# Patient Record
Sex: FEMALE | Race: WHITE | Employment: OTHER | URBAN - METROPOLITAN AREA
[De-identification: names, ages, dates, MRNs, and addresses within clinical notes are randomized per-mention and may not be internally consistent; named-entity substitution may affect disease eponyms.]

---

## 2017-01-12 ENCOUNTER — OFFICE VISIT - HEALTHEAST (OUTPATIENT)
Dept: FAMILY MEDICINE | Facility: CLINIC | Age: 82
End: 2017-01-12

## 2017-01-12 DIAGNOSIS — G31.84 MILD COGNITIVE IMPAIRMENT: ICD-10-CM

## 2017-12-20 ENCOUNTER — RECORDS - HEALTHEAST (OUTPATIENT)
Dept: LAB | Facility: CLINIC | Age: 82
End: 2017-12-20

## 2017-12-20 LAB
CHOLEST SERPL-MCNC: 190 MG/DL
FASTING STATUS PATIENT QL REPORTED: ABNORMAL
HDLC SERPL-MCNC: 49 MG/DL
LDLC SERPL CALC-MCNC: 127 MG/DL
TRIGL SERPL-MCNC: 70 MG/DL

## 2018-06-20 ENCOUNTER — RECORDS - HEALTHEAST (OUTPATIENT)
Dept: LAB | Facility: CLINIC | Age: 83
End: 2018-06-20

## 2018-06-20 LAB
ALBUMIN SERPL-MCNC: 2.8 G/DL (ref 3.5–5)
ALP SERPL-CCNC: 66 U/L (ref 45–120)
ALT SERPL W P-5'-P-CCNC: 11 U/L (ref 0–45)
ANION GAP SERPL CALCULATED.3IONS-SCNC: 8 MMOL/L (ref 5–18)
AST SERPL W P-5'-P-CCNC: 17 U/L (ref 0–40)
BILIRUB SERPL-MCNC: 0.6 MG/DL (ref 0–1)
BUN SERPL-MCNC: 13 MG/DL (ref 8–28)
CALCIUM SERPL-MCNC: 9.4 MG/DL (ref 8.5–10.5)
CHLORIDE BLD-SCNC: 105 MMOL/L (ref 98–107)
CO2 SERPL-SCNC: 26 MMOL/L (ref 22–31)
CREAT SERPL-MCNC: 0.71 MG/DL (ref 0.6–1.1)
ERYTHROCYTE [DISTWIDTH] IN BLOOD BY AUTOMATED COUNT: 13.4 % (ref 11–14.5)
GFR SERPL CREATININE-BSD FRML MDRD: >60 ML/MIN/1.73M2
GLUCOSE BLD-MCNC: 83 MG/DL (ref 70–125)
HCT VFR BLD AUTO: 43.6 % (ref 35–47)
HGB BLD-MCNC: 14.3 G/DL (ref 12–16)
MCH RBC QN AUTO: 30.4 PG (ref 27–34)
MCHC RBC AUTO-ENTMCNC: 32.8 G/DL (ref 32–36)
MCV RBC AUTO: 93 FL (ref 80–100)
PLATELET # BLD AUTO: 197 THOU/UL (ref 140–440)
PMV BLD AUTO: 9 FL (ref 8.5–12.5)
POTASSIUM BLD-SCNC: 3.6 MMOL/L (ref 3.5–5)
PROT SERPL-MCNC: 5.9 G/DL (ref 6–8)
RBC # BLD AUTO: 4.7 MILL/UL (ref 3.8–5.4)
SODIUM SERPL-SCNC: 139 MMOL/L (ref 136–145)
WBC: 7.7 THOU/UL (ref 4–11)

## 2018-08-11 ENCOUNTER — HOSPITAL ENCOUNTER (INPATIENT)
Facility: CLINIC | Age: 83
LOS: 2 days | Discharge: HOME | DRG: 690 | End: 2018-08-13
Attending: EMERGENCY MEDICINE | Admitting: INTERNAL MEDICINE
Payer: MEDICARE

## 2018-08-11 DIAGNOSIS — R00.1 SINUS BRADYCARDIA: ICD-10-CM

## 2018-08-11 DIAGNOSIS — N39.0 URINARY TRACT INFECTION WITHOUT HEMATURIA, SITE UNSPECIFIED: ICD-10-CM

## 2018-08-11 DIAGNOSIS — R53.1 WEAKNESS GENERALIZED: ICD-10-CM

## 2018-08-11 LAB
ALBUMIN SERPL-MCNC: 3 G/DL (ref 3.4–5)
ALBUMIN UR-MCNC: NEGATIVE MG/DL
ALP SERPL-CCNC: 88 U/L (ref 40–150)
ALT SERPL W P-5'-P-CCNC: 40 U/L (ref 0–50)
ANION GAP SERPL CALCULATED.3IONS-SCNC: 5 MMOL/L (ref 3–14)
APPEARANCE UR: ABNORMAL
AST SERPL W P-5'-P-CCNC: 80 U/L (ref 0–45)
BACTERIA #/AREA URNS HPF: ABNORMAL /HPF
BASOPHILS # BLD AUTO: 0.1 10E9/L (ref 0–0.2)
BASOPHILS NFR BLD AUTO: 0.6 %
BILIRUB DIRECT SERPL-MCNC: 0.1 MG/DL (ref 0–0.2)
BILIRUB SERPL-MCNC: 0.8 MG/DL (ref 0.2–1.3)
BILIRUB UR QL STRIP: NEGATIVE
BUN SERPL-MCNC: 18 MG/DL (ref 7–30)
CALCIUM SERPL-MCNC: 9.2 MG/DL (ref 8.5–10.1)
CHLORIDE SERPL-SCNC: 102 MMOL/L (ref 94–109)
CO2 BLDCOV-SCNC: 31 MMOL/L (ref 21–28)
CO2 SERPL-SCNC: 30 MMOL/L (ref 20–32)
COLOR UR AUTO: YELLOW
CREAT SERPL-MCNC: 0.73 MG/DL (ref 0.52–1.04)
DIFFERENTIAL METHOD BLD: ABNORMAL
EOSINOPHIL # BLD AUTO: 0.2 10E9/L (ref 0–0.7)
EOSINOPHIL NFR BLD AUTO: 2.8 %
ERYTHROCYTE [DISTWIDTH] IN BLOOD BY AUTOMATED COUNT: 13.5 % (ref 10–15)
GFR SERPL CREATININE-BSD FRML MDRD: 76 ML/MIN/1.7M2
GLUCOSE SERPL-MCNC: 100 MG/DL (ref 70–99)
GLUCOSE UR STRIP-MCNC: NEGATIVE MG/DL
HCT VFR BLD AUTO: 47.8 % (ref 35–47)
HGB BLD-MCNC: 15 G/DL (ref 11.7–15.7)
HGB UR QL STRIP: NEGATIVE
IMM GRANULOCYTES # BLD: 0.1 10E9/L (ref 0–0.4)
IMM GRANULOCYTES NFR BLD: 0.6 %
KETONES UR STRIP-MCNC: NEGATIVE MG/DL
LACTATE BLD-SCNC: 0.9 MMOL/L (ref 0.7–2.1)
LEUKOCYTE ESTERASE UR QL STRIP: ABNORMAL
LYMPHOCYTES # BLD AUTO: 1 10E9/L (ref 0.8–5.3)
LYMPHOCYTES NFR BLD AUTO: 11.8 %
MAGNESIUM SERPL-MCNC: 1.7 MG/DL (ref 1.6–2.3)
MCH RBC QN AUTO: 30 PG (ref 26.5–33)
MCHC RBC AUTO-ENTMCNC: 31.4 G/DL (ref 31.5–36.5)
MCV RBC AUTO: 96 FL (ref 78–100)
MONOCYTES # BLD AUTO: 0.7 10E9/L (ref 0–1.3)
MONOCYTES NFR BLD AUTO: 8.5 %
NEUTROPHILS # BLD AUTO: 6.4 10E9/L (ref 1.6–8.3)
NEUTROPHILS NFR BLD AUTO: 75.7 %
NITRATE UR QL: POSITIVE
NRBC # BLD AUTO: 0 10*3/UL
NRBC BLD AUTO-RTO: 0 /100
PCO2 BLDV: 54 MM HG (ref 40–50)
PH BLDV: 7.36 PH (ref 7.32–7.43)
PH UR STRIP: 5 PH (ref 5–7)
PLATELET # BLD AUTO: 264 10E9/L (ref 150–450)
PO2 BLDV: 22 MM HG (ref 25–47)
POTASSIUM SERPL-SCNC: 4.5 MMOL/L (ref 3.4–5.3)
POTASSIUM SERPL-SCNC: 5.2 MMOL/L (ref 3.4–5.3)
PROT SERPL-MCNC: 7.2 G/DL (ref 6.8–8.8)
RBC # BLD AUTO: 5 10E12/L (ref 3.8–5.2)
RBC #/AREA URNS AUTO: 1 /HPF (ref 0–2)
SAO2 % BLDV FROM PO2: 35 %
SODIUM SERPL-SCNC: 137 MMOL/L (ref 133–144)
SOURCE: ABNORMAL
SP GR UR STRIP: 1.01 (ref 1–1.03)
TROPONIN I BLD-MCNC: 0 UG/L (ref 0–0.1)
UROBILINOGEN UR STRIP-MCNC: 0 MG/DL (ref 0–2)
WBC # BLD AUTO: 8.5 10E9/L (ref 4–11)
WBC #/AREA URNS AUTO: 84 /HPF (ref 0–5)

## 2018-08-11 PROCEDURE — 12000007 ZZH R&B INTERMEDIATE

## 2018-08-11 PROCEDURE — 80076 HEPATIC FUNCTION PANEL: CPT | Performed by: EMERGENCY MEDICINE

## 2018-08-11 PROCEDURE — 87186 SC STD MICRODIL/AGAR DIL: CPT | Performed by: EMERGENCY MEDICINE

## 2018-08-11 PROCEDURE — 83605 ASSAY OF LACTIC ACID: CPT

## 2018-08-11 PROCEDURE — 25000128 H RX IP 250 OP 636: Performed by: EMERGENCY MEDICINE

## 2018-08-11 PROCEDURE — 96375 TX/PRO/DX INJ NEW DRUG ADDON: CPT

## 2018-08-11 PROCEDURE — 87088 URINE BACTERIA CULTURE: CPT | Performed by: EMERGENCY MEDICINE

## 2018-08-11 PROCEDURE — 80048 BASIC METABOLIC PNL TOTAL CA: CPT | Performed by: EMERGENCY MEDICINE

## 2018-08-11 PROCEDURE — 99285 EMERGENCY DEPT VISIT HI MDM: CPT | Mod: 25

## 2018-08-11 PROCEDURE — 96365 THER/PROPH/DIAG IV INF INIT: CPT

## 2018-08-11 PROCEDURE — 99223 1ST HOSP IP/OBS HIGH 75: CPT | Mod: AI | Performed by: INTERNAL MEDICINE

## 2018-08-11 PROCEDURE — 85025 COMPLETE CBC W/AUTO DIFF WBC: CPT | Performed by: EMERGENCY MEDICINE

## 2018-08-11 PROCEDURE — 84484 ASSAY OF TROPONIN QUANT: CPT

## 2018-08-11 PROCEDURE — 81001 URINALYSIS AUTO W/SCOPE: CPT | Performed by: EMERGENCY MEDICINE

## 2018-08-11 PROCEDURE — 87086 URINE CULTURE/COLONY COUNT: CPT | Performed by: EMERGENCY MEDICINE

## 2018-08-11 PROCEDURE — 82803 BLOOD GASES ANY COMBINATION: CPT

## 2018-08-11 PROCEDURE — 25000132 ZZH RX MED GY IP 250 OP 250 PS 637: Mod: GY | Performed by: INTERNAL MEDICINE

## 2018-08-11 PROCEDURE — 25000125 ZZHC RX 250: Performed by: INTERNAL MEDICINE

## 2018-08-11 PROCEDURE — 96361 HYDRATE IV INFUSION ADD-ON: CPT

## 2018-08-11 PROCEDURE — 93005 ELECTROCARDIOGRAM TRACING: CPT

## 2018-08-11 PROCEDURE — 83735 ASSAY OF MAGNESIUM: CPT | Performed by: EMERGENCY MEDICINE

## 2018-08-11 PROCEDURE — 84132 ASSAY OF SERUM POTASSIUM: CPT | Performed by: EMERGENCY MEDICINE

## 2018-08-11 PROCEDURE — 36415 COLL VENOUS BLD VENIPUNCTURE: CPT | Performed by: EMERGENCY MEDICINE

## 2018-08-11 PROCEDURE — 25000128 H RX IP 250 OP 636: Performed by: INTERNAL MEDICINE

## 2018-08-11 RX ORDER — DORZOLAMIDE HYDROCHLORIDE AND TIMOLOL MALEATE 20; 5 MG/ML; MG/ML
1 SOLUTION/ DROPS OPHTHALMIC EVERY MORNING
Status: DISCONTINUED | OUTPATIENT
Start: 2018-08-12 | End: 2018-08-13 | Stop reason: HOSPADM

## 2018-08-11 RX ORDER — NALOXONE HYDROCHLORIDE 0.4 MG/ML
.1-.4 INJECTION, SOLUTION INTRAMUSCULAR; INTRAVENOUS; SUBCUTANEOUS
Status: DISCONTINUED | OUTPATIENT
Start: 2018-08-11 | End: 2018-08-13 | Stop reason: HOSPADM

## 2018-08-11 RX ORDER — ONDANSETRON 2 MG/ML
2 INJECTION INTRAMUSCULAR; INTRAVENOUS ONCE
Status: COMPLETED | OUTPATIENT
Start: 2018-08-11 | End: 2018-08-11

## 2018-08-11 RX ORDER — ONDANSETRON 2 MG/ML
4 INJECTION INTRAMUSCULAR; INTRAVENOUS EVERY 6 HOURS PRN
Status: DISCONTINUED | OUTPATIENT
Start: 2018-08-11 | End: 2018-08-13 | Stop reason: HOSPADM

## 2018-08-11 RX ORDER — POTASSIUM CHLORIDE 750 MG/1
10 TABLET, EXTENDED RELEASE ORAL DAILY
Status: DISCONTINUED | OUTPATIENT
Start: 2018-08-12 | End: 2018-08-13 | Stop reason: HOSPADM

## 2018-08-11 RX ORDER — CEFTRIAXONE 1 G/1
1 INJECTION, POWDER, FOR SOLUTION INTRAMUSCULAR; INTRAVENOUS EVERY 24 HOURS
Status: DISCONTINUED | OUTPATIENT
Start: 2018-08-12 | End: 2018-08-13 | Stop reason: HOSPADM

## 2018-08-11 RX ORDER — BRIMONIDINE TARTRATE 2 MG/ML
1 SOLUTION/ DROPS OPHTHALMIC 3 TIMES DAILY
Status: DISCONTINUED | OUTPATIENT
Start: 2018-08-11 | End: 2018-08-13 | Stop reason: HOSPADM

## 2018-08-11 RX ORDER — ACETAMINOPHEN 500 MG
1000 TABLET ORAL 3 TIMES DAILY PRN
Status: ON HOLD | COMMUNITY
End: 2021-02-14

## 2018-08-11 RX ORDER — ONDANSETRON 4 MG/1
4 TABLET, ORALLY DISINTEGRATING ORAL EVERY 6 HOURS PRN
Status: DISCONTINUED | OUTPATIENT
Start: 2018-08-11 | End: 2018-08-13 | Stop reason: HOSPADM

## 2018-08-11 RX ORDER — POTASSIUM CHLORIDE 1500 MG/1
20-40 TABLET, EXTENDED RELEASE ORAL
Status: DISCONTINUED | OUTPATIENT
Start: 2018-08-11 | End: 2018-08-13 | Stop reason: HOSPADM

## 2018-08-11 RX ORDER — TRIAMTERENE/HYDROCHLOROTHIAZID 37.5-25 MG
1 TABLET ORAL DAILY
Status: DISCONTINUED | OUTPATIENT
Start: 2018-08-12 | End: 2018-08-13 | Stop reason: HOSPADM

## 2018-08-11 RX ORDER — TRIAMTERENE/HYDROCHLOROTHIAZID 37.5-25 MG
1 TABLET ORAL DAILY
COMMUNITY
End: 2018-08-11

## 2018-08-11 RX ORDER — POTASSIUM CHLORIDE 29.8 MG/ML
20 INJECTION INTRAVENOUS
Status: DISCONTINUED | OUTPATIENT
Start: 2018-08-11 | End: 2018-08-13 | Stop reason: HOSPADM

## 2018-08-11 RX ORDER — MULTIPLE VITAMINS W/ MINERALS TAB 9MG-400MCG
1 TAB ORAL DAILY
Status: DISCONTINUED | OUTPATIENT
Start: 2018-08-12 | End: 2018-08-13 | Stop reason: HOSPADM

## 2018-08-11 RX ORDER — ACETAMINOPHEN 500 MG
1000 TABLET ORAL 3 TIMES DAILY PRN
Status: DISCONTINUED | OUTPATIENT
Start: 2018-08-11 | End: 2018-08-13 | Stop reason: HOSPADM

## 2018-08-11 RX ORDER — POTASSIUM CHLORIDE 7.45 MG/ML
10 INJECTION INTRAVENOUS
Status: DISCONTINUED | OUTPATIENT
Start: 2018-08-11 | End: 2018-08-13 | Stop reason: HOSPADM

## 2018-08-11 RX ORDER — SODIUM CHLORIDE 9 MG/ML
INJECTION, SOLUTION INTRAVENOUS CONTINUOUS
Status: DISCONTINUED | OUTPATIENT
Start: 2018-08-11 | End: 2018-08-12

## 2018-08-11 RX ORDER — HYPOCHLOROUS ACID/SODIUM CHLOR 0.01 %
SPRAY, NON-AEROSOL (ML) TOPICAL DAILY
Status: DISCONTINUED | OUTPATIENT
Start: 2018-08-12 | End: 2018-08-13 | Stop reason: HOSPADM

## 2018-08-11 RX ORDER — POTASSIUM CHLORIDE 1.5 G/1.58G
20-40 POWDER, FOR SOLUTION ORAL
Status: DISCONTINUED | OUTPATIENT
Start: 2018-08-11 | End: 2018-08-13 | Stop reason: HOSPADM

## 2018-08-11 RX ORDER — CALCIUM CARBONATE 750 MG/1
1500 TABLET, CHEWABLE ORAL DAILY PRN
COMMUNITY

## 2018-08-11 RX ORDER — LATANOPROST 50 UG/ML
1 SOLUTION/ DROPS OPHTHALMIC AT BEDTIME
Status: DISCONTINUED | OUTPATIENT
Start: 2018-08-11 | End: 2018-08-13 | Stop reason: HOSPADM

## 2018-08-11 RX ORDER — POTASSIUM CL/LIDO/0.9 % NACL 10MEQ/0.1L
10 INTRAVENOUS SOLUTION, PIGGYBACK (ML) INTRAVENOUS
Status: DISCONTINUED | OUTPATIENT
Start: 2018-08-11 | End: 2018-08-13 | Stop reason: HOSPADM

## 2018-08-11 RX ORDER — POTASSIUM CHLORIDE 750 MG/1
10 TABLET, EXTENDED RELEASE ORAL DAILY
Status: ON HOLD | COMMUNITY
End: 2021-02-14

## 2018-08-11 RX ORDER — CYCLOSPORINE 0.5 MG/ML
1 EMULSION OPHTHALMIC 2 TIMES DAILY
Status: ON HOLD | COMMUNITY
End: 2021-02-14

## 2018-08-11 RX ORDER — CEFTRIAXONE 1 G/1
1 INJECTION, POWDER, FOR SOLUTION INTRAMUSCULAR; INTRAVENOUS ONCE
Status: COMPLETED | OUTPATIENT
Start: 2018-08-11 | End: 2018-08-11

## 2018-08-11 RX ORDER — CHLORAL HYDRATE 500 MG
2 CAPSULE ORAL DAILY
Status: ON HOLD | COMMUNITY
End: 2021-02-14

## 2018-08-11 RX ORDER — BRIMONIDINE TARTRATE 1.5 MG/ML
1 SOLUTION/ DROPS OPHTHALMIC 3 TIMES DAILY
Status: ON HOLD | COMMUNITY
End: 2021-02-14

## 2018-08-11 RX ADMIN — CEFTRIAXONE SODIUM 1 G: 1 INJECTION, POWDER, FOR SOLUTION INTRAMUSCULAR; INTRAVENOUS at 15:38

## 2018-08-11 RX ADMIN — DEXTRAN 70 AND HYPROMELLOSE 2910 1 DROP: 1; 3 SOLUTION/ DROPS OPHTHALMIC at 21:11

## 2018-08-11 RX ADMIN — SODIUM CHLORIDE: 9 INJECTION, SOLUTION INTRAVENOUS at 17:42

## 2018-08-11 RX ADMIN — LATANOPROST 1 DROP: 50 SOLUTION/ DROPS OPHTHALMIC at 21:16

## 2018-08-11 RX ADMIN — ONDANSETRON 4 MG: 2 INJECTION, SOLUTION INTRAMUSCULAR; INTRAVENOUS at 20:13

## 2018-08-11 RX ADMIN — BRIMONIDINE TARTRATE 1 DROP: 2 SOLUTION/ DROPS OPHTHALMIC at 21:07

## 2018-08-11 RX ADMIN — SODIUM CHLORIDE 500 ML: 9 INJECTION, SOLUTION INTRAVENOUS at 14:47

## 2018-08-11 RX ADMIN — ONDANSETRON 2 MG: 2 INJECTION INTRAMUSCULAR; INTRAVENOUS at 15:39

## 2018-08-11 ASSESSMENT — ENCOUNTER SYMPTOMS
NAUSEA: 1
DYSURIA: 0
DIARRHEA: 0
ACTIVITY CHANGE: 1
FATIGUE: 1
WEAKNESS: 1
APPETITE CHANGE: 1
LIGHT-HEADEDNESS: 1
FREQUENCY: 0
VOMITING: 1

## 2018-08-11 ASSESSMENT — ACTIVITIES OF DAILY LIVING (ADL)
RETIRED_COMMUNICATION: 0-->UNDERSTANDS/COMMUNICATES WITHOUT DIFFICULTY
RETIRED_EATING: 0-->INDEPENDENT
BATHING: 1-->ASSISTIVE EQUIPMENT
COGNITION: 1 - ATTENTION OR MEMORY DEFICITS
SWALLOWING: 0-->SWALLOWS FOODS/LIQUIDS WITHOUT DIFFICULTY
FALL_HISTORY_WITHIN_LAST_SIX_MONTHS: YES
TRANSFERRING: 3-->ASSISTIVE EQUIPMENT AND PERSON
AMBULATION: 3-->ASSISTIVE EQUIPMENT AND PERSON
ADLS_ACUITY_SCORE: 20
NUMBER_OF_TIMES_PATIENT_HAS_FALLEN_WITHIN_LAST_SIX_MONTHS: 2
WHICH_OF_THE_ABOVE_FUNCTIONAL_RISKS_HAD_A_RECENT_ONSET_OR_CHANGE?: AMBULATION
DRESS: 0-->INDEPENDENT
TOILETING: 2-->ASSISTIVE PERSON

## 2018-08-11 NOTE — IP AVS SNAPSHOT
` `     [place] [address] MEDICAL SURGICAL: [phone]            Medication Administration Report for Topitzhofer, Jean M as of 08/13/18 1015   Legend:    Given Hold Not Given Due Canceled Entry Other Actions    Time Time (Time) Time  Time-Action       Inactive    Active    Linked        Medications 08/07/18 08/08/18 08/09/18 08/10/18 08/11/18 08/12/18 08/13/18    acetaminophen (TYLENOL) tablet 1,000 mg  Dose: 1,000 mg  Freq: 3 TIMES DAILY PRN Route: PO  PRN Reason: mild pain  Start: 08/11/18 1711   Admin Instructions: Maximum acetaminophen dose from all sources = 75 mg/kg/day not to exceed 4 gram    Admin. Amount: 2 tablet (2 × 500 mg tablet)  Dispense Loc:  [facility]               AVENOVA 0.01 % SOLN  Freq: DAILY Route: EX  Start: 08/12/18 0900   Admin Instructions: PATIENT MAY USE OWN SUPPLY AFTER PHARMACY VERIFICATION    Last Admin: 08/13/18 1010  Dispense Loc:  Main Pharmacy          (0924)-Not Given        1010 ( )-Given           brimonidine (ALPHAGAN) 0.2 % ophthalmic solution 1 drop  Dose: 1 drop  Freq: 3 TIMES DAILY Route: RIGHT EYE  Start: 08/11/18 1715   Admin Instructions: Formulary alternate for BRIMONIDINE 0.15%<br>    Admin. Amount: 1 drop  Last Admin: 08/13/18 1009  Dispense Loc:  Main Pharmacy  Volume: 5 mL         (1843)-Not Given       2107 (1 drop)-Given        0914 (1 drop)-Given       1527 (1 drop)-Given       2127 (1 drop)-Given        1009 (1 drop)-Given       [ ] 1600       [ ] 2200           cefTRIAXone (ROCEPHIN) 1 g vial to attach to  mL bag for ADULTS or NS 50 mL bag for PEDS  Dose: 1 g  Freq: EVERY 24 HOURS Route: IV  Indications of Use: URINARY TRACT INFECTION  Start: 08/12/18 1530   Admin Instructions: FIRST DOSE IN ER    Admin. Amount: 1 g  Last Admin: 08/12/18 1423  Dispense Loc: [facility]  Infused Over: 15-30 Minutes  Volume: 10 mL          1423 (1 g)-New Bag               [ ] 1530           CERAVE CREA  Freq: DAILY Route: EX  Start: 08/12/18 0900   Admin  Instructions: PATIENT MAY USE OWN SUPPLY AFTER PHARMACY VERIFICATION    Dispense Loc:  Main Pharmacy          (0923)-Not Given        (0923)-Not Given           dorzolamide-timolol (COSOPT) ophthalmic solution 1 drop  Dose: 1 drop  Freq: EVERY MORNING Route: RIGHT EYE  Start: 08/12/18 0900   Admin. Amount: 1 drop  Last Admin: 08/13/18 0928  Dispense Loc:  Main Pharmacy  Volume: 10 mL          0914 (1 drop)-Given        0928 (1 drop)-Given           hypromellose-dextran (ARTIFICAL TEARS) 0.1-0.3 % ophthalmic solution 1 drop  Dose: 1 drop  Freq: 2 TIMES DAILY Route: Both Eyes  Start: 08/11/18 2100   Admin Instructions: Formulary alternate for<br>RESTASIS    Admin. Amount: 1 drop  Last Admin: 08/13/18 0921  Dispense Loc:  Main Pharmacy  Volume: 15 mL         2111 (1 drop)-Given        0914 (1 drop)-Given       2134 (1 drop)-Given        0921 (1 drop)-Given       [ ] 2100           latanoprost (XALATAN) 0.005 % ophthalmic solution 1 drop  Dose: 1 drop  Freq: AT BEDTIME Route: Both Eyes  Start: 08/11/18 2200   Admin Instructions: Refrigerated product.    Admin. Amount: 1 drop  Last Admin: 08/12/18 2120  Dispense Loc:  Main Pharmacy  Volume: 2.5 mL         2116 (1 drop)-Given        2120 (1 drop)-Given        [ ] 2200           melatonin tablet 1 mg  Dose: 1 mg  Freq: AT BEDTIME PRN Route: PO  PRN Reason: sleep  Start: 08/11/18 1711   Admin Instructions: Do not give unless at least 6 hours of uninterrupted sleep is expected.    Admin. Amount: 1 tablet (1 × 1 mg tablet)  Dispense Loc: [facility]               multivitamin, therapeutic with minerals (THERA-VIT-M) tablet 1 tablet  Dose: 1 tablet  Freq: DAILY Route: PO  Start: 08/12/18 0900   Admin Instructions: Formulary alternate for<br>CENTRUM    Admin. Amount: 1 tablet  Last Admin: 08/13/18 0926  Dispense Loc: [facility]          0914 (1 tablet)-Given        0926 (1 tablet)-Given           naloxone (NARCAN) injection 0.1-0.4 mg  Dose: 0.1-0.4 mg  Freq: EVERY 2  MIN PRN Route: IV  PRN Reason: opioid reversal  Start: 08/11/18 1711   Admin Instructions: For respiratory rate LESS than or EQUAL to 8.  Partial reversal dose:  0.1 mg titrated q 2 minutes for Analgesia Side Effects Monitoring Sedation Level of 3 (frequently drowsy, arousable, drifts to sleep during conversation).Full reversal dose:  0.4 mg bolus for Analgesia Side Effects Monitoring Sedation Level of 4 (somnolent, minimal or no response to stimulation).  For ordered doses up to 2mg give IVP. Give each 0.4mg over 15 seconds in emergency situations. For non-emergent situations further dilute in 9mL of NS to facilitate titration of response.    Admin. Amount: 0.1-0.4 mg = 0.25-1 mL Conc: 0.4 mg/mL  Dispense Loc:  [facility]  Volume: 1 mL               ondansetron (ZOFRAN-ODT) ODT tab 4 mg  Dose: 4 mg  Freq: EVERY 6 HOURS PRN Route: PO  PRN Reasons: nausea,vomiting  Start: 08/11/18 1711   Admin Instructions: This is Step 1 of nausea and vomiting management.  If nausea not resolved in 15 minutes, go to Step 2 prochlorperazine (COMPAZINE). Do not push through foil backing. Peel back foil and gently remove. Place on tongue immediately. Administration with liquid unnecessary  With dry hands, peel back foil backing and gently remove tablet; do not push oral disintegrating tablet through foil backing; administer immediately on tongue and oral disintegrating tablet dissolves in seconds; then swallow with saliva; liquid not required.    Admin. Amount: 1 tablet (1 × 4 mg tablet)  Dispense Loc: [facility]                     Or  ondansetron (ZOFRAN) injection 4 mg  Dose: 4 mg  Freq: EVERY 6 HOURS PRN Route: IV  PRN Reasons: nausea,vomiting  Start: 08/11/18 1711   Admin Instructions: This is Step 1 of nausea and vomiting management.  If nausea not resolved in 15 minutes, go to Step 2 prochlorperazine (COMPAZINE).  Irritant. For ordered doses up to 4 mg, give IV Push undiluted over 2-5 minutes.    Admin. Amount: 4 mg = 2 mL  Conc: 4 mg/2 mL  Last Admin: 08/11/18 2013  Dispense Loc:  [facility]  Infused Over: 2-5 Minutes  Volume: 2 mL         2013 (4 mg)-Given             potassium chloride (K-TAB,KLOR-CON) CR tablet 10 mEq  Dose: 10 mEq  Freq: DAILY Route: PO  Start: 08/12/18 0900   Admin Instructions: DO NOT CRUSH.    Admin. Amount: 1 tablet (1 × 10 mEq tablet)  Last Admin: 08/13/18 0926  Dispense Loc:  [facility]          0914 (10 mEq)-Given        0926 (10 mEq)-Given           potassium chloride (KLOR-CON) Packet 20-40 mEq  Dose: 20-40 mEq  Freq: EVERY 2 HOURS PRN Route: ORAL OR FEED  PRN Reason: potassium supplementation  Start: 08/11/18 1711   Admin Instructions: Use if unable to tolerate tablets.  If Serum K+ 3.0-3.3, dose = 60 mEq po total dose (40 mEq x1 followed in 2 hours by 20 mEq x1). Recheck K+ level 4 hours after dose and the next AM.  If Serum K+ 2.5-2.9, dose = 80 mEq po total dose (40 mEq Q2H x2). Recheck K+ level 4 hours after dose and the next AM.  If Serum K+ less than 2.5, See IV order.  Dissolve packet contents in 4-8 ounces of cold water or juice.    Admin. Amount: 20-40 mEq  Dispense Loc:  [facility]               potassium chloride 10 mEq in 100 mL intermittent infusion with 10 mg lidocaine  Dose: 10 mEq  Freq: EVERY 1 HOUR PRN Route: IV  PRN Reason: potassium supplementation  Start: 08/11/18 1711   Admin Instructions: Infuse via PERIPHERAL LINE. Use potassium with lidocaine for pain with peripheral administration.  If Serum K+ 3.0-3.3, dose = 10 mEq/hr x4 doses (40 mEq IV total dose). Recheck K+ level 2 hours after dose and the next AM.  If Serum K+ less than 3.0, dose = 10 mEq/hr x6 doses (60 mEq IV total dose). Recheck K+ level 2 hours after dose and the next AM.    Admin. Amount: 10 mEq = 100 mL Conc: 10 mEq/100 mL  Dispense Loc:  Main Pharmacy  Infused Over: 1 Hours  Volume: 100 mL               potassium chloride 10 mEq in 100 mL sterile water intermittent infusion (premix)  Dose: 10 mEq  Freq: EVERY 1  HOUR PRN Route: IV  PRN Reason: potassium supplementation  Start: 08/11/18 1711   Admin Instructions: Infuse via PERIPHERAL LINE or CENTRAL LINE. Use for central line replacement if patient weight less than 65 kg, if patient is on TPN with high potassium content or if unit does not stock 20 mEq bags.   If Serum K+ 3.0-3.3, dose = 10 mEq/hr x4 doses (40 mEq IV total dose). Recheck K+ level 2 hours after dose and the next AM.   If Serum K+ less than 3.0, dose = 10 mEq/hr x6 doses (60 mEq IV total dose). Recheck K+ level 2 hours after dose and the next AM.    Admin. Amount: 10 mEq = 100 mL Conc: 10 mEq/100 mL  Dispense Loc:  Main Pharmacy  Infused Over: 60 Minutes  Volume: 100 mL               potassium chloride 20 mEq in 50 mL intermittent infusion  Dose: 20 mEq  Freq: EVERY 1 HOUR PRN Route: IV  PRN Reason: potassium supplementation  Start: 08/11/18 1711   Admin Instructions: GIVE OVER 2 HOURS DUE TO LOW PATIENT WEIGHT<br>Infuse via CENTRAL LINE Only. May need EKG if less than 65 kg or on TPN - Max rate is 0.3 mEq/kg/hr for patients not on EKG monitoring. <br>If Serum K+ 3.0-3.3, dose = 20 mEq/hr x2 doses (40 mEq IV total dose). Recheck K+ level 2 hours after dose and the next AM.<br>If Serum K+ less than 3.0, dose = 20 mEq/hr x3 doses (60 mEq IV total dose). Recheck K+ level 2 hours after dose and the next AM.    Admin. Amount: 20 mEq = 50 mL Conc: 20 mEq/50 mL  Dispense Loc:  Main Pharmacy  Infused Over: 2 Hours  Volume: 50 mL               potassium chloride SA (K-DUR/KLOR-CON M) CR tablet 20-40 mEq  Dose: 20-40 mEq  Freq: EVERY 2 HOURS PRN Route: PO  PRN Reason: potassium supplementation  Start: 08/11/18 1711   Admin Instructions: Use if able to take PO.   If Serum K+ 3.0-3.3, dose = 60 mEq po total dose (40 mEq x1 followed in 2 hours by 20 mEq x1). Recheck K+ level 4 hours after dose and the next AM.  If Serum K+ 2.5-2.9, dose = 80 mEq po total dose (40 mEq Q2H x2). Recheck K+ level 4 hours after dose and  the next AM.  If Serum K+ less than 2.5, See IV order.  DO NOT CRUSH    Admin. Amount: 1-2 tablet (1-2 × 20 mEq tablet)  Dispense Loc: [facility] MS3[facility]               triamterene-hydrochlorothiazide (MAXZIDE-25) 37.5-25 MG per tablet 1 tablet  Dose: 1 tablet  Freq: DAILY Route: PO  Start: 18 0900   Admin. Amount: 1 tablet  Last Admin: 18 09  Dispense Loc: [facility]          0914 (1 tablet)-Given        926 (1 tablet)-Given          Completed Medications  Medications 08/07/18 08/08/18 08/09/18 08/10/18 08/11/18 08/12/18 08/13/18         Dose: 500 mL  Freq: ONCE Route: IV  Last Dose: 500 mL (18 144)  Start: 18 141   End: 18 1547   Admin. Amount: 500 mL  Last Admin: 18 144  Dispense Loc: [facility] ER  Infused Over: 1 Hours  Administrations Remainin  Volume: 500 mL         1447 (500 mL)-New Bag               Dose: 1 g  Freq: ONCE Route: IV  Indications of Use: URINARY TRACT INFECTION  Start: 18 1521   End: 18 1608   Admin. Amount: 1 g  Last Admin: 18 1538  Dispense Loc: [facility] ADS ERA  Infused Over: 15-30 Minutes  Administrations Remainin  Volume: 10 mL         1538 (1 g)-New Bag               Dose: 2 mg  Freq: ONCE Route: IV  Start: 18 1518   End: 18 1541   Admin Instructions: Irritant. For ordered doses up to 4 mg, give IV Push undiluted over 2-5 minutes.    Admin. Amount: 2 mg = 1 mL Conc: 4 mg/2 mL  Last Admin: 18 153  Dispense Loc: [facility]S ERA  Infused Over: 2-5 Minutes  Administrations Remainin  Volume: 2 mL         1539 (2 mg)-Given            Discontinued Medications  Medications 08/07/18 08/08/18 08/09/18 08/10/18 08/11/18 08/12/18 08/13/18         Rate: 100 mL/hr   Freq: CONTINUOUS Route: IV  Last Dose: Stopped (18 0808)  Start: 18   End: 18   Last Admin: 18  Dispense Loc: [facility] Floor Stock  Volume: 1,000 mL          ( )-New Bag        ( )-Rate/Dose Verify        313 ( )-New Bag        0808-Stopped       1729-Med Discontinued

## 2018-08-11 NOTE — ED TRIAGE NOTES
Pt has not felt well for the past three days but today pt could not get up out of bed and stand due to weakness, vomiting and nausea. Pt is a fall risk and lives at an assisted living facilty. Pt does have dementia that is progressing rapidly per niece and guardian.

## 2018-08-11 NOTE — IP AVS SNAPSHOT
"    [place] [address] MEDICAL SURGICAL: [phone]                                              INTERAGENCY TRANSFER FORM - LAB / IMAGING / EKG / EMG RESULTS   2018                    Hospital Admission Date: 2018  JEAN M TOPITZHOFER   : 1934  Sex: Female        Attending Provider: (none)    Allergies:  No Known Allergies    Infection:  None   Service:  GENERAL MEDI    Ht:  1.575 m (5' 2\")   Wt:  49 kg (108 lb)   Admission Wt:  49 kg (108 lb)    BMI:  19.75 kg/m 2   BSA:  1.46 m 2            Patient PCP Information     Provider PCP Type    [facility]         Lab Results - 3 Days      Urine Culture Aerobic Bacterial [[id number]] (Abnormal)  Resulted: 18 0740, Result status: Final result    Ordering provider: Paolo Elizondo MD  18 1417 Resulting lab: [facility]    Specimen Information    Type Source Collected On   Catheterized Urine  18 1444          Components       Value Reference Range Flag Lab   Specimen Description Catheterized Urine      Special Requests Specimen received in preservative   75   Culture Micro --  A 225   Result:         >100,000 colonies/mL  Escherichia coli              Basic metabolic panel [[id number]] (Abnormal)  Resulted: 18 0750, Result status: Final result    Ordering provider: Joselo Llanes MD  18 0702 Resulting lab: [facility]    Specimen Information    Type Source Collected On     18 0717          Components       Value Reference Range Flag Lab   Sodium 141 133 - 144 mmol/L  FrRdHs   Potassium 3.7 3.4 - 5.3 mmol/L  FrRdHs   Chloride 107 94 - 109 mmol/L  FrRdHs   Carbon Dioxide 26 20 - 32 mmol/L  FrRdHs   Anion Gap 8 3 - 14 mmol/L  FrRdHs   Glucose 79 70 - 99 mg/dL  FrRdHs   Urea Nitrogen 16 7 - 30 mg/dL  FrRdHs   Creatinine 0.76 0.52 - 1.04 mg/dL  FrRdHs   GFR Estimate 72 >60 mL/min/1.7m2  FrRdHs   Comment:  Non  GFR Calc   GFR Estimate If " Black 87 >60 mL/min/1.7m2  FrRdHs   Comment:  African American GFR Calc   Calcium 8.2 8.5 - 10.1 mg/dL L [facility]            Basic metabolic panel [[id number]]  Resulted: 08/12/18 0701, Result status: Final result    Ordering provider: Joselo Llanes MD  08/12/18 0000 Resulting lab: [facility]    Specimen Information    Type Source Collected On   Blood  08/12/18 0623          Components       Value Reference Range Flag Lab   Sodium Canceled, Test credited 133 - 144 mmol/L  FrRdHs   Comment:         Unsatisfactory specimen - hemolyzed  Called to  [facility] (MS3) ON 081218 AT 0700 BY KV     Potassium Canceled, Test credited 3.4 - 5.3 mmol/L  FrRdHs   Comment:         Unsatisfactory specimen - hemolyzed  Called to  [facility] (MS3) ON 081218 AT 0700 BY KV     Chloride Canceled, Test credited 94 - 109 mmol/L  FrRdHs   Comment:         Unsatisfactory specimen - hemolyzed  Called to  [facility] (MS3) ON 081218 AT 0700 BY KV     Carbon Dioxide Canceled, Test credited 20 - 32 mmol/L  FrRdHs   Comment:         Unsatisfactory specimen - hemolyzed  Called to  [facility] (MS3) ON 081218 AT 0700 BY KV     Anion Gap Not Calculated 3 - 14 mmol/L  FrRdHs   Comment:         Unsatisfactory specimen - hemolyzed  Called to  [facility] (MS3) ON 081218 AT 0700 BY KV     Glucose Canceled, Test credited 70 - 99 mg/dL  FrRdHs   Comment:         Unsatisfactory specimen - hemolyzed  Called to  [facility] (MS3) ON 081218 AT 0700 BY KV     Urea Nitrogen Canceled, Test credited 7 - 30 mg/dL  FrRdHs   Comment:         Unsatisfactory specimen - hemolyzed  Called to  [facility] (MS3) ON 081218 AT 0700 BY KV     Creatinine Canceled, Test credited 0.52 - 1.04 mg/dL  FrRdHs   Comment:         Unsatisfactory specimen - hemolyzed  Called to  [facility] (MS3) ON 081218 AT 0700 BY KV     GFR Estimate Not Calculated >60 mL/min/1.7m2  FrRdHs   Comment:          Unsatisfactory specimen - hemolyzed  Called to  [facility] (MS3) ON 081218 AT 0700 BY KV     GFR Estimate If Black Not Calculated >60 mL/min/1.7m2  FrRd   Comment:         Unsatisfactory specimen - hemolyzed  Called to  [facility] (MS3) ON 081218 AT 0700 BY KV     Calcium Canceled, Test credited 8.5 - 10.1 mg/dL  FrRd   Comment:         Unsatisfactory specimen - hemolyzed  Called to  [facility] (MS3) ON 081218 AT 0700 BY KV              Potassium [[id number]]  Resulted: 08/11/18 1626, Result status: Final result    Ordering provider: Paolo Elizondo MD  08/11/18 1557 Resulting lab: [facility]    Specimen Information    Type Source Collected On     08/11/18 1611          Components       Value Reference Range Flag Lab   Potassium 4.5 3.4 - 5.3 mmol/L  Rd            Magnesium [[id number]]  Resulted: 08/11/18 1626, Result status: Final result    Ordering provider: Paolo Elizondo MD  08/11/18 1557 Resulting lab: [facility]    Specimen Information    Type Source Collected On     08/11/18 1611          Components       Value Reference Range Flag Lab   Magnesium 1.7 1.6 - 2.3 mg/dL  FrRd            Potassium [[id number]]  Resulted: 08/11/18 1534, Result status: In process    Ordering provider: Paolo Elizondo MD  08/11/18 1517 Resulting lab: [facility]    Specimen Information    Type Source Collected On   Blood  08/11/18 1534            Magnesium [[id number]]  Resulted: 08/11/18 1534, Result status: In process    Ordering provider: Paolo Elizondo MD  08/11/18 1533 Resulting lab: [facility]    Specimen Information    Type Source Collected On   Blood  08/11/18 1534            UA with Microscopic [[id number]] (Abnormal)  Resulted: 08/11/18 1513, Result status: Final result    Ordering provider: Paolo Elizondo MD  08/11/18 1417 Resulting lab: [facility]    Specimen Information    Type Source Collected On   Catheterized Urine   08/11/18 1445          Components       Value Reference Range Flag Lab   Color Urine Yellow   FrRdHs   Appearance Urine Slightly Cloudy   FrRdHs   Glucose Urine Negative NEG^Negative mg/dL  FrRdHs   Bilirubin Urine Negative NEG^Negative  FrRdHs   Ketones Urine Negative NEG^Negative mg/dL  FrRdHs   Specific Gravity Urine 1.015 1.003 - 1.035  FrRdHs   Blood Urine Negative NEG^Negative  FrRdHs   pH Urine 5.0 5.0 - 7.0 pH  FrRdHs   Protein Albumin Urine Negative NEG^Negative mg/dL  FrRdHs   Urobilinogen mg/dL 0.0 0.0 - 2.0 mg/dL  FrRdHs   Nitrite Urine Positive NEG^Negative A FrRdHs   Leukocyte Esterase Urine Moderate NEG^Negative A FrRdHs   Source Catheterized Urine   FrRdHs   WBC Urine 84 0 - 5 /HPF H FrRdHs   RBC Urine 1 0 - 2 /HPF  FrRdHs   Bacteria Urine Many NEG^Negative /HPF A FrRdHs            Basic metabolic panel (BMP) [[id number]] (Abnormal)  Resulted: 08/11/18 1512, Result status: Final result    Ordering provider: Paolo Elizondo MD  08/11/18 1416 Resulting lab: [facility]    Specimen Information    Type Source Collected On   Blood  08/11/18 1428          Components       Value Reference Range Flag Lab   Sodium 137 133 - 144 mmol/L  FrRdHs   Potassium 5.2 3.4 - 5.3 mmol/L  FrRdHs   Comment:  Specimen slightly hemolyzed, potassium may be falsely elevated   Chloride 102 94 - 109 mmol/L  FrRdHs   Carbon Dioxide 30 20 - 32 mmol/L  FrRdHs   Anion Gap 5 3 - 14 mmol/L  FrRdHs   Glucose 100 70 - 99 mg/dL H FrRdHs   Urea Nitrogen 18 7 - 30 mg/dL  FrRdHs   Creatinine 0.73 0.52 - 1.04 mg/dL  FrRdHs   GFR Estimate 76 >60 mL/min/1.7m2  FrRd   Comment:  Non  GFR Calc   GFR Estimate If Black >90 >60 mL/min/1.7m2  Rd   Comment:  African American GFR Calc   Calcium 9.2 8.5 - 10.1 mg/dL  FrRd            Hepatic panel [[id number]] (Abnormal)  Resulted: 08/11/18 1512, Result status: Final result    Ordering provider: Paolo Elizondo MD  08/11/18 1416 Resulting lab: [facility]    Specimen Information    Type Source Collected On   Blood  08/11/18 1428          Components       Value Reference Range Flag Lab   Bilirubin Direct 0.1 0.0 - 0.2 mg/dL  FrRdHs   Bilirubin Total 0.8 0.2 - 1.3 mg/dL  FrRdHs   Albumin 3.0 3.4 - 5.0 g/dL L FrRdHs   Protein Total 7.2 6.8 - 8.8 g/dL  FrRdHs   Alkaline Phosphatase 88 40 - 150 U/L  FrRdHs   ALT 40 0 - 50 U/L  FrRdHs   AST 80 0 - 45 U/L H FrRdHs            Lactic acid whole blood [[id number]]  Resulted: 08/11/18 1510, Result status: In process    Ordering provider: Paolo Elizondo MD  08/11/18 1505 Resulting lab: [facility]    Specimen Information    Type Source Collected On     08/11/18 1505            CBC + differential [[id number]] (Abnormal)  Resulted: 08/11/18 1453, Result status: Final result    Ordering provider: Paolo Elizondo MD  08/11/18 1416 Resulting lab: [facility]    Specimen Information    Type Source Collected On   Blood  08/11/18 1428          Components       Value Reference Range Flag Lab   WBC 8.5 4.0 - 11.0 10e9/L  FrRdHs   RBC Count 5.00 3.8 - 5.2 10e12/L  FrRdHs   Hemoglobin 15.0 11.7 - 15.7 g/dL  FrRdHs   Hematocrit 47.8 35.0 - 47.0 % H FrRdHs   MCV 96 78 - 100 fl  FrRdHs   MCH 30.0 26.5 - 33.0 pg  FrRdHs   MCHC 31.4 31.5 - 36.5 g/dL L FrRdHs   RDW 13.5 10.0 - 15.0 %  FrRdHs   Platelet Count 264 150 - 450 10e9/L  FrRdHs   Diff Method Automated Method   FrRdHs   % Neutrophils 75.7 %  FrRdHs   % Lymphocytes 11.8 %  FrRdHs   % Monocytes 8.5 %  FrRdHs   % Eosinophils 2.8 %  FrRdHs   % Basophils 0.6 %  FrRdHs   % Immature Granulocytes 0.6 %  FrRdHs   Nucleated RBCs 0 0 /100  FrRdHs   Absolute Neutrophil 6.4 1.6 - 8.3 10e9/L  FrRdHs   Absolute Lymphocytes 1.0 0.8 - 5.3 10e9/L  FrRdHs   Absolute Monocytes 0.7 0.0 - 1.3 10e9/L  FrRdHs   Absolute Eosinophils 0.2 0.0 - 0.7 10e9/L  FrRdHs   Absolute Basophils 0.1 0.0 - 0.2 10e9/L  FrRdHs   Abs Immature Granulocytes 0.1 0 - 0.4 10e9/L  FrRdHs   Absolute Nucleated RBC  "0.0   [facility]            Testing Performed By     Lab - Abbreviation Name Director Address Valid Date Range    12 - FrRdHs [facility] Unknown [address]  [facility] [address] 05/08/15 1057 - Present    45 - PDO152 [facility] Unknown Unknown 01/28/02 0000 - Present    75 - Unknown [facility] Unknown [address] [facility] [address] 01/15/15 1019 - Present    225 - Unknown [facility] Unknown [address] [facility] [address] 12/19/14 0954 - Present            Unresulted Labs (24h ago through future)    Start       Ordered    Unscheduled  Potassium  (Potassium Replacement - \"Standard\" - For K levels less than 3.4 mmol/L - UU,UR,UA,RH,SH,PH,WY )  CONDITIONAL (SPECIFY),   Routine     Comments:  Obtain Potassium Level for these conditions:  *IF no potassium result within 24 hours before initiation of order set, draw potassium level with next lab collect.    *2 HOURS AFTER last IV potassium replacement dose and 4 hours after an oral replacement dose.  *Next morning after potassium dose.     Repeat Potassium Replacement if necessary.    08/11/18 2231      Encounter-Level Documents:     There are no encounter-level documents.      Order-Level Documents:     There are no order-level documents.      "

## 2018-08-11 NOTE — IP AVS SNAPSHOT
MRN:[id number]                      After Visit Summary   8/11/2018    Jean M Topitzhofer    MRN: [id number]           Thank you!     Thank you for choosing [facility] for your care. Our goal is always to provide you with excellent care. Hearing back from our patients is one way we can continue to improve our services. Please take a few minutes to complete the written survey that you may receive in the mail after you visit. If you would like to speak to someone directly about your visit please contact Patient Relations at [phone]. Thank you!          Patient Information     Date Of Birth          7/4/1934        Designated Caregiver       Most Recent Value    Caregiver    Will someone help with your care after discharge? no      About your hospital stay     You were admitted on:  August 11, 2018 You last received care in the:  [place] [address] [facility]    You were discharged on:  August 13, 2018        Reason for your hospital stay       UTI with weakness                  Who to Call     For medical emergencies, please call 911.  For non-urgent questions about your medical care, please call your primary care provider or clinic, None          Attending Provider     Provider Specialty    Paolo Elizondo MD Emergency Medicine    [facility] Medicine       Primary Care Provider Fax #    [facility] [phone]      After Care Instructions     Activity       Your activity upon discharge: activity as tolerated            Diet       Follow this diet upon discharge: Regular                  Follow-up Appointments     Follow-up and recommended labs and tests        With primary care provider within 1 week                  Pending Results     No orders found from 8/9/2018 to 8/12/2018.            Statement of Approval     Ordered          08/13/18 0949  I have reviewed and agree with all the recommendations and orders detailed in this  "document.  EFFECTIVE NOW     Approved and electronically signed by:  Isidro Mcfarlane MD             Admission Information     Date & Time Department Dept. Phone    2018 [place] [address] [facility] [phone]      Your Vitals Were     Blood Pressure Pulse Temperature Respirations Height Weight    165/67 (BP Location: Left arm) 44 98  F (36.7  C) (Oral) 16 1.575 m (5' 2\") 49 kg (108 lb)    Last Period Pulse Oximetry BMI (Body Mass Index)             (LMP Unknown) 94% 19.75 kg/m2         MyChart Information     [employer] lets you send messages to your doctor, view your test results, renew your prescriptions, schedule appointments and more. To sign up, go to [email or web address] . Click on \"Log in\" on the left side of the screen, which will take you to the Welcome page. Then click on \"Sign up Now\" on the right side of the page.     You will be asked to enter the access code listed below, as well as some personal information. Please follow the directions to create your username and password.     Your access code is: [email or web address]  Expires: 2018 10:15 AM     Your access code will  in 90 days. If you need help or a new code, please call your [facility] clinic or [phone].        Care EveryWhere ID     This is your Care EveryWhere ID. This could be used by other organizations to access your [facility] medical records  [id number]        Equal Access to Services     [facility]: Hadii nirmal whitman hadasho Sodedraali, waaxda luqadaha, qaybta kaalmada merariyaedgar, kuldeep encarnacion . So [facility] [phone].    ATENCIÓN: Si habla español, tiene a rogers disposición servicios gratuitos de asistencia lingüística. Llame al [phone].    We comply with applicable federal civil rights laws and [place] laws. We do not discriminate on the basis of race, color, national origin, age, disability, sex, sexual orientation, or gender identity.               Review of your medicines      START " taking        Dose / Directions    sulfamethoxazole-trimethoprim 800-160 MG per tablet   Commonly known as:  BACTRIM DS/SEPTRA DS   Used for:  Urinary tract infection without hematuria, site unspecified        Dose:  1 tablet   Take 1 tablet by mouth 2 times daily for 3 days   Quantity:  6 tablet   Refills:  0         CONTINUE these medicines which have NOT CHANGED        Dose / Directions    acetaminophen 500 MG tablet   Commonly known as:  TYLENOL        Dose:  1000 mg   Take 1,000 mg by mouth 3 times daily as needed for mild pain   Refills:  0       ANTACID EXTRA STRENGTH 750 MG Chew   Generic drug:  calcium carbonate        Dose:  1500 mg   Take 1,500 mg by mouth daily as needed   Refills:  0       AVENOVA EX        Dose:  1 spray   Externally apply 1 spray topically daily Spray to closed eyes covering eye lids   Refills:  0       brimonidine 0.15 % ophthalmic solution   Commonly known as:  ALPHAGAN-P        Dose:  1 drop   Place 1 drop into the right eye 3 times daily   Refills:  0       CENTRUM Tabs   Used for:  Routine general medical examination at a health care facility        Dose:  1 tablet   Take 1 tablet by mouth daily   Quantity:  30 tablet   Refills:  0       CERAVE Crea        Externally apply topically daily To boy legs   Refills:  0       dorzolamide-timolol 2-0.5 % ophthalmic solution   Commonly known as:  COSOPT        Dose:  1 drop   Place 1 drop into the right eye every morning   Refills:  0       fish oil-omega-3 fatty acids 1000 MG capsule        Dose:  2 g   Take 2 g by mouth daily   Refills:  0       guaiFENesin 100 MG/5ML Syrp   Commonly known as:  ROBITUSSIN        Dose:  10 mL   Take 10 mLs by mouth every 4 hours as needed for cough   Refills:  0       latanoprost 0.005 % ophthalmic solution   Commonly known as:  XALATAN        Dose:  1 drop   Place 1 drop into both eyes At Bedtime   Refills:  4       potassium chloride SA 10 MEQ CR tablet   Commonly known as:  K-DUR/KLOR-CON M         Dose:  10 mEq   Take 10 mEq by mouth daily   Refills:  0       RESTASIS 0.05 % ophthalmic emulsion   Generic drug:  cycloSPORINE        Dose:  1 drop   Place 1 drop into both eyes 2 times daily   Refills:  0       triamterene-hydrochlorothiazide 37.5-25 MG per capsule   Commonly known as:  DYAZIDE   Used for:  Hypertension, essential        Dose:  1 capsule   Take 1 capsule by mouth daily   Quantity:  30 capsule   Refills:  5            Where to get your medicines      These medications were sent to [facility] [address] [facility]  [address] [facility] [place] [address]     Phone:  [phone]     sulfamethoxazole-trimethoprim 800-160 MG per tablet                Protect others around you: Learn how to safely use, store and throw away your medicines at www.disposemymeds.org.        ANTIBIOTIC INSTRUCTION     You've Been Prescribed an Antibiotic - Now What?  Your healthcare team thinks that you or your loved one might have an infection. Some infections can be treated with antibiotics, which are powerful, life-saving drugs. Like all medications, antibiotics have side effects and should only be used when necessary. There are some important things you should know about your antibiotic treatment.      Your healthcare team may run tests before you start taking an antibiotic.    Your team may take samples (e.g., from your blood, urine or other areas) to run tests to look for bacteria. These test can be important to determine if you need an antibiotic at all and, if you do, which antibiotic will work best.      Within a few days, your healthcare team might change or even stop your antibiotic.    Your team may start you on an antibiotic while they are working to find out what is making you sick.    Your team might change your antibiotic because test results show that a different antibiotic would be better to treat your infection.    In some cases, once your team has more  information, they learn that you do not need an antibiotic at all. They may find out that you don't have an infection, or that the antibiotic you're taking won't work against your infection. For example, an infection caused by a virus can't be treated with antibiotics. Staying on an antibiotic when you don't need it is more likely to be harmful than helpful.      You may experience side effects from your antibiotic.    Like all medications, antibiotics have side effects. Some of these can be serious.    Let you healthcare team know if you have any known allergies when you are admitted to the hospital.    One significant side effect of nearly all antibiotics is the risk of severe and sometimes deadly diarrhea caused by Clostridium difficile (C. Difficile). This occurs when a person takes antibiotics because some good germs are destroyed. Antibiotic use allows C. diificile to take over, putting patients at high risk for this serious infection.    As a patient or caregiver, it is important to understand your or your loved one's antibiotic treatment. It is especially important for caregivers to speak up when patients can't speak for themselves. Here are some important questions to ask your healthcare team.    What infection is this antibiotic treating and how do you know I have that infection?    What side effects might occur from this antibiotic?    How long will I need to take this antibiotic?    Is it safe to take this antibiotic with other medications or supplements (e.g., vitamins) that I am taking?     Are there any special directions I need to know about taking this antibiotic? For example, should I take it with food?    How will I be monitored to know whether my infection is responding to the antibiotic?    What tests may help to make sure the right antibiotic is prescribed for me?      Information provided by:  www.cdc.gov/getsmart  U.S. Department of Health and Human Services  Centers for disease Control and  Prevention  National Center for Emerging and Zoonotic Infectious Diseases  Division of Healthcare Quality Promotion             Medication List: This is a list of all your medications and when to take them. Check marks below indicate your daily home schedule. Keep this list as a reference.      Medications           Morning Afternoon Evening Bedtime As Needed    acetaminophen 500 MG tablet   Commonly known as:  TYLENOL   Take 1,000 mg by mouth 3 times daily as needed for mild pain                                   ANTACID EXTRA STRENGTH 750 MG Chew   Take 1,500 mg by mouth daily as needed   Generic drug:  calcium carbonate                                   AVENOVA EX   Externally apply 1 spray topically daily Spray to closed eyes covering eye lids   Last time this was given:  8/13/2018 10:10 AM                                brimonidine 0.15 % ophthalmic solution   Commonly known as:  ALPHAGAN-P   Place 1 drop into the right eye 3 times daily                                         CENTRUM Tabs   Take 1 tablet by mouth daily   Last time this was given:  1 tablet on 8/13/2018  9:26 AM                                   CERAVE Crea   Externally apply topically daily To boy legs                                   dorzolamide-timolol 2-0.5 % ophthalmic solution   Commonly known as:  COSOPT   Place 1 drop into the right eye every morning   Last time this was given:  1 drop on 8/13/2018  9:28 AM                                   fish oil-omega-3 fatty acids 1000 MG capsule   Take 2 g by mouth daily                                   guaiFENesin 100 MG/5ML Syrp   Commonly known as:  ROBITUSSIN   Take 10 mLs by mouth every 4 hours as needed for cough                                   latanoprost 0.005 % ophthalmic solution   Commonly known as:  XALATAN   Place 1 drop into both eyes At Bedtime   Last time this was given:  1 drop on 8/12/2018  9:20 PM                                   potassium chloride SA 10 MEQ CR tablet    Commonly known as:  K-DUR/KLOR-CON M   Take 10 mEq by mouth daily                                   RESTASIS 0.05 % ophthalmic emulsion   Place 1 drop into both eyes 2 times daily   Generic drug:  cycloSPORINE                                      sulfamethoxazole-trimethoprim 800-160 MG per tablet   Commonly known as:  BACTRIM DS/SEPTRA DS   Take 1 tablet by mouth 2 times daily for 3 days                                      triamterene-hydrochlorothiazide 37.5-25 MG per capsule   Commonly known as:  DYAZIDE   Take 1 capsule by mouth daily                                             More Information        Urinary Tract Infections in Women    Urinary tract infections (UTIs) are most often caused by bacteria. These bacteria enter the urinary tract. The bacteria may come from outside the body. Or they may travel from the skin outside the rectum or vagina into the urethra. Female anatomy makes it easy for bacteria from the bowel to enter a woman s urinary tract, which is the most common source of UTI. This means women develop UTIs more often than men. Pain in or around the urinary tract is a common UTI symptom. But the only way to know for sure if you have a UTI for the healthcare provider to test your urine. The two tests that may be done are the urinalysis and urine culture.  Types of UTIs    Cystitis. A bladder infection (cystitis) is the most common UTI in women. You may have urgent or frequent urination. You may also have pain, burning when you urinate, and bloody urine.    Urethritis. This is an inflamed urethra, which is the tube that carries urine from the bladder to outside the body. You may have lower stomach or back pain. You may also have urgent or frequent urination.    Pyelonephritis. This is a kidney infection. If not treated, it can be serious and damage your kidneys. In severe cases, you may need to stay in the hospital. You may have a fever and lower back pain.  Medicines to treat a UTI  Most UTIs  are treated with antibiotics. These kill the bacteria. The length of time you need to take them depends on the type of infection. It may be as short as 3 days. If you have repeated UTIs, you may need a low-dose antibiotic for several months. Take antibiotics exactly as directed. Don t stop taking them until all of the medicine is gone. If you stop taking the antibiotic too soon, the infection may not go away. You may also develop a resistance to the antibiotic. This can make it much harder to treat.  Lifestyle changes to treat and prevent UTIs  The lifestyle changes below will help get rid of your UTI. They may also help prevent future UTIs.    Drink plenty of fluids. This includes water, juice, or other caffeine-free drinks. Fluids help flush bacteria out of your body.    Empty your bladder. Always empty your bladder when you feel the urge to urinate. And always urinate before going to sleep. Urine that stays in your bladder can lead to infection. Try to urinate before and after sex as well.    Practice good personal hygiene. Wipe yourself from front to back after using the toilet. This helps keep bacteria from getting into the urethra.    Use condoms during sex. These help prevent UTIs caused by sexually transmitted bacteria. Also don't use spermicides during sex. These can increase the risk for UTIs. Choose other forms of birth control instead. For women who tend to get UTIs after sex, a low-dose of a preventive antibiotic may be used. Be sure to discuss this option with your healthcare provider.    Follow up with your healthcare provider as directed. He or she may test to make sure the infection has cleared. If needed, more treatment may be started.  Date Last Reviewed: 1/1/2017 2000-2017 [employer]. [address] [place] [address], [place] [address]. All rights reserved. This information is not intended as a substitute for professional medical care. Always follow your healthcare professional's  instructions.

## 2018-08-11 NOTE — IP AVS SNAPSHOT
` ` Patient Information     Patient Name Sex DOB Topitzhofer, Jean M ([id number]) Female 1934       Room Bed    0304 1049-90      Patient Demographics     Address Phone    [address]  [facility] [address] [phone] (Home) *Preferred*  NONE (Work)  NONE (Mobile)      Patient Ethnicity & Race     Ethnic Group Patient Race    American White      Emergency Contact(s)     Name Relation Home Work Mobile    JACKSON MARS Relative [phone] NONE [phone]      Documents on File        Status Date Received Description       Documents for the Patient    Immunization Record   Influenza,   Benny, 10-02-12    Privacy Notice - [place] Received 12     External Medication Information Consent Accepted 13     Patient ID Received 14     Consent for Services - Hospital/Clinic Received but Refused Research () 13     Insurance Card       Insurance Card Received 13 [employer]    Insurance Card Received 13 Medicare    Consent for EHR Access  13 Copied from existing Consent for services - C/HOD collected on 2013    Immunization Record   Ana Rosakelys, Influenza, 13    [facility] Specified Other       Consent for Services - Hospital/Clinic Received () 14     Privacy Notice - [place] Received 07/10/14     Consent to Communicate   Consent to Communicate    Advance Directives and Living Will Received 01/14/15 HEALTH CARE DIRECTIVE 06    Advance Directives and Living Will Not Received  VALIDATION OF AD 06    Insurance Card Received 06/15/15 [facility]    Consent for Services - Hospital/Clinic Received () 12/24/15     Consent for Services/Privacy Notice - Hospital/Clinic       Consent for Services/Privacy Notice - Hospital/Clinic Received () 16     HIM CHEYANNE Authorization - File Only  16 [facility]    HIM CHEYANNE Authorization  16 [facility]    HIM CHEYANNE Authorization - File Only   03/11/16 [facility]    HIM CHEYANNE Authorization  [facility]    Consent for Services - Hospital and Clinic Received 08/11/18     HIE Auth Received 08/11/18     Patient ID Received 08/11/18 LIFET[facility]    Insurance Card  (Deleted)         Documents for the Encounter    CMS IM for Patient Signature Received 08/11/18     Discharge Attachment   URINARY TRACT INFECTIONS IN WOMEN (ENGLISH)      Admission Information     Attending Provider Admitting Provider Admission Type Admission Date/Time      Emergency 08/11/18  1358    Discharge Date Hospital Service Auth/Cert Status Service Area     General Medicine [facility]    Unit Room/Bed Admission Status        3 MEDICAL SURGICAL 0308/0308-01 Admission (Confirmed)       Admission     Complaint    UTI (urinary tract infection)      Hospital Account     Name Acct ID Class Status Primary Coverage    Topitzhofer, Jean M [id number] Inpatient Open MEDICARE - MEDICARE            Guarantor Account (for Hospital Account #[id number])     Name Relation to Pt Service Area Active? Acct Type    Topitzhofer, Jean M Self FCS Yes Personal/Family    Address Phone          [address]  [place] [address] [phone](H)  NONE(O)              Coverage Information (for Hospital Account #[id number])     F/O Payor/Plan Precert #    MEDICARE/MEDICARE     Subscriber Subscriber #    Topitzhofer, Jean M [id number]    Address Phone    ATTN CLAIMS  [address]  [facility] [place] [address] [phone]

## 2018-08-11 NOTE — IP AVS SNAPSHOT
"[place] [address] MEDICAL SURGICAL: [phone]                                              INTERAGENCY TRANSFER FORM - PHYSICIAN ORDERS   2018                    Hospital Admission Date: 2018  JEAN M TOPITZHOFER   : 1934  Sex: Female        Attending Provider: (none)    Allergies:  No Known Allergies    Infection:  None   Service:  GENERAL [facility]    Ht:  1.575 m (5' 2\")   Wt:  49 kg (108 lb)   Admission Wt:  49 kg (108 lb)    BMI:  19.75 kg/m 2   BSA:  1.46 m 2            Patient PCP Information     Provider PCP Type    [facility]      ED Clinical Impression     Diagnosis Description Comment Added By Time Added    Urinary tract infection without hematuria, site unspecified [N39.0] Urinary tract infection without hematuria, site unspecified [N39.0]  Paolo Elizondo MD 2018  3:21 PM    Sinus bradycardia [R00.1] Sinus bradycardia [R00.1]  Paolo Elizondo MD 2018  3:21 PM    Weakness generalized [R53.1] Weakness generalized [R53.1]  Paolo Elizondo MD 2018  3:21 PM      Hospital Problems as of 2018              Priority Class Noted POA    UTI (urinary tract infection) Medium  2018 Yes      Non-Hospital Problems as of 2018              Priority Class Noted    Hearing loss Medium  1/15/2013    Essential hypertension Medium  1/15/2013    Glaucoma Medium  2013    Allergic rhinitis, seasonal Medium  2013    Confusion Medium  2015      Code Status History     Date Active Date Inactive Code Status Order ID Comments User Context    2018  9:49 AM  Full Code [id number]  Isidro Mcfarlane MD Outpatient    2018  5:11 PM 2018  9:49 AM Full Code [id number]  Joselo Llanes MD Inpatient    2015  1:20 PM 2018  5:11 PM DNR [id number]  Ilene Petersen MD Outpatient    2014  4:04 PM 2015  1:20 PM DNR [id number]  Edith Clemons MD Inpatient         Medication Review      START taking        Dose / " Directions Comments    sulfamethoxazole-trimethoprim 800-160 MG per tablet   Commonly known as:  BACTRIM DS/SEPTRA DS   Used for:  Urinary tract infection without hematuria, site unspecified        Dose:  1 tablet   Take 1 tablet by mouth 2 times daily for 3 days   Quantity:  6 tablet   Refills:  0          CONTINUE these medications which have NOT CHANGED        Dose / Directions Comments    acetaminophen 500 MG tablet   Commonly known as:  TYLENOL        Dose:  1000 mg   Take 1,000 mg by mouth 3 times daily as needed for mild pain   Refills:  0        ANTACID EXTRA STRENGTH 750 MG Chew   Generic drug:  calcium carbonate        Dose:  1500 mg   Take 1,500 mg by mouth daily as needed   Refills:  0        AVENOVA EX        Dose:  1 spray   Externally apply 1 spray topically daily Spray to closed eyes covering eye lids   Refills:  0        brimonidine 0.15 % ophthalmic solution   Commonly known as:  ALPHAGAN-P        Dose:  1 drop   Place 1 drop into the right eye 3 times daily   Refills:  0        CENTRUM Tabs   Used for:  Routine general medical examination at a health care facility        Dose:  1 tablet   Take 1 tablet by mouth daily   Quantity:  30 tablet   Refills:  0        CERAVE Crea        Externally apply topically daily To boy legs   Refills:  0        dorzolamide-timolol 2-0.5 % ophthalmic solution   Commonly known as:  COSOPT        Dose:  1 drop   Place 1 drop into the right eye every morning   Refills:  0        fish oil-omega-3 fatty acids 1000 MG capsule        Dose:  2 g   Take 2 g by mouth daily   Refills:  0        guaiFENesin 100 MG/5ML Syrp   Commonly known as:  ROBITUSSIN        Dose:  10 mL   Take 10 mLs by mouth every 4 hours as needed for cough   Refills:  0        latanoprost 0.005 % ophthalmic solution   Commonly known as:  XALATAN        Dose:  1 drop   Place 1 drop into both eyes At Bedtime   Refills:  4        potassium chloride SA 10 MEQ CR tablet   Commonly known as:  K-DUR/KLOR-CON M         Dose:  10 mEq   Take 10 mEq by mouth daily   Refills:  0        RESTASIS 0.05 % ophthalmic emulsion   Generic drug:  cycloSPORINE        Dose:  1 drop   Place 1 drop into both eyes 2 times daily   Refills:  0        triamterene-hydrochlorothiazide 37.5-25 MG per capsule   Commonly known as:  DYAZIDE   Used for:  Hypertension, essential        Dose:  1 capsule   Take 1 capsule by mouth daily   Quantity:  30 capsule   Refills:  5                Summary of Visit     Reason for your hospital stay       UTI with weakness             After Care     Activity       Your activity upon discharge: activity as tolerated       Diet       Follow this diet upon discharge: Regular             Follow-Up Appointment Instructions     Future Labs/Procedures    Follow-up and recommended labs and tests      Comments:    With primary care provider within 1 week      Follow-Up Appointment Instructions     Follow-up and recommended labs and tests        With primary care provider within 1 week             Statement of Approval     Ordered          08/13/18 0949  I have reviewed and agree with all the recommendations and orders detailed in this document.  EFFECTIVE NOW     Approved and electronically signed by:  Isidro Mcfarlane MD

## 2018-08-11 NOTE — IP AVS SNAPSHOT
` `     [place] [address] MEDICAL SURGICAL: [phone]                 INTERAGENCY TRANSFER FORM - NOTES (H&P, Discharge Summary, Consults, Procedures, Therapies)   2018                    Hospital Admission Date: 2018  JEAN M TOPITZHOFER   : 1934  Sex: Female        Patient PCP Information     Provider PCP Type    [facility]         History & Physicals      H&P by Joselo Llanes MD at 2018  4:36 PM     Author:  Joselo Llanes MD Service:  Hospitalist Author Type:  Physician    Filed:  2018  4:52 PM Date of Service:  2018  4:36 PM Creation Time:  2018  4:36 PM    Status:  Signed :  Joselo Llanes MD (Physician)         [facility]    History and Physical  Hospitalist       Date of Admission:  2018  Date of Service (when I saw the patient): 18    Assessment & Plan   Jean M Topitzhofer is a 84 year old female with a history of dementia and hypertension who presents with weakness, fatigue, vomiting for the last 2 days.  Workup has revealed probable urinary tract infection with pyuria and dehydration.  Patient with initial soft blood pressures and also sinus bradycardia.    Summary:    1.  Urinary tract infection:    IV fluids    Rocephin 1 g every 24 hours    Await urine culture sensitivities    2.  Sinus bradycardia:  Etiology is less clear.  Reviewing her medication list there is no oral beta blocker or calcium channel blocker.  She is on eyedrops containing timolol which could theoretically cause bradycardia.  When speaking to her and causing some stimulation her heart rate does increase.  It is unclear how much of the bradycardia is could she reading to her symptoms versus the UTI.    continue eyedrops for now    Monitor on telemetry    Have atropine ready for symptomatic bradycardia    Consider cardiology consultation if persists or continues to be symptomatic.    If continuing symptoms consider changing  timolol    3.  Dementia: Likely Alzheimer's type    We'll continue to monitor    Avoid medications that will aggravate her dementia and behaviors    4.  Hypertension:      Continue Dyazide    Follow-up basic metabolic panel in a.m.    5.  Glaucoma: Continue eyedrops for now    DVT Prophylaxis: Pneumatic Compression Devices  Code Status: Full Code,  does have a NANCY ST which states full code status.  Did discuss with niece.    Disposition Plan   Expected discharge in 2-3 days to prior living arrangement once urinary tract infection and weakness have improved, stable bradycardia.     Entered: Joselo Llanes 08/11/2018, 4:36 PM       Joselo Llanes MD    Primary Care Physician   [facility]    Chief Complaint   Vomiting and fatigue    History is obtained from the patient, electronic health record, emergency department physician and patient's daughter.  Patient with dementia so history is unreliable.      History of Present Illness   Jean M Topitzhofer is a 84 year old female with dementia who presents with 2 days of fatigue and vomiting.  Per ED physician and niece patient lives in assisted living and is been noted to be more fatigued and had an episode of vomiting earlier today.  Blood pressure was checked and was noted to be 95/60 at the facility.  Patient has had some complaints over the last 2 days of this vomiting, fatigue, no nausea and some lightheadedness and weakness.  There has been a change in appetite over the course of last day and now the patient is unable to stand or ambulate on her own without assistance.  She has had no new medications.  There's been no diarrhea abdominal pain shortness of breath dysuria or falls.    She currently says she does not feel well and is very tired and vomited earlier today.    Past Medical History    Moderate dementia with behavioral disturbance  Seasonal allergic rhinitis  Glaucoma  Essential hypertension  Hearing loss    Past Surgical History   No  past surgical history    Prior to Admission Medications   Prior to Admission Medications   Prescriptions Last Dose Informant Patient Reported? Taking?   ADACEL 5-2-15.5 LF-MCG/0.5 injection   Yes No   DOXYCYCLINE HYCLATE PO   Yes No   Sig: Take 100 mg by mouth daily   Emollient (CERAVE EX)   Yes Yes   Eyelid Cleansers (AVENOVA EX)   Yes Yes   FLUZONE HIGH-DOSE 0.5 ML injection   Yes No   FLUZONE HIGH-DOSE injection   Yes No   Multiple Vitamins-Minerals (CENTRUM) TABS   No Yes   Sig: Take 1 tablet by mouth daily   acetaminophen (TYLENOL) 325 MG tablet   No Yes   Sig: Take 2 tablets (650 mg) by mouth every 6 hours as needed for mild pain   aspirin 81 MG tablet   No No   Sig: Take 1 tablet (81 mg) by mouth daily   dorzolamide-timolol (COSOPT) 2-0.5 % ophthalmic solution   Yes Yes   fish oil-omega-3 fatty acids 1000 MG capsule   Yes Yes   Sig: Take 2 g by mouth daily   latanoprost (XALATAN) 0.005 % ophthalmic solution   Yes Yes   metoprolol (LOPRESSOR) 50 MG tablet   No No   Sig: TAKE 1 TABLET BY MOUTH TWICE DAILY   mupirocin (BACTROBAN) 2 % ointment   No No   Sig: Apply topically 3 times daily   timolol (TIMOPTIC) 0.25 % ophthalmic solution   No No   Sig: Place 1 drop into the right eye 2 times daily   triamterene-hydrochlorothiazide (DYAZIDE) 37.5-25 MG per capsule   No Yes   Sig: Take 1 capsule by mouth daily      Facility-Administered Medications: None     Allergies   No Known Allergies    Social History   I have reviewed this patient's social history and updated it with pertinent information if needed. Jean M Topitzhofer[PK1.1]  reports that she has never smoked. She has never used smokeless tobacco. She reports that she does not drink alcohol or use illicit drugs.[PK1.2]    Family History   I have reviewed this patient's family history and updated it with pertinent information if needed.[PK1.1]   Family History   Problem Relation Age of Onset     Diabetes Maternal Grandmother      Hypertension Mother      Aneurysm  Son[PK1.2]        Review of Systems   The 10 point Review of Systems is negative other than noted in the HPI or here.     Physical Exam   Temp: 97.4  F (36.3  C) Temp src: Oral BP: 132/69 Pulse: (!) 44 Heart Rate: 46 Resp: 16 SpO2: 94 %      Vital Signs with Ranges  Temp:  [97.4  F (36.3  C)] 97.4  F (36.3  C)  Pulse:  [44] 44  Heart Rate:  [39-46] 46  Resp:  [16] 16  BP: (113-143)/(60-89) 132/69  SpO2:  [93 %-96 %] 94 %  108 lbs 0 oz    Constitutional: Awake, alert, cooperative, no apparent distress  HEENT:  Tacky to Moist mucous membranes, , Conjunctiva and pupils normal.  No thyroid abnormality  Respiratory: Clear to auscultation bilaterally, no crackles or wheezing.  Cardiovascular: Bradycardia with rate of 40-45, normal S1 and S2, and no murmur noted.  GI: Soft, non-distended, non-tender, normal bowel sounds, no hepatosplenomegaly   Lymph/Hematologic: No anterior cervical  adenopathy.  Skin: No rashes, no cyanosis, no edema.  Musculoskeletal: No joint swelling, erythema or tenderness.  Neurologic: Alert, oriented to person, did not test gait or balance.  Psychiatric:  no obvious anxiety or depression. Normal affect    Data   Data reviewed today:  I personally reviewed the EKG tracing showing Sinus bradycardia 45 bpm.    Recent Labs  Lab 08/11/18  1611 08/11/18  1500 08/11/18  1428   WBC  --   --  8.5   HGB  --   --  15.0   MCV  --   --  96   PLT  --   --  264   NA  --   --  137   POTASSIUM 4.5  --  5.2   CHLORIDE  --   --  102   CO2  --   --  30   BUN  --   --  18   CR  --   --  0.73   ANIONGAP  --   --  5   TOBY  --   --  9.2   GLC  --   --  100*   ALBUMIN  --   --  3.0*   PROTTOTAL  --   --  7.2   BILITOTAL  --   --  0.8   ALKPHOS  --   --  88   ALT  --   --  40   AST  --   --  80*   TROPONIN  --  0.00  --        Imaging:  No results found for this or any previous visit (from the past 24 hour(s)).[PK1.1]       Revision History        User Key Date/Time User Provider Type Action    > PK1.2 8/11/2018  4:52 PM  Joselo Llanes MD Physician Sign     PK1.1 8/11/2018  4:36 PM Joselo Llanes MD Physician                   Discharge Summaries     No notes of this type exist for this encounter.      Consult Notes     No notes of this type exist for this encounter.         Progress Notes - Physician (Notes from 08/10/18 through 08/13/18)      Progress Notes by Isidro Mcfarlane MD at 8/13/2018  9:49 AM     Author:  Isidro Mcfarlane MD Service:  Hospitalist Author Type:  Physician    Filed:  8/13/2018  9:50 AM Date of Service:  8/13/2018  9:49 AM Creation Time:  8/13/2018  9:49 AM    Status:  Signed :  Isidro Mcfarlane MD (Physician)         I discussed discharge with patient.  30 min spent on discharge.[DF1.1]      Revision History        User Key Date/Time User Provider Type Action    > DF1.1 8/13/2018  9:50 AM Isidro Mcfarlane MD Physician Sign            Progress Notes by Isidro Mcfarlane MD at 8/12/2018 12:15 PM     Author:  Isidro Mcfarlane MD Service:  Hospitalist Author Type:  Physician    Filed:  8/12/2018 12:24 PM Date of Service:  8/12/2018 12:15 PM Creation Time:  8/12/2018 12:15 PM    Status:  Signed :  Isidro Mcfarlane MD (Physician)         [facility]  Hospitalist Progress Note    Isidro Mcfarlane MD 08/12/2018  Text Page      Reason for Stay (Diagnosis): UTI         Assessment and Plan:      Summary of Stay:  Jean M Topitzhofer is a 84 year old female with a history of dementia and hypertension who presents with weakness, fatigue, vomiting for the last 2 days.  Workup has revealed probable urinary tract infection with pyuria and dehydration.  Patient with initial soft blood pressures and also sinus bradycardia.     Summary:     1.  Urinary tract infection:     -Symptoms improving, no longer vomiting and is tolerating oral intake.    Afebrile with normal white blood cell count    Continue Rocephin 1 g every 24 hours (initiated on  "8/11)    Urine culture positive for E. coli sensitivities pending    Assess mental status, strength and ability to take oral intake today.     2.  Sinus bradycardia:  Etiology is less clear.  Reviewing her medication list there is no oral beta blocker or calcium channel blocker.  She is on eyedrops containing timolol which could theoretically cause bradycardia.  When speaking to her and causing some stimulation her heart rate does increase.  It is unclear how much of the bradycardia is could she reading to her symptoms versus the UTI.    continue eyedrops for now    Monitor on telemetry-heart rate is increased some she does not clearly have symptoms, rate is in the 40s-50s    Have atropine ready for symptomatic bradycardia    Consider cardiology consultation if persists or continues to be symptomatic.    If continuing symptoms consider changing timolol     3.  Dementia: Likely Alzheimer's type    We'll continue to monitor    Avoid medications that will aggravate her dementia      4.  Hypertension:      Continue Dyazide       5.  Glaucoma: Continue eyedrops for now     DVT Prophylaxis: Pneumatic Compression Devices  Code Status: Full Code,  POLST states full code status.  Did discuss with niece.  Disposition Plan   Expected discharge in 1-2 days to prior living arrangement once functional status back to baseline.     Entered: Isidro Mcfarlane 08/12/2018, 12:15 PM     Incidental Findings/ Discharge Issues: None            Interval History (Subjective):      Just waking up.  Feels ok.  Mildly confused.                  Physical Exam:      Last Vital Signs:  BP 98/49  Pulse (!) 44  Temp 96.1  F (35.6  C) (Oral)  Resp 14  Ht 1.575 m (5' 2\")  Wt 49 kg (108 lb)  LMP  (LMP Unknown)  SpO2 94%  BMI 19.75 kg/m2 afebrile      Vital signs reviewed  General:  Alert, calm, NAD  CV: regular rate and rhythm, no murmurs or rubs  Lungs:  Clear to ascultation bilaterally, normal respiratory effort  Abdomen:  Soft, " nontender, nondistended, no masses, normal bowel sounds  Extremities:  No edema  Neuro: normal strength and sensation in all 4 extremities, cranial nerves grossly intact  Psychiatric:  Calm, confused             Medications:      All current medications were reviewed with changes reflected in problem list.         Data:      All new lab and imaging data was reviewed.   Labs:  Creatinine 0.76[DF1.1]     Revision History        User Key Date/Time User Provider Type Action    > DF1.1 8/12/2018 12:24 PM Isidro Mcfarlane MD Physician Sign            ED Notes by Sia Iraheta RN at 8/11/2018  3:24 PM     Author:  Sia Iraheta RN Service:  (none) Author Type:  Registered Nurse    Filed:  8/11/2018  4:45 PM Date of Service:  8/11/2018  3:24 PM Creation Time:  8/11/2018  3:24 PM    Status:  Addendum :  Sia Iraheta RN (Registered Nurse)         [facility]  ED Nurse Handoff Report    Jean M Topitzhofer is a 84 year old female   ED Chief complaint:[MC1.1] vomiting, low blood pressure per AL[MC1.2]  . ED Diagnosis:[MC1.1]   Final diagnoses:   Urinary tract infection without hematuria, site unspecified   Sinus bradycardia   Weakness generalized[MC1.2]     Allergies:[MC1.1] No Known Allergies[MC1.2]    Code Status: Full Code  Activity level - Baseline/Home:  Independent. Activity Level - Current:   Stand with Assist. Lift room needed: No. Bariatric: No   Needed: No   Isolation: No. Infection: Not Applicable.     Vital Signs:[MC1.1]   Vitals:    08/11/18 1415 08/11/18 1430 08/11/18 1445 08/11/18 1500   BP: 128/77 113/72 126/89 122/65   Pulse:       Resp:       Temp:       TempSrc:       SpO2: 96% 96% 95% 96%   Weight:       Height:[MC1.2]           Cardiac Rhythm:  ,      Pain level:[MC1.1] 0-10 Pain Scale: 0[MC1.2]  Patient confused: Yes. Patient Falls Risk: Yes.   Elimination Status: Has voided   Patient Report - Initial Complaint: Pt was not eating or drinking well a assisted living.  Staff thought BP's were running low.  . Focused Assessment: Pt BP's have been good but she is bradycardic in the high 30's and low 40's. Pt has dementia but is very well tempered and cooperative. Pt was straight cathed and discovered to have a UTI.  Pt is very sleepy  Tests Performed: labs. Abnormal Results: see labs.   Treatments provided: Zofran, fluids and antibiotics  Family Comments: Pt niece and guardian present by bedside. Niece is a RN at [employer].   OBS brochure/video discussed/provided to patient:  N/A  ED Medications:[MC1.1]   Medications   0.9% sodium chloride BOLUS (500 mLs Intravenous New Bag 8/11/18 1447)   ondansetron (ZOFRAN) injection 2 mg (not administered)   cefTRIAXone (ROCEPHIN) 1 g vial to attach to  mL bag for ADULTS or NS 50 mL bag for PEDS (not administered)[MC1.2]     Drips infusing:  Yes  For the majority of the shift, the patient's behavior Green. Interventions performed were none.     Severe Sepsis OR Septic Shock Diagnosis Present: No      ED Nurse Name/Phone Number: Renetta Manzanares,   3:25 PM[MC1.1]    RECEIVING UNIT ED HANDOFF REVIEW    Above ED Nurse Handoff Report was reviewed: Yes  Reviewed by: SIA STINSON on August 11, 2018 at 4:44 PM (Room now clean to accept pt)[AJ1.1]       Revision History        User Key Date/Time User Provider Type Action    > [N/A] 8/11/2018  4:45 PM Sia Stinson RN Registered Nurse Addend     AJ1.1 8/11/2018  4:44 PM Sia Stinson RN Registered Nurse Addend     MC1.2 8/11/2018  3:30 PM Renetta Mckeon, RN Registered Nurse Sign     MC1.1 8/11/2018  3:24 PM Renetta Mckeon RN Registered Nurse             ED Provider Notes by Paolo Elizondo MD at 8/11/2018  1:58 PM     Author:  Paolo Elizondo MD Service:  Emergency Medicine Author Type:  Physician    Filed:  8/11/2018  4:37 PM Date of Service:  8/11/2018  1:58 PM Creation Time:  8/11/2018  2:09 PM    Status:  Signed :  Paolo Elizondo MD (Physician)            History     Chief Complaint:  Vomiting[OC1.1] and hypotension[OC1.2]     HPI   Jean M Topitzhofer is a 84 year old female with a history of dementia who presents to the emergency department today for evaluation of emesis and fatigue. The patient lives in an assisted living home at the memory care unit and is in the ED today with her niece for evaluation of her recent fatigue after an episode of emesis earlier today coupled with a blood pressure of 95/60. Since two days ago, the patient reports[OC1.1] emesis,[OC1.2] fatigue, nausea, light-headedness,[OC1.1] and[OC1.2] weakness. As of yesterday, the patient endorses change in appetite, and change in activity which includes the inability to stand up or ambulate. The patient has taken her medicine today but could not get up and did not eat anything until lunch time and promptly experienced emesis afterwards. The patient denies syncope, diarrhea, abdominal pain, change in frequency, dysuria, changes to her current medications, injuries, or a history of thyroid issues.     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  Smoking Status: Never Smoker  Smokeless Tobacco: Never Used  Alcohol Use: Negative[OC1.3]  Marital Status: Marrie[OC1.1]d[OC1.3]    Review of Systems   Constitutional: Positive for[OC1.1] activity change[OC1.2],[OC1.1] appetite change[OC1.2] and[OC1.1] fatigue[OC1.2].   Gastrointestinal: Positive for[OC1.1] nausea[OC1.2] and[OC1.1] vomiting[OC1.2]. Negative for[OC1.1]  "diarrhea[OC1.2].   Genitourinary: Negative for dysuria and frequency.   Neurological: Positive for[OC1.1] weakness[OC1.2] and[OC1.1] light-headedness[OC1.2]. Negative for[OC1.1] syncope[OC1.2].[OC1.1]   All other systems reviewed and are negative[OC1.4].      Physical Exam[OC1.1]     Patient Vitals for the past 24 hrs:   BP Temp Temp src Pulse Heart Rate Resp SpO2 Height Weight   08/11/18 1545 132/69 - - - 46 - 94 % - -   08/11/18 1530 130/64 - - - 45 - 95 % - -   08/11/18 1515 119/60 - - - 39 - 93 % - -   08/11/18 1500 122/65 - - - 42 - 96 % - -   08/11/18 1445 126/89 - - - 45 - 95 % - -   08/11/18 1430 113/72 - - - 44 - 96 % - -   08/11/18 1415 128/77 - - - 44 - 96 % - -   08/11/18 1407 143/73 97.4  F (36.3  C) Oral (!) 44 - 16 96 % 1.575 m (5' 2\") 49 kg (108 lb)[OC1.5]     Physical Exam    Vital signs and nursing notes reviewed.     Constitutional: laying on gurney appears[OC1.1] fatigued[LM1.1]  HENT: Oropharynx is clear and m[OC1.1]oderately dry[LM1.1]  Eyes: Conjunctivae are normal bilaterally. Pupils equal  Neck: normal range of motion  Cardiovascular:[OC1.1] bradycardic[LM1.1] rate, regular rhythm, normal heart sounds.   Pulmonary/Chest: Effort normal and breath sounds normal. No respiratory distress.   Abdominal: Soft. Bowel sounds are normal. No tenderness to palpation. No rebound or guarding.   Musculoskeletal: No joint swelling or edema.   Neurological: Alert[OC1.1] fatigued.  No focal weakness.  Mild dementia[LM1.1]  Skin: Skin is warm and dry. No rash noted.   Psych: normal affect    Emergency Department Course     ECG:  ECG taken at 1410, ECG read at 1420  Sinus bradycardia  Left axis deviation  Right bundle branch block  Septal infarct, age undetermined  Abnormal ECG  Rate 45 bpm. AR interval 164 ms. QRS duration 130 ms. QT/QTc 522/451 ms. P-R-T axes 34 -38 -16.    Laboratory:  Laboratory findings were communicated with the[OC1.1] patient[OC1.3] who voiced understanding of the " findings.[OC1.1]    Potassium:[OC1.6] 4.5[OC1.7] (repeat)[LM1.2]  Magnesium:[OC1.6] 1.7[OC1.7]  Lactic Acid (Resulted: 1510): 0.9, PCO2 54 (H), PO2 22 (L), Bicarbonate 31 (H) o/w WNL  Troponin (Collected 1500): 0.00  UA with Microscopic: Nitrate Positive (A), Leukocyte Esterase Moderate (A), WBC/HPF 84 (H), Bacteria Many (A) o/w WNL  Urine Culture Aerobic Bacterial: Pending[OC1.3]   CBC: WBC[OC1.1] 8.5[OC1.3], HGB[OC1.1] 15.0[OC1.3], PLT[OC1.1] 264  B[OC1.3]MP:[OC1.1] Glucose 100 (H)[OC1.3] o/w WNL (Creatinine[OC1.1] 0.73[OC1.3])[OC1.1]  Hepatic panel: L Albumin 3.0 (L), AST 80 (H), o/w WNL[OC1.3]    Interventions:[OC1.1]  1447  mL IV[OC1.3]  1538 Rocephin 1 g IV  1539 Zofran 2 mg PO[OC1.8]     Emergency Department Course:[OC1.1]    1407[OC1.3] Nursing notes and vitals reviewed.    1409 I performed an exam of the patient as documented above.     1410 EKG obtained.[OC1.1]     1428 IV was inserted and blood was drawn for laboratory testing, results above.    1445 The patient provided a urine sample here in the emergency department. This was sent for laboratory testing, findings above.    1500 Troponin levels were collected.     1505:  Lactic acid levels were collected.     1517 Recheck and update.[OC1.3]    1539 I spoke with Dr. Garcia of the Hospilist service from [facility] regarding patient's presentation, findings, and plan of care.[OC1.8]    1633[OC1.7] I personally reviewed the[OC1.1] laboratory[OC1.7] results with the[OC1.1] patient[OC1.7] and answered all related questions prior to[OC1.1] admission[OC1.7].    Impression & Plan      Medical Decision Making:  Jean M Topitzhofer is a 84 year old female who presents to the emergency department today for evaluation of[OC1.1] general fatigue and nausea.[OC1.6]  On p[LM1.2]resentation patient was note[OC1.6]d[LM1.2] to have a sinus brachycardiac in the mid 40s consistently. She had no hypotension or chest pain complaints. Initial lab test did not show any  suggestion of any electrolyte abnormality and she is not on any beta blocker medications other than eye drops[OC1.6] with review of her[LM1.2] Med[OC1.6]ication list.[LM1.2]  EKG shows no signs of ischemic changes. Her urinalysis does show findings consistent with urinary tract infection. She's given IV fluids and started on rocephin. During her emergency department st[OC1.6]ay s[LM1.2]he had no hypotension, she had occasional heart rates in the upper 30s but was most consistently[OC1.6] is a sinus bradycardia of[LM1.2] 45[OC1.6] bpm. I[LM1.2]t[OC1.6] is[LM1.2] unclear the illness is exacerbating baseline bradycardia or this is a suggestion she may need cardiac intervention if this does not resolve after treating her underlying illness. I discussed this case with Dr. Garcia and she will be admitted into a telemetry bed.[OC1.6]     Diagnosis:[OC1.1]    ICD-10-CM   1. Urinary tract infection without hematuria, site unspecified N39.0       2. Sinus bradycardia R00.1   3. Weakness generalized R53.1[OC1.9]     Disposition:[OC1.1]   The patient is admitted into the care of Dr. Garcia.[OC1.8]    Scribe Disclosure:  Jason CHOI, am serving as a scribe at 2:12 PM on 8/11/2018 to document services personally performed by Paolo Elizondo MD based on my observations and the provider's statements to me.      [facility] EMERGENCY DEPARTMENT[OC1.1]       Paolo Elizondo MD  08/11/18 1637  [LM1.3]     Revision History        User Key Date/Time User Provider Type Action    > LM1.3 8/11/2018  4:37 PM Paolo Elizondo MD Physician Sign     OC1.7 8/11/2018  4:34 PM Jason Leeibe Share     LM1.2 8/11/2018  4:34 PM Paolo Elizondo MD Physician      [N/A] 8/11/2018  4:29 PM Jason Lee Scribe Share     [N/A] 8/11/2018  4:20 PM Jason Lee Share     OC1.5 8/11/2018  4:20 PM Jason Lee     OC1.6 8/11/2018  3:58 PM Jason Lee      OC1.9 8/11/2018  3:48 PM Jason Lee  Scribe Share     OC1.8 8/11/2018  3:45 PM Rosa, Jason Scribe      LM1.1 8/11/2018  3:32 PM Paolo Elizondo MD Physician Share     OC1.3 8/11/2018  3:29 PM Rosa, Jason Scribe Share     OC1.4 8/11/2018  2:54 PM Rosa, Jason Scribe Share     [N/A] 8/11/2018  2:29 PM Rosa, Jsaon Scribe Share     OC1.2 8/11/2018  2:27 PM Rosa, Jason Scribe Share     OC1.1 8/11/2018  2:09 PM Rosa, Jason Scribe                   Procedure Notes     No notes of this type exist for this encounter.      Progress Notes - Therapies (Notes from 08/10/18 through 08/13/18)     No notes of this type exist for this encounter.

## 2018-08-11 NOTE — IP AVS SNAPSHOT
[place] [address] [facility]    [address]    [facility] [address]    Phone:  [phone]    Fax:  [phone]                                       After Visit Summary   8/11/2018    Jean M Topitzhofer    MRN: [id number]           After Visit Summary Signature Page     I have received my discharge instructions, and my questions have been answered. I have discussed any challenges I see with this plan with the nurse or doctor.    ..........................................................................................................................................  Patient/Patient Representative Signature      ..........................................................................................................................................  Patient Representative Print Name and Relationship to Patient    ..................................................               ................................................  Date                                            Time    ..........................................................................................................................................  Reviewed by Signature/Title    ...................................................              ..............................................  Date                                                            Time

## 2018-08-11 NOTE — ED NOTES
[facility]  ED Nurse Handoff Report    Jean M Topitzhofer is a 84 year old female   ED Chief complaint: vomiting, low blood pressure per AL  . ED Diagnosis:   Final diagnoses:   Urinary tract infection without hematuria, site unspecified   Sinus bradycardia   Weakness generalized     Allergies: No Known Allergies    Code Status: Full Code  Activity level - Baseline/Home:  Independent. Activity Level - Current:   Stand with Assist. Lift room needed: No. Bariatric: No   Needed: No   Isolation: No. Infection: Not Applicable.     Vital Signs:   Vitals:    08/11/18 1415 08/11/18 1430 08/11/18 1445 08/11/18 1500   BP: 128/77 113/72 126/89 122/65   Pulse:       Resp:       Temp:       TempSrc:       SpO2: 96% 96% 95% 96%   Weight:       Height:           Cardiac Rhythm:  ,      Pain level: 0-10 Pain Scale: 0  Patient confused: Yes. Patient Falls Risk: Yes.   Elimination Status: Has voided   Patient Report - Initial Complaint: Pt was not eating or drinking well a assisted living. Staff thought BP's were running low.  . Focused Assessment: Pt BP's have been good but she is bradycardic in the high 30's and low 40's. Pt has dementia but is very well tempered and cooperative. Pt was straight cathed and discovered to have a UTI.  Pt is very sleepy  Tests Performed: labs. Abnormal Results: see labs.   Treatments provided: Zofran, fluids and antibiotics  Family Comments: Pt niece and guardian present by bedside. Niece is a RN at [employer] [facility] [place].   OBS brochure/video discussed/provided to patient:  N/A  ED Medications:   Medications   0.9% sodium chloride BOLUS (500 mLs Intravenous New Bag 8/11/18 1447)   ondansetron (ZOFRAN) injection 2 mg (not administered)   cefTRIAXone (ROCEPHIN) 1 g vial to attach to  mL bag for ADULTS or NS 50 mL bag for PEDS (not administered)     Drips infusing:  Yes  For the majority of the shift, the patient's behavior Green. Interventions performed were none.     Severe Sepsis OR  Septic Shock Diagnosis Present: No      ED Nurse Name/Phone Number: Renetta Manzanares,   3:25 PM    RECEIVING UNIT ED HANDOFF REVIEW    Above ED Nurse Handoff Report was reviewed: Yes  Reviewed by: WILD STISNON on August 11, 2018 at 4:44 PM (Room now clean to accept pt)

## 2018-08-11 NOTE — IP AVS SNAPSHOT
"` `           [place] [address] [facility]: [phone]                                              INTERAGENCY TRANSFER FORM - NURSING   2018                    Hospital Admission Date: 2018  JEAN M TOPITZHOFER   : 1934  Sex: Female        Attending Provider: (none)    Allergies:  No Known Allergies    Infection:  None   Service:  GENERAL MEDI    Ht:  1.575 m (5' 2\")   Wt:  49 kg (108 lb)   Admission Wt:  49 kg (108 lb)    BMI:  19.75 kg/m 2   BSA:  1.46 m 2            Patient PCP Information     Provider PCP Type    [facility]      Current Code Status     Date Active Code Status Order ID Comments User Context       Prior      Code Status History     Date Active Date Inactive Code Status Order ID Comments User Context    2018  9:49 AM  Full Code [id number]  Isidro Mcfarlane MD Outpatient    2018  5:11 PM 2018  9:49 AM Full Code [id number]  Joselo Llanes MD Inpatient    2015  1:20 PM 2018  5:11 PM DNR [id number]  Ilene Petersen MD Outpatient    2014  4:04 PM 2015  1:20 PM DNR [id number]  Edith Clemons MD Inpatient      Advance Directives        Scanned docmt in ACP Activity?           Yes, scanned ACP docmt        Hospital Problems as of 2018              Priority Class Noted POA    UTI (urinary tract infection) Medium  2018 Yes      Non-Hospital Problems as of 2018              Priority Class Noted    Hearing loss Medium  1/15/2013    Essential hypertension Medium  1/15/2013    Glaucoma Medium  2013    Allergic rhinitis, seasonal Medium  2013    Confusion Medium  2015      Immunizations     Name Date      Influenza (High Dose) 3 valent vaccine 09/23/15     Influenza (IIV3) PF 13     Influenza (IIV3) PF 10/02/12     Pneumo Conj 13-V (2010&after) 16     Pneumococcal 23 valent 14     TDAP Vaccine (Adacel) 14          END      ASSESSMENT     Discharge Profile Flowsheet  " "   EXPECTED DISCHARGE     Resources List  Other (Comments) ([facility] Adult Protection) 03/07/16 1131    Expected Discharge Date  08/14/18 ([facility], ) 08/12/18 0757   Existing Resources/Services  Home Care 01/29/15 1526    DISCHARGE NEEDS ASSESSMENT     SKIN      Concerns To Be Addressed  other (see comments) 03/07/16 1132   Inspection of bony prominences  Full 08/13/18 0934    Concerns Comments  Transferring care outside of [place]. Adult protection is involved. 03/07/16 1132   Inspection under devices  Full 08/13/18 0934    Equipment Used at Home  cane, straight;walker, rolling (transfer belt) 03/07/16 1131   Skin WDL  WDL 08/13/18 0934    GASTROINTESTINAL (ADULT,PEDIATRIC,OB)     Skin Temperature  warm 08/13/18 0934    GI WDL  ex 08/13/18 0934   Skin Moisture  dry 08/13/18 0934    Last Bowel Movement  08/11/18 08/13/18 0032   Skin Elasticity  slow return to original state 08/12/18 1642    GI Signs/Symptoms  no gastrointestinal signs/symptoms 08/13/18 0934   Skin Integrity  intact 08/13/18 0934    Passing flatus  yes 08/13/18 0032   SAFETY      COMMUNICATION ASSESSMENT     Safety WDL  WDL 08/13/18 0934    Patient's communication style  spoken language (English or Bilingual) 08/11/18 1401   Safety Factors  patient up in chair;bed in low position;wheels locked;call light in reach;upper side rails raised x 2;ID band on 08/13/18 0934    FINAL RESOURCES     All Alarms  alarm(s) activated and audible 08/13/18 0934                 Assessment WDL (Within Defined Limits) Definitions           Safety WDL     Effective: 09/28/15    Row Information: <b>WDL Definition:</b> Bed in low position, wheels locked; call light in reach; upper side rails up x 2; ID band on<br> <font color=\"gray\"><i>Item=AS safety wdl>>List=AS safety wdl>>Version=F14</i></font>      Skin WDL     Effective: 09/28/15    Row Information: <b>WDL Definition:</b> Warm; dry; intact; elastic; without discoloration; pressure points without " "redness<br> <font color=\"gray\"><i>Item=AS skin wdl>>List=AS skin wdl>>Version=F14</i></font>      Vitals     Vital Signs Flowsheet     VITAL SIGNS     Height  1.575 m (5' 2\") 08/11/18 1410    Temp  98  F (36.7  C) 08/13/18 0802   Height Method  Stated 08/11/18 1410    Temp src  Oral 08/13/18 0802   Weight  49 kg (108 lb) 08/11/18 1410    Resp  16 08/13/18 0802   BSA (Calculated - sq m)  1.46 08/11/18 1410    Pulse  (!)  44 08/11/18 1410   BMI (Calculated)  19.79 08/11/18 1410    Heart Rate  60 08/13/18 0802   JULIETA COMA SCALE      Pulse/Heart Rate Source  Monitor 08/13/18 0006   Best Eye Response  4-->(E4) spontaneous 08/11/18 2025    BP  165/67 08/13/18 0802   Best Motor Response  6-->(M6) obeys commands 08/11/18 2025    BP Location  Left arm 08/13/18 0802   Best Verbal Response  4-->(V4) confused 08/11/18 2025    Pain Score  NO PAIN (0) 08/13/18 0006   Julieta Coma Scale Score  14 08/11/18 2025    OXYGEN THERAPY     POSITIONING      SpO2  94 % 08/13/18 0802   Body Position  independently positioning 08/13/18 0934    O2 Device  None (Room air) 08/13/18 0802   Head of Bed (HOB)  HOB at 20 degrees 08/13/18 0032    PAIN/COMFORT     Chair  Shifted weight 08/11/18 2025    Patient Currently in Pain  denies 08/13/18 0802   Positioning/Transfer Devices  pillows;in use 08/11/18 2025    Preferred Pain Scale  number (Numeric Rating Pain Scale) 08/13/18 0028   DAILY CARE      Patient's Stated Pain Goal  No pain 08/13/18 0028   Activity Management  activity adjusted per tolerance 08/13/18 0934    0-10 Pain Scale  0 08/13/18 0028   Activity Assistance Provided  assistance, 1 person 08/13/18 0934    HEIGHT AND WEIGHT     Assistive Device Utilized  cane 08/13/18 0934            Patient Lines/Drains/Airways Status    Active LINES/DRAINS/AIRWAYS     Name: Placement date: Placement time: Site: Days: Last dressing change:    Peripheral IV 08/12/18 Right Lower forearm 08/12/18   0625   Lower forearm   1             Patient " Lines/Drains/Airways Status    Active PICC/CVC     None            Intake/Output Detail Report     Date Intake     Output    Shift P.O. I.V. IV Piggyback Total Total       Noc 08/11/18 2300 - 08/12/18 0659 -- 555 -- 555 -- 555    Day 08/12/18 0700 - 08/12/18 1459 300 -- -- 300 -- 300    [place] 08/12/18 1500 - 08/12/18 2259 -- -- -- -- -- 0    Noc 08/12/18 2300 - 08/13/18 0659 -- -- -- -- -- 0    Day 08/13/18 0700 - 08/13/18 1459 480 -- -- 480 -- 480      Last Void/BM       Most Recent Value    Urine Occurrence 1 at 08/13/2018 0226    Stool Occurrence 1 at 08/12/2018 1030      Case Management/Discharge Planning     Case Management/Discharge Planning Flowsheet     LIVING ENVIRONMENT     FINAL RESOURCES      Lives With  alone 08/11/18 2129   Resources List  Other (Comments) ([facility] Adult Protection) 03/07/16 1131    Living Arrangements  house 01/02/15 1559   Existing Resources/Services  Home Care 01/29/15 1526    COPING/STRESS     ABUSE RISK SCREEN      Major Change/Loss/Stressor  unable to assess 08/11/18 2031   QUESTION TO PATIENT:  Has a member of your family or a partner(now or in the past) intimidated, hurt, manipulated, or controlled you in any way?  no 08/11/18 1412    EXPECTED DISCHARGE     QUESTION TO PATIENT: Do you feel safe going back to the place where you are living?  yes 08/11/18 1412    Expected Discharge Date  08/14/18 ([facility], ) 08/12/18 0757   OBSERVATION: Is there reason to believe there has been maltreatment of a vulnerable adult (ie. Physical/Sexual/Emotional abuse, self neglect, lack of adequate food, shelter, medical care, or financial exploitation)?  no 08/11/18 1412    ASSESSMENT/CONCERNS TO BE ADDRESSED     OTHER      Concerns To Be Addressed  other (see comments) 03/07/16 1132   Are you depressed or being treated for depression?  No 08/11/18 2136    Concerns Comments  Transferring care outside of [place]. Adult protection is involved. 03/07/16 1132   HOMICIDE RISK       DISCHARGE PLANNING     Feels Like Hurting Others  no 08/11/18 1412    Equipment Used at Home  cane, straight;walker, rolling (transfer belt) 03/07/16 1135

## 2018-08-11 NOTE — Clinical Note
Admitting Physician: ERLINDA OCONNELL [[id number]]   [id number]   Bed Type: Adult ICU [6]   Special needs: Fall Risk [8]   Special needs: Confused [4]   Bed request comments: Near desk/bed rqst @ 0886

## 2018-08-11 NOTE — H&P
[facility]    History and Physical  Hospitalist       Date of Admission:  8/11/2018  Date of Service (when I saw the patient): 08/11/18    Assessment & Plan   Jean M Topitzhofer is a 84 year old female with a history of dementia and hypertension who presents with weakness, fatigue, vomiting for the last 2 days.  Workup has revealed probable urinary tract infection with pyuria and dehydration.  Patient with initial soft blood pressures and also sinus bradycardia.    Summary:    1.  Urinary tract infection:    IV fluids    Rocephin 1 g every 24 hours    Await urine culture sensitivities    2.  Sinus bradycardia:  Etiology is less clear.  Reviewing her medication list there is no oral beta blocker or calcium channel blocker.  She is on eyedrops containing timolol which could theoretically cause bradycardia.  When speaking to her and causing some stimulation her heart rate does increase.  It is unclear how much of the bradycardia is could she reading to her symptoms versus the UTI.    continue eyedrops for now    Monitor on telemetry    Have atropine ready for symptomatic bradycardia    Consider cardiology consultation if persists or continues to be symptomatic.    If continuing symptoms consider changing timolol    3.  Dementia: Likely Alzheimer's type    We'll continue to monitor    Avoid medications that will aggravate her dementia and behaviors    4.  Hypertension:      Continue Dyazide    Follow-up basic metabolic panel in a.m.    5.  Glaucoma: Continue eyedrops for now    DVT Prophylaxis: Pneumatic Compression Devices  Code Status: Full Code,  does have a NANCY ST which states full code status.  Did discuss with niece.    Disposition Plan   Expected discharge in 2-3 days to prior living arrangement once urinary tract infection and weakness have improved, stable bradycardia.     Entered: Joselo Llanes 08/11/2018, 4:36 PM       Joselo Llanes MD    Primary Care Physician   [facility]    Chief Complaint   Vomiting and fatigue    History is obtained from the patient, electronic health record, emergency department physician and patient's daughter.  Patient with dementia so history is unreliable.      History of Present Illness   Jean M Topitzhofer is a 84 year old female with dementia who presents with 2 days of fatigue and vomiting.  Per ED physician and niece patient lives in assisted living and is been noted to be more fatigued and had an episode of vomiting earlier today.  Blood pressure was checked and was noted to be 95/60 at the facility.  Patient has had some complaints over the last 2 days of this vomiting, fatigue, no nausea and some lightheadedness and weakness.  There has been a change in appetite over the course of last day and now the patient is unable to stand or ambulate on her own without assistance.  She has had no new medications.  There's been no diarrhea abdominal pain shortness of breath dysuria or falls.    She currently says she does not feel well and is very tired and vomited earlier today.    Past Medical History    Moderate dementia with behavioral disturbance  Seasonal allergic rhinitis  Glaucoma  Essential hypertension  Hearing loss    Past Surgical History   No past surgical history    Prior to Admission Medications   Prior to Admission Medications   Prescriptions Last Dose Informant Patient Reported? Taking?   ADACEL 5-2-15.5 LF-MCG/0.5 injection   Yes No   DOXYCYCLINE HYCLATE PO   Yes No   Sig: Take 100 mg by mouth daily   Emollient (CERAVE EX)   Yes Yes   Eyelid Cleansers (AVENOVA EX)   Yes Yes   FLUZONE HIGH-DOSE 0.5 ML injection   Yes No   FLUZONE HIGH-DOSE injection   Yes No   Multiple Vitamins-Minerals (CENTRUM) TABS   No Yes   Sig: Take 1 tablet by mouth daily   acetaminophen (TYLENOL) 325 MG tablet   No Yes   Sig: Take 2 tablets (650 mg) by mouth every 6 hours as needed for mild pain   aspirin 81 MG tablet   No No   Sig: Take 1 tablet (81 mg) by mouth  daily   dorzolamide-timolol (COSOPT) 2-0.5 % ophthalmic solution   Yes Yes   fish oil-omega-3 fatty acids 1000 MG capsule   Yes Yes   Sig: Take 2 g by mouth daily   latanoprost (XALATAN) 0.005 % ophthalmic solution   Yes Yes   metoprolol (LOPRESSOR) 50 MG tablet   No No   Sig: TAKE 1 TABLET BY MOUTH TWICE DAILY   mupirocin (BACTROBAN) 2 % ointment   No No   Sig: Apply topically 3 times daily   timolol (TIMOPTIC) 0.25 % ophthalmic solution   No No   Sig: Place 1 drop into the right eye 2 times daily   triamterene-hydrochlorothiazide (DYAZIDE) 37.5-25 MG per capsule   No Yes   Sig: Take 1 capsule by mouth daily      Facility-Administered Medications: None     Allergies   No Known Allergies    Social History   I have reviewed this patient's social history and updated it with pertinent information if needed. Jean M Topitzhofer  reports that she has never smoked. She has never used smokeless tobacco. She reports that she does not drink alcohol or use illicit drugs.    Family History   I have reviewed this patient's family history and updated it with pertinent information if needed.   Family History   Problem Relation Age of Onset     Diabetes Maternal Grandmother      Hypertension Mother      Aneurysm Son        Review of Systems   The 10 point Review of Systems is negative other than noted in the HPI or here.     Physical Exam   Temp: 97.4  F (36.3  C) Temp src: Oral BP: 132/69 Pulse: (!) 44 Heart Rate: 46 Resp: 16 SpO2: 94 %      Vital Signs with Ranges  Temp:  [97.4  F (36.3  C)] 97.4  F (36.3  C)  Pulse:  [44] 44  Heart Rate:  [39-46] 46  Resp:  [16] 16  BP: (113-143)/(60-89) 132/69  SpO2:  [93 %-96 %] 94 %  108 lbs 0 oz    Constitutional: Awake, alert, cooperative, no apparent distress  HEENT:  Tacky to Moist mucous membranes, , Conjunctiva and pupils normal.  No thyroid abnormality  Respiratory: Clear to auscultation bilaterally, no crackles or wheezing.  Cardiovascular: Bradycardia with rate of 40-45, normal S1  and S2, and no murmur noted.  GI: Soft, non-distended, non-tender, normal bowel sounds, no hepatosplenomegaly   Lymph/Hematologic: No anterior cervical  adenopathy.  Skin: No rashes, no cyanosis, no edema.  Musculoskeletal: No joint swelling, erythema or tenderness.  Neurologic: Alert, oriented to person, did not test gait or balance.  Psychiatric:  no obvious anxiety or depression. Normal affect    Data   Data reviewed today:  I personally reviewed the EKG tracing showing Sinus bradycardia 45 bpm.    Recent Labs  Lab 08/11/18  1611 08/11/18  1500 08/11/18  1428   WBC  --   --  8.5   HGB  --   --  15.0   MCV  --   --  96   PLT  --   --  264   NA  --   --  137   POTASSIUM 4.5  --  5.2   CHLORIDE  --   --  102   CO2  --   --  30   BUN  --   --  18   CR  --   --  0.73   ANIONGAP  --   --  5   TOBY  --   --  9.2   GLC  --   --  100*   ALBUMIN  --   --  3.0*   PROTTOTAL  --   --  7.2   BILITOTAL  --   --  0.8   ALKPHOS  --   --  88   ALT  --   --  40   AST  --   --  80*   TROPONIN  --  0.00  --        Imaging:  No results found for this or any previous visit (from the past 24 hour(s)).

## 2018-08-11 NOTE — ED PROVIDER NOTES
History     Chief Complaint:  Vomiting and hypotension     HPI   Jean M Topitzhofer is a 84 year old female with a history of dementia who presents to the emergency department today for evaluation of emesis and fatigue. The patient lives in an assisted living home at the memory care unit and is in the ED today with her niece for evaluation of her recent fatigue after an episode of emesis earlier today coupled with a blood pressure of 95/60. Since two days ago, the patient reports emesis, fatigue, nausea, light-headedness, and weakness. As of yesterday, the patient endorses change in appetite, and change in activity which includes the inability to stand up or ambulate. The patient has taken her medicine today but could not get up and did not eat anything until lunch time and promptly experienced emesis afterwards. The patient denies syncope, diarrhea, abdominal pain, change in frequency, dysuria, changes to her current medications, injuries, or a history of thyroid issues.     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  Smoking Status: Never Smoker  Smokeless Tobacco: Never Used  Alcohol Use: Negative  Marital Status:     Review of Systems   Constitutional: Positive for activity change, appetite change and fatigue.   Gastrointestinal: Positive for nausea and vomiting. Negative for diarrhea.   Genitourinary: Negative for dysuria and frequency.   Neurological: Positive for weakness and light-headedness. Negative for syncope.   All other systems reviewed and are  "negative.      Physical Exam     Patient Vitals for the past 24 hrs:   BP Temp Temp src Pulse Heart Rate Resp SpO2 Height Weight   08/11/18 1545 132/69 - - - 46 - 94 % - -   08/11/18 1530 130/64 - - - 45 - 95 % - -   08/11/18 1515 119/60 - - - 39 - 93 % - -   08/11/18 1500 122/65 - - - 42 - 96 % - -   08/11/18 1445 126/89 - - - 45 - 95 % - -   08/11/18 1430 113/72 - - - 44 - 96 % - -   08/11/18 1415 128/77 - - - 44 - 96 % - -   08/11/18 1407 143/73 97.4  F (36.3  C) Oral (!) 44 - 16 96 % 1.575 m (5' 2\") 49 kg (108 lb)     Physical Exam    Vital signs and nursing notes reviewed.     Constitutional: laying on gurney appears fatigued  HENT: Oropharynx is clear and moderately dry  Eyes: Conjunctivae are normal bilaterally. Pupils equal  Neck: normal range of motion  Cardiovascular: bradycardic rate, regular rhythm, normal heart sounds.   Pulmonary/Chest: Effort normal and breath sounds normal. No respiratory distress.   Abdominal: Soft. Bowel sounds are normal. No tenderness to palpation. No rebound or guarding.   Musculoskeletal: No joint swelling or edema.   Neurological: Alert fatigued.  No focal weakness.  Mild dementia  Skin: Skin is warm and dry. No rash noted.   Psych: normal affect    Emergency Department Course     ECG:  ECG taken at 1410, ECG read at 1420  Sinus bradycardia  Left axis deviation  Right bundle branch block  Septal infarct, age undetermined  Abnormal ECG  Rate 45 bpm. NC interval 164 ms. QRS duration 130 ms. QT/QTc 522/451 ms. P-R-T axes 34 -38 -16.    Laboratory:  Laboratory findings were communicated with the patient who voiced understanding of the findings.    Potassium: 4.5 (repeat)  Magnesium: 1.7  Lactic Acid (Resulted: 1510): 0.9, PCO2 54 (H), PO2 22 (L), Bicarbonate 31 (H) o/w WNL  Troponin (Collected 1500): 0.00  UA with Microscopic: Nitrate Positive (A), Leukocyte Esterase Moderate (A), WBC/HPF 84 (H), Bacteria Many (A) o/w WNL  Urine Culture Aerobic Bacterial: Pending   CBC: WBC 8.5, " HGB 15.0,   BMP: Glucose 100 (H) o/w WNL (Creatinine 0.73)  Hepatic panel: L Albumin 3.0 (L), AST 80 (H), o/w WNL    Interventions:  1447  mL IV  1538 Rocephin 1 g IV  1539 Zofran 2 mg PO     Emergency Department Course:    1407 Nursing notes and vitals reviewed.    1409 I performed an exam of the patient as documented above.     1410 EKG obtained.     1428 IV was inserted and blood was drawn for laboratory testing, results above.    1445 The patient provided a urine sample here in the emergency department. This was sent for laboratory testing, findings above.    1500 Troponin levels were collected.     1505:  Lactic acid levels were collected.     1517 Recheck and update.    1539 I spoke with Dr. Garcia of the Hospilist service from [facility] regarding patient's presentation, findings, and plan of care.    1633 I personally reviewed the laboratory results with the patient and answered all related questions prior to admission.    Impression & Plan      Medical Decision Making:  Jean M Topitzhofer is a 84 year old female who presents to the emergency department today for evaluation of general fatigue and nausea.  On presentation patient was noted to have a sinus brachycardiac in the mid 40s consistently. She had no hypotension or chest pain complaints. Initial lab test did not show any suggestion of any electrolyte abnormality and she is not on any beta blocker medications other than eye drops with review of her Medication list.  EKG shows no signs of ischemic changes. Her urinalysis does show findings consistent with urinary tract infection. She's given IV fluids and started on rocephin. During her emergency department stay she had no hypotension, she had occasional heart rates in the upper 30s but was most consistently is a sinus bradycardia of 45 bpm. It is unclear the illness is exacerbating baseline bradycardia or this is a suggestion she may need cardiac intervention if this does not resolve  after treating her underlying illness. I discussed this case with Dr. Garcia and she will be admitted into a telemetry bed.     Diagnosis:    ICD-10-CM   1. Urinary tract infection without hematuria, site unspecified N39.0       2. Sinus bradycardia R00.1   3. Weakness generalized R53.1     Disposition:   The patient is admitted into the care of Dr. Garcia.    Scribe Disclosure:  I, Jason Lee, am serving as a scribe at 2:12 PM on 8/11/2018 to document services personally performed by Paolo Elizondo MD based on my observations and the provider's statements to me.      [facility] EMERGENCY DEPARTMENT       Paolo Elizondo MD  08/11/18 1984

## 2018-08-11 NOTE — PHARMACY-ADMISSION MEDICATION HISTORY
Admission medication history interview status for this patient is complete. See [facility] admission navigator for allergy information, prior to admission medications and immunization status.     Medication history interview source(s):Patient and Family  Medication history resources (including written lists, pill bottles, clinic record):[facility] Med list  Primary pharmacy:-    Changes made to PTA medication list:  Added: k-dur, antacid, robitussin  Deleted: bactroban, metoprolol, fluzone, doxy, aspirin, adacel  Changed: alphagan to tid,     Actions taken by pharmacist (provider contacted, etc):None     Additional medication history information:None    Medication reconciliation/reorder completed by provider prior to medication history? No    Do you take OTC medications (eg tylenol, ibuprofen, fish oil, eye/ear drops, etc)? yes(Y/N)    For patients on insulin therapy: no (Y/N)  Lantus/levemir/NPH/Mix 70/30 dose:   (Y/N) (see Med list for doses)   Sliding scale Novolog Y/N  If Yes, do you have a baseline novolog pre-meal dose:  units with meals  Patients eat three meals a day:   Y/N    How many episodes of hypoglycemia do you have per week: _______  How many missed doses do you have per week: ______  How many times do you check your blood glucose per day: _______   Any Barriers to therapy - Be specific :  cost of medications, comfortable with giving injections (if applicable), comfortable and confident with current diabetes regimen: Y/N ______________      Prior to Admission medications    Medication Sig Last Dose Taking? Auth Provider   acetaminophen (TYLENOL) 500 MG tablet Take 1,000 mg by mouth 3 times daily as needed for mild pain  Yes Unknown, Entered By History   brimonidine (ALPHAGAN-P) 0.15 % ophthalmic solution Place 1 drop into the right eye 3 times daily 8/11/2018 at x1 Yes Unknown, Entered By History   calcium carbonate (ANTACID EXTRA STRENGTH) 750 MG CHEW Take 1,500 mg by mouth daily as needed  Yes Unknown, Entered By  History   cycloSPORINE (RESTASIS) 0.05 % ophthalmic emulsion Place 1 drop into both eyes 2 times daily 8/11/2018 at x1 Yes Unknown, Entered By History   dorzolamide-timolol (COSOPT) 2-0.5 % ophthalmic solution Place 1 drop into the right eye every morning  8/11/2018 at Unknown time Yes Reported, Patient   Emollient (CERAVE) CREA Externally apply topically daily To boy legs 8/11/2018 at Unknown time Yes Unknown, Entered By History   Eyelid Cleansers (AVENOVA EX) Externally apply 1 spray topically daily Spray to closed eyes covering eye lids 8/11/2018 at Unknown time Yes Reported, Patient   fish oil-omega-3 fatty acids 1000 MG capsule Take 2 g by mouth daily 8/11/2018 at Unknown time Yes Reported, Patient   guaiFENesin (ROBITUSSIN) 100 MG/5ML SYRP Take 10 mLs by mouth every 4 hours as needed for cough  Yes Unknown, Entered By History   latanoprost (XALATAN) 0.005 % ophthalmic solution Place 1 drop into both eyes At Bedtime  8/10/2018 at Unknown time Yes Reported, Patient   Multiple Vitamins-Minerals (CENTRUM) TABS Take 1 tablet by mouth daily 8/11/2018 at Unknown time Yes Ilene Petersen MD   potassium chloride SA (K-DUR/KLOR-CON M) 10 MEQ CR tablet Take 10 mEq by mouth daily 8/11/2018 at Unknown time Yes Unknown, Entered By History   triamterene-hydrochlorothiazide (DYAZIDE) 37.5-25 MG per capsule Take 1 capsule by mouth daily 8/11/2018 at Unknown time Yes Renetta Ellis MD

## 2018-08-12 LAB
ANION GAP SERPL CALCULATED.3IONS-SCNC: 8 MMOL/L (ref 3–14)
ANION GAP SERPL CALCULATED.3IONS-SCNC: NORMAL MMOL/L (ref 3–14)
BUN SERPL-MCNC: 16 MG/DL (ref 7–30)
BUN SERPL-MCNC: NORMAL MG/DL (ref 7–30)
CALCIUM SERPL-MCNC: 8.2 MG/DL (ref 8.5–10.1)
CALCIUM SERPL-MCNC: NORMAL MG/DL (ref 8.5–10.1)
CHLORIDE SERPL-SCNC: 107 MMOL/L (ref 94–109)
CHLORIDE SERPL-SCNC: NORMAL MMOL/L (ref 94–109)
CO2 SERPL-SCNC: 26 MMOL/L (ref 20–32)
CO2 SERPL-SCNC: NORMAL MMOL/L (ref 20–32)
CREAT SERPL-MCNC: 0.76 MG/DL (ref 0.52–1.04)
CREAT SERPL-MCNC: NORMAL MG/DL (ref 0.52–1.04)
GFR SERPL CREATININE-BSD FRML MDRD: 72 ML/MIN/1.7M2
GFR SERPL CREATININE-BSD FRML MDRD: NORMAL ML/MIN/1.7M2
GLUCOSE SERPL-MCNC: 79 MG/DL (ref 70–99)
GLUCOSE SERPL-MCNC: NORMAL MG/DL (ref 70–99)
POTASSIUM SERPL-SCNC: 3.7 MMOL/L (ref 3.4–5.3)
POTASSIUM SERPL-SCNC: NORMAL MMOL/L (ref 3.4–5.3)
SODIUM SERPL-SCNC: 141 MMOL/L (ref 133–144)
SODIUM SERPL-SCNC: NORMAL MMOL/L (ref 133–144)

## 2018-08-12 PROCEDURE — 25000132 ZZH RX MED GY IP 250 OP 250 PS 637: Mod: GY | Performed by: INTERNAL MEDICINE

## 2018-08-12 PROCEDURE — A9270 NON-COVERED ITEM OR SERVICE: HCPCS | Mod: GY | Performed by: INTERNAL MEDICINE

## 2018-08-12 PROCEDURE — 99232 SBSQ HOSP IP/OBS MODERATE 35: CPT | Performed by: INTERNAL MEDICINE

## 2018-08-12 PROCEDURE — 80048 BASIC METABOLIC PNL TOTAL CA: CPT | Performed by: INTERNAL MEDICINE

## 2018-08-12 PROCEDURE — 25000128 H RX IP 250 OP 636: Performed by: INTERNAL MEDICINE

## 2018-08-12 PROCEDURE — 12000007 ZZH R&B INTERMEDIATE

## 2018-08-12 PROCEDURE — 36415 COLL VENOUS BLD VENIPUNCTURE: CPT | Performed by: INTERNAL MEDICINE

## 2018-08-12 RX ADMIN — DORZOLAMIDE HYDROCHLORIDE AND TIMOLOL MALEATE 1 DROP: 20; 5 SOLUTION/ DROPS OPHTHALMIC at 09:14

## 2018-08-12 RX ADMIN — MULTIPLE VITAMINS W/ MINERALS TAB 1 TABLET: TAB at 09:14

## 2018-08-12 RX ADMIN — BRIMONIDINE TARTRATE 1 DROP: 2 SOLUTION/ DROPS OPHTHALMIC at 15:27

## 2018-08-12 RX ADMIN — CEFTRIAXONE SODIUM 1 G: 1 INJECTION, POWDER, FOR SOLUTION INTRAMUSCULAR; INTRAVENOUS at 14:23

## 2018-08-12 RX ADMIN — LATANOPROST 1 DROP: 50 SOLUTION/ DROPS OPHTHALMIC at 21:20

## 2018-08-12 RX ADMIN — DEXTRAN 70 AND HYPROMELLOSE 2910 1 DROP: 1; 3 SOLUTION/ DROPS OPHTHALMIC at 21:34

## 2018-08-12 RX ADMIN — BRIMONIDINE TARTRATE 1 DROP: 2 SOLUTION/ DROPS OPHTHALMIC at 09:14

## 2018-08-12 RX ADMIN — BRIMONIDINE TARTRATE 1 DROP: 2 SOLUTION/ DROPS OPHTHALMIC at 21:27

## 2018-08-12 RX ADMIN — TRIAMTERENE AND HYDROCHLOROTHIAZIDE 1 TABLET: 37.5; 25 TABLET ORAL at 09:14

## 2018-08-12 RX ADMIN — DEXTRAN 70 AND HYPROMELLOSE 2910 1 DROP: 1; 3 SOLUTION/ DROPS OPHTHALMIC at 09:14

## 2018-08-12 RX ADMIN — SODIUM CHLORIDE: 9 INJECTION, SOLUTION INTRAVENOUS at 03:13

## 2018-08-12 RX ADMIN — POTASSIUM CHLORIDE 10 MEQ: 750 TABLET, FILM COATED, EXTENDED RELEASE ORAL at 09:14

## 2018-08-12 ASSESSMENT — ACTIVITIES OF DAILY LIVING (ADL)
ADLS_ACUITY_SCORE: 22
ADLS_ACUITY_SCORE: 20
ADLS_ACUITY_SCORE: 20
ADLS_ACUITY_SCORE: 22
ADLS_ACUITY_SCORE: 20
ADLS_ACUITY_SCORE: 22

## 2018-08-12 NOTE — PLAN OF CARE
Problem: Patient Care Overview  Goal: Plan of Care/Patient Progress Review  Outcome: No Change  [id number]    Vitals: VSS, afebrile  Neuro: Alert to self, disoriented to specific place, time/date  Lungs: LS diminished, room air, no cough  Cardiac: HR bradycardic, Tele: SR/SB w/ BBB  IV: NS infusing at 100/hr  Labs:  BMP in am  GI: BS active, no bm overnight, had 1 episode of emesis at 2000, IV zofran given, resolved.  : voiding frequently  Diet: Regular  Pain: denies  Activity: assist 1  Plan: IV rocephin for UTI. Monitor orientation, plan to d.c home to memory care in 2-3 days.

## 2018-08-12 NOTE — PLAN OF CARE
"Problem: Urinary Tract Infection (Adult)  Goal: Signs and Symptoms of Listed Potential Problems Will be Absent, Minimized or Managed (Urinary Tract Infection)  Signs and symptoms of listed potential problems will be absent, minimized or managed by discharge/transition of care (reference Urinary Tract Infection (Adult) CPG).   Outcome: Improving  VSS on RA, afebrile. A/O self and place, forgetful. Regular diet. Pt c/o feeling \"queasy\" when meals arrive, ginger ale given. Tele SB, BBB. Pt likes heating pad to eyes for calming, Aqua K pump at bedside. Assist x1. Plan for discharge 1-2 days.       "

## 2018-08-12 NOTE — PROGRESS NOTES
[facility]  Hospitalist Progress Note    Isidro Mcfarlane MD 08/12/2018  Text Page      Reason for Stay (Diagnosis): UTI         Assessment and Plan:      Summary of Stay:  Jean M Topitzhofer is a 84 year old female with a history of dementia and hypertension who presents with weakness, fatigue, vomiting for the last 2 days.  Workup has revealed probable urinary tract infection with pyuria and dehydration.  Patient with initial soft blood pressures and also sinus bradycardia.     Summary:     1.  Urinary tract infection:     -Symptoms improving, no longer vomiting and is tolerating oral intake.    Afebrile with normal white blood cell count    Continue Rocephin 1 g every 24 hours (initiated on 8/11)    Urine culture positive for E. coli sensitivities pending    Assess mental status, strength and ability to take oral intake today.     2.  Sinus bradycardia:  Etiology is less clear.  Reviewing her medication list there is no oral beta blocker or calcium channel blocker.  She is on eyedrops containing timolol which could theoretically cause bradycardia.  When speaking to her and causing some stimulation her heart rate does increase.  It is unclear how much of the bradycardia is could she reading to her symptoms versus the UTI.    continue eyedrops for now    Monitor on telemetry-heart rate is increased some she does not clearly have symptoms, rate is in the 40s-50s    Have atropine ready for symptomatic bradycardia    Consider cardiology consultation if persists or continues to be symptomatic.    If continuing symptoms consider changing timolol     3.  Dementia: Likely Alzheimer's type    We'll continue to monitor    Avoid medications that will aggravate her dementia      4.  Hypertension:      Continue Dyazide       5.  Glaucoma: Continue eyedrops for now     DVT Prophylaxis: Pneumatic Compression Devices  Code Status: Full Code,  POLST states full code status.  Did discuss with niece.  Disposition  "Plan   Expected discharge in 1-2 days to prior living arrangement once functional status back to baseline.     Entered: Isidro Mcfarlane 08/12/2018, 12:15 PM     Incidental Findings/ Discharge Issues: None            Interval History (Subjective):      Just waking up.  Feels ok.  Mildly confused.                  Physical Exam:      Last Vital Signs:  BP 98/49  Pulse (!) 44  Temp 96.1  F (35.6  C) (Oral)  Resp 14  Ht 1.575 m (5' 2\")  Wt 49 kg (108 lb)  LMP  (LMP Unknown)  SpO2 94%  BMI 19.75 kg/m2 afebrile      Vital signs reviewed  General:  Alert, calm, NAD  CV: regular rate and rhythm, no murmurs or rubs  Lungs:  Clear to ascultation bilaterally, normal respiratory effort  Abdomen:  Soft, nontender, nondistended, no masses, normal bowel sounds  Extremities:  No edema  Neuro: normal strength and sensation in all 4 extremities, cranial nerves grossly intact  Psychiatric:  Calm, confused             Medications:      All current medications were reviewed with changes reflected in problem list.         Data:      All new lab and imaging data was reviewed.   Labs:  Creatinine 0.76  "

## 2018-08-12 NOTE — PLAN OF CARE
Problem: Patient Care Overview  Goal: Plan of Care/Patient Progress Review  Outcome: No Change  Tele: SB/SR HR 39-60, BBB. Up with assist x1. Nausea with movement. IVF infusing. UA positive-UC pending. K+ 4.5. Dementia.

## 2018-08-13 VITALS
HEIGHT: 62 IN | DIASTOLIC BLOOD PRESSURE: 67 MMHG | BODY MASS INDEX: 19.88 KG/M2 | WEIGHT: 108 LBS | TEMPERATURE: 98 F | HEART RATE: 44 BPM | RESPIRATION RATE: 16 BRPM | SYSTOLIC BLOOD PRESSURE: 165 MMHG | OXYGEN SATURATION: 94 %

## 2018-08-13 LAB
BACTERIA SPEC CULT: ABNORMAL
INTERPRETATION ECG - MUSE: NORMAL
Lab: ABNORMAL
SPECIMEN SOURCE: ABNORMAL

## 2018-08-13 PROCEDURE — 25000132 ZZH RX MED GY IP 250 OP 250 PS 637: Mod: GY | Performed by: INTERNAL MEDICINE

## 2018-08-13 PROCEDURE — 99238 HOSP IP/OBS DSCHRG MGMT 30/<: CPT | Performed by: INTERNAL MEDICINE

## 2018-08-13 PROCEDURE — A9270 NON-COVERED ITEM OR SERVICE: HCPCS | Mod: GY | Performed by: INTERNAL MEDICINE

## 2018-08-13 RX ORDER — SULFAMETHOXAZOLE/TRIMETHOPRIM 800-160 MG
1 TABLET ORAL 2 TIMES DAILY
Qty: 6 TABLET | Refills: 0 | Status: SHIPPED | OUTPATIENT
Start: 2018-08-13 | End: 2018-08-16

## 2018-08-13 RX ADMIN — TRIAMTERENE AND HYDROCHLOROTHIAZIDE 1 TABLET: 37.5; 25 TABLET ORAL at 09:26

## 2018-08-13 RX ADMIN — MULTIPLE VITAMINS W/ MINERALS TAB 1 TABLET: TAB at 09:26

## 2018-08-13 RX ADMIN — DEXTRAN 70 AND HYPROMELLOSE 2910 1 DROP: 1; 3 SOLUTION/ DROPS OPHTHALMIC at 09:21

## 2018-08-13 RX ADMIN — BRIMONIDINE TARTRATE 1 DROP: 2 SOLUTION/ DROPS OPHTHALMIC at 10:09

## 2018-08-13 RX ADMIN — POTASSIUM CHLORIDE 10 MEQ: 750 TABLET, FILM COATED, EXTENDED RELEASE ORAL at 09:26

## 2018-08-13 RX ADMIN — DORZOLAMIDE HYDROCHLORIDE AND TIMOLOL MALEATE 1 DROP: 20; 5 SOLUTION/ DROPS OPHTHALMIC at 09:28

## 2018-08-13 RX ADMIN — Medication: at 10:10

## 2018-08-13 ASSESSMENT — ACTIVITIES OF DAILY LIVING (ADL)
ADLS_ACUITY_SCORE: 20

## 2018-08-13 NOTE — PLAN OF CARE
Problem: Urinary Tract Infection (Adult)  Goal: Signs and Symptoms of Listed Potential Problems Will be Absent, Minimized or Managed (Urinary Tract Infection)  Signs and symptoms of listed potential problems will be absent, minimized or managed by discharge/transition of care (reference Urinary Tract Infection (Adult) CPG).   Outcome: Adequate for Discharge Date Met: 08/13/18  VSS on RA, afebrile. Pt to discharge to current assisted living facility via niece. Pt send with discharge medications and personal supply of Avenova from lock box in the pt room. Verbalized understanding of discharge instructions. Packet sent with niece for the care facility.

## 2018-08-13 NOTE — PLAN OF CARE
Problem: Patient Care Overview  Goal: Plan of Care/Patient Progress Review  Outcome: No Change  A&OX2 disoriented on situation and time, HR in the 50's, /78, on room air. Denies nausea.Tele- SB. On regular diet, up with assist of one. On IV rocephin. Bed alarm on for safety.

## 2018-08-13 NOTE — PLAN OF CARE
Problem: Patient Care Overview  Goal: Plan of Care/Patient Progress Review  Outcome: Improving  A&O x2, VSS, up with A-1, falls precaution in place, impulsive, Tele SR, continues on Rocephin IV dx UTI, plan to discharge to prior living arrangement, will continue with POC.

## 2018-08-14 NOTE — DISCHARGE SUMMARY
[facility]    Discharge Summary  Hospitalist    Date of Admission:  8/11/2018  Date of Discharge:  8/13/2018 10:50 AM  Discharging Provider: Isidro Mcfarlane MD    Discharge Diagnoses   UTI  Generalized weakness  Dementia  Hypertension      History of Present Illness   Jean M Topitzhofer is an 84 year old female who presented with vomiting.    Hospital Course   Jean M Topitzhofer was admitted on 8/11/2018.   She is a 84 year old female with a history of dementia and hypertension who presented with weakness, fatigue, vomiting for 2 days.  Workup has revealed probable urinary tract infection with pyuria and dehydration.  Patient with initial soft blood pressures and also sinus bradycardia.      Summary:      1.  Urinary tract infection:                                                 -She was treated with Rocephin (initiated on 8/11) and symptoms resolved.      Vomiting resolved and was tolerating oral intake.    Afebrile with normal white blood cell count    Urine culture positive for E. Coli.  Will complete a course of bactrim..      2.  Sinus bradycardia:      On telemetry-heart rate was in 40-50s and she was asymptomatic.  Follow up in future if becomes symptomatic.          3.  Dementia: Likely Alzheimer's type.  Stable.         4.  Hypertension:      Continue Dyazide         5.  Glaucoma: Continue eyedrops. Consider stopping timolol if bradycardia becomes a concern.    Isidro Mcfarlane MD    Significant Results and Procedures   Urine culture + for E.coli        Code Status   Full Code (verified by niece- previous records stated DNR)       Primary Care Physician   [facility]    Physical Exam                     Vitals:    08/11/18 1407   Weight: 49 kg (108 lb)     Vital Signs with Ranges     I/O last 3 completed shifts:  In: 915 [P.O.:360; I.V.:555]  Out: -     Discharge Disposition   Discharged to assisted living  Condition at discharge: Stable    Consultations This  Hospital Stay   None    Time Spent on this Encounter   I discussed discharge with Jean M Topitzhofer.    Discharge Orders     Reason for your hospital stay   UTI with weakness     Follow-up and recommended labs and tests    With primary care provider within 1 week     Activity   Your activity upon discharge: activity as tolerated     Full Code     Diet   Follow this diet upon discharge: Regular       Discharge Medications   Discharge Medication List as of 8/13/2018 10:28 AM      START taking these medications    Details   sulfamethoxazole-trimethoprim (BACTRIM DS/SEPTRA DS) 800-160 MG per tablet Take 1 tablet by mouth 2 times daily for 3 days, Disp-6 tablet, R-0, E-Prescribe         CONTINUE these medications which have NOT CHANGED    Details   acetaminophen (TYLENOL) 500 MG tablet Take 1,000 mg by mouth 3 times daily as needed for mild pain, Historical      brimonidine (ALPHAGAN-P) 0.15 % ophthalmic solution Place 1 drop into the right eye 3 times daily, Historical      calcium carbonate (ANTACID EXTRA STRENGTH) 750 MG CHEW Take 1,500 mg by mouth daily as needed, Historical      cycloSPORINE (RESTASIS) 0.05 % ophthalmic emulsion Place 1 drop into both eyes 2 times daily, Historical      dorzolamide-timolol (COSOPT) 2-0.5 % ophthalmic solution Place 1 drop into the right eye every morning , Historical      Emollient (CERAVE) CREA Externally apply topically daily To boy legsHistorical      Eyelid Cleansers (AVENOVA EX) Externally apply 1 spray topically daily Spray to closed eyes covering eye lids, Historical      fish oil-omega-3 fatty acids 1000 MG capsule Take 2 g by mouth daily, Historical      guaiFENesin (ROBITUSSIN) 100 MG/5ML SYRP Take 10 mLs by mouth every 4 hours as needed for cough, Historical      latanoprost (XALATAN) 0.005 % ophthalmic solution Place 1 drop into both eyes At Bedtime , R-4, Historical      Multiple Vitamins-Minerals (CENTRUM) TABS Take 1 tablet by mouth daily, Disp-30 tablet, R-0,  Transitional      potassium chloride SA (K-DUR/KLOR-CON M) 10 MEQ CR tablet Take 10 mEq by mouth daily, Historical      triamterene-hydrochlorothiazide (DYAZIDE) 37.5-25 MG per capsule Take 1 capsule by mouth daily, Disp-30 capsule, R-5, E-Prescribe           Allergies   No Known Allergies  Data   Urine culture:  Culture & Susceptibility      ESCHERICHIA COLI      Antibiotic Interpretation Sensitivity Unit Method Status     AMPICILLIN Sensitive 8.0 ug/mL MIKAYLA Final     AMPICILLIN/SULBACTAM Sensitive 8 ug/mL MIKAYLA Final     CEFAZOLIN Sensitive 8 ug/mL MIKAYLA Final     Comment: Cefazolin MIKAYLA breakpoints are for the treatment of uncomplicated urinary tract   infections.  For the treatment of systemic infections, please contact the   laboratory for additional testing.     CEFEPIME Sensitive <=1 ug/mL MIKAYLA Final     CEFOXITIN Resistant >=64 ug/mL MIKAYLA Final     CEFTAZIDIME Sensitive <=1 ug/mL MIKAYLA Final     CEFTRIAXONE Sensitive <=1 ug/mL MIKAYLA Final     CIPROFLOXACIN Sensitive <=0.25 ug/mL MIKAYLA Final     GENTAMICIN Sensitive <=1 ug/mL MIKAYLA Final     LEVOFLOXACIN Sensitive <=0.12 ug/mL MIKAYLA Final     NITROFURANTOIN Sensitive 32 ug/mL MIKAYLA Final     Piperacillin/Tazo Sensitive <=4.0 ug/mL MIKAYLA Final     TOBRAMYCIN Sensitive <=1 ug/mL MIKAYLA Final     Trimethoprim/Sulfa Sensitive <=1/19 ug/mL MIKAYLA Final

## 2018-12-12 ENCOUNTER — RECORDS - HEALTHEAST (OUTPATIENT)
Dept: LAB | Facility: CLINIC | Age: 83
End: 2018-12-12

## 2018-12-12 LAB
ANION GAP SERPL CALCULATED.3IONS-SCNC: 7 MMOL/L (ref 5–18)
BUN SERPL-MCNC: 17 MG/DL (ref 8–28)
CALCIUM SERPL-MCNC: 9.2 MG/DL (ref 8.5–10.5)
CHLORIDE BLD-SCNC: 104 MMOL/L (ref 98–107)
CO2 SERPL-SCNC: 28 MMOL/L (ref 22–31)
CREAT SERPL-MCNC: 0.65 MG/DL (ref 0.6–1.1)
ERYTHROCYTE [DISTWIDTH] IN BLOOD BY AUTOMATED COUNT: 13.2 % (ref 11–14.5)
GFR SERPL CREATININE-BSD FRML MDRD: >60 ML/MIN/1.73M2
GLUCOSE BLD-MCNC: 79 MG/DL (ref 70–125)
HCT VFR BLD AUTO: 44.4 % (ref 35–47)
HGB BLD-MCNC: 14.5 G/DL (ref 12–16)
MCH RBC QN AUTO: 30.9 PG (ref 27–34)
MCHC RBC AUTO-ENTMCNC: 32.7 G/DL (ref 32–36)
MCV RBC AUTO: 95 FL (ref 80–100)
PLATELET # BLD AUTO: 227 THOU/UL (ref 140–440)
PMV BLD AUTO: 9.3 FL (ref 8.5–12.5)
POTASSIUM BLD-SCNC: 3.8 MMOL/L (ref 3.5–5)
RBC # BLD AUTO: 4.7 MILL/UL (ref 3.8–5.4)
SODIUM SERPL-SCNC: 139 MMOL/L (ref 136–145)
WBC: 7.9 THOU/UL (ref 4–11)

## 2018-12-13 LAB — 25(OH)D3 SERPL-MCNC: 41.6 NG/ML (ref 30–80)

## 2019-06-14 ENCOUNTER — RECORDS - HEALTHEAST (OUTPATIENT)
Dept: LAB | Facility: CLINIC | Age: 84
End: 2019-06-14

## 2019-06-14 LAB
ALBUMIN SERPL-MCNC: 2.9 G/DL (ref 3.5–5)
ALP SERPL-CCNC: 67 U/L (ref 45–120)
ALT SERPL W P-5'-P-CCNC: 11 U/L (ref 0–45)
ANION GAP SERPL CALCULATED.3IONS-SCNC: 7 MMOL/L (ref 5–18)
AST SERPL W P-5'-P-CCNC: 16 U/L (ref 0–40)
BILIRUB SERPL-MCNC: 0.6 MG/DL (ref 0–1)
BUN SERPL-MCNC: 15 MG/DL (ref 8–28)
CALCIUM SERPL-MCNC: 9.6 MG/DL (ref 8.5–10.5)
CHLORIDE BLD-SCNC: 102 MMOL/L (ref 98–107)
CHOLEST SERPL-MCNC: 184 MG/DL
CO2 SERPL-SCNC: 29 MMOL/L (ref 22–31)
CREAT SERPL-MCNC: 0.69 MG/DL (ref 0.6–1.1)
ERYTHROCYTE [DISTWIDTH] IN BLOOD BY AUTOMATED COUNT: 13.8 % (ref 11–14.5)
FASTING STATUS PATIENT QL REPORTED: NORMAL
GFR SERPL CREATININE-BSD FRML MDRD: >60 ML/MIN/1.73M2
GLUCOSE BLD-MCNC: 74 MG/DL (ref 70–125)
HCT VFR BLD AUTO: 47.2 % (ref 35–47)
HDLC SERPL-MCNC: 50 MG/DL
HGB BLD-MCNC: 15.2 G/DL (ref 12–16)
LDLC SERPL CALC-MCNC: 115 MG/DL
MCH RBC QN AUTO: 30.9 PG (ref 27–34)
MCHC RBC AUTO-ENTMCNC: 32.2 G/DL (ref 32–36)
MCV RBC AUTO: 96 FL (ref 80–100)
PLATELET # BLD AUTO: 201 THOU/UL (ref 140–440)
PMV BLD AUTO: 9.1 FL (ref 8.5–12.5)
POTASSIUM BLD-SCNC: 3.7 MMOL/L (ref 3.5–5)
PROT SERPL-MCNC: 6.2 G/DL (ref 6–8)
RBC # BLD AUTO: 4.92 MILL/UL (ref 3.8–5.4)
SODIUM SERPL-SCNC: 138 MMOL/L (ref 136–145)
TRIGL SERPL-MCNC: 96 MG/DL
WBC: 7.4 THOU/UL (ref 4–11)

## 2019-10-30 ENCOUNTER — RECORDS - HEALTHEAST (OUTPATIENT)
Dept: LAB | Facility: CLINIC | Age: 84
End: 2019-10-30

## 2019-10-30 LAB
ANION GAP SERPL CALCULATED.3IONS-SCNC: 6 MMOL/L (ref 5–18)
BUN SERPL-MCNC: 14 MG/DL (ref 8–28)
CALCIUM SERPL-MCNC: 9.5 MG/DL (ref 8.5–10.5)
CHLORIDE BLD-SCNC: 106 MMOL/L (ref 98–107)
CO2 SERPL-SCNC: 30 MMOL/L (ref 22–31)
CREAT SERPL-MCNC: 0.77 MG/DL (ref 0.6–1.1)
GFR SERPL CREATININE-BSD FRML MDRD: >60 ML/MIN/1.73M2
GLUCOSE BLD-MCNC: 74 MG/DL (ref 70–125)
POTASSIUM BLD-SCNC: 4.6 MMOL/L (ref 3.5–5)
SODIUM SERPL-SCNC: 142 MMOL/L (ref 136–145)

## 2020-08-04 ENCOUNTER — RECORDS - HEALTHEAST (OUTPATIENT)
Dept: LAB | Facility: CLINIC | Age: 85
End: 2020-08-04

## 2020-08-05 LAB
25(OH)D3 SERPL-MCNC: 31.6 NG/ML (ref 30–80)
ANION GAP SERPL CALCULATED.3IONS-SCNC: 6 MMOL/L (ref 5–18)
BUN SERPL-MCNC: 12 MG/DL (ref 8–28)
CALCIUM SERPL-MCNC: 9.2 MG/DL (ref 8.5–10.5)
CHLORIDE BLD-SCNC: 105 MMOL/L (ref 98–107)
CO2 SERPL-SCNC: 29 MMOL/L (ref 22–31)
CREAT SERPL-MCNC: 0.68 MG/DL (ref 0.6–1.1)
ERYTHROCYTE [DISTWIDTH] IN BLOOD BY AUTOMATED COUNT: 13.5 % (ref 11–14.5)
GFR SERPL CREATININE-BSD FRML MDRD: >60 ML/MIN/1.73M2
GLUCOSE BLD-MCNC: 68 MG/DL (ref 70–125)
HCT VFR BLD AUTO: 47.9 % (ref 35–47)
HGB BLD-MCNC: 14.9 G/DL (ref 12–16)
MCH RBC QN AUTO: 30.7 PG (ref 27–34)
MCHC RBC AUTO-ENTMCNC: 31.1 G/DL (ref 32–36)
MCV RBC AUTO: 99 FL (ref 80–100)
PLATELET # BLD AUTO: 235 THOU/UL (ref 140–440)
PMV BLD AUTO: 9.1 FL (ref 8.5–12.5)
POTASSIUM BLD-SCNC: 3.8 MMOL/L (ref 3.5–5)
RBC # BLD AUTO: 4.85 MILL/UL (ref 3.8–5.4)
SODIUM SERPL-SCNC: 140 MMOL/L (ref 136–145)
WBC: 8.7 THOU/UL (ref 4–11)

## 2021-02-13 ENCOUNTER — APPOINTMENT (OUTPATIENT)
Dept: GENERAL RADIOLOGY | Facility: CLINIC | Age: 86
DRG: 480 | End: 2021-02-13
Attending: EMERGENCY MEDICINE
Payer: MEDICARE

## 2021-02-13 ENCOUNTER — APPOINTMENT (OUTPATIENT)
Dept: CT IMAGING | Facility: CLINIC | Age: 86
DRG: 480 | End: 2021-02-13
Attending: EMERGENCY MEDICINE
Payer: MEDICARE

## 2021-02-13 ENCOUNTER — HOSPITAL ENCOUNTER (INPATIENT)
Facility: CLINIC | Age: 86
LOS: 3 days | Discharge: NURSING, REHAB OR LONG-TERM CARE | DRG: 480 | End: 2021-02-16
Attending: EMERGENCY MEDICINE | Admitting: INTERNAL MEDICINE
Payer: MEDICARE

## 2021-02-13 DIAGNOSIS — S72.141A CLOSED DISPLACED INTERTROCHANTERIC FRACTURE OF RIGHT FEMUR, INITIAL ENCOUNTER (H): ICD-10-CM

## 2021-02-13 DIAGNOSIS — R91.8 OPACITY OF LUNG ON IMAGING STUDY: ICD-10-CM

## 2021-02-13 DIAGNOSIS — S16.1XXA STRAIN OF NECK MUSCLE, INITIAL ENCOUNTER: ICD-10-CM

## 2021-02-13 DIAGNOSIS — R33.9 URINARY RETENTION: Primary | ICD-10-CM

## 2021-02-13 DIAGNOSIS — M25.521 PAIN IN JOINT INVOLVING UPPER ARM, RIGHT: ICD-10-CM

## 2021-02-13 DIAGNOSIS — D62 ANEMIA DUE TO BLOOD LOSS, ACUTE: ICD-10-CM

## 2021-02-13 LAB
ALBUMIN UR-MCNC: NEGATIVE MG/DL
ANION GAP SERPL CALCULATED.3IONS-SCNC: 5 MMOL/L (ref 3–14)
APPEARANCE UR: CLEAR
BASOPHILS # BLD AUTO: 0.1 10E9/L (ref 0–0.2)
BASOPHILS NFR BLD AUTO: 0.6 %
BILIRUB UR QL STRIP: NEGATIVE
BUN SERPL-MCNC: 13 MG/DL (ref 7–30)
CALCIUM SERPL-MCNC: 9 MG/DL (ref 8.5–10.1)
CHLORIDE SERPL-SCNC: 108 MMOL/L (ref 94–109)
CO2 SERPL-SCNC: 27 MMOL/L (ref 20–32)
COLOR UR AUTO: YELLOW
CREAT SERPL-MCNC: 0.68 MG/DL (ref 0.52–1.04)
DIFFERENTIAL METHOD BLD: ABNORMAL
EOSINOPHIL # BLD AUTO: 0.7 10E9/L (ref 0–0.7)
EOSINOPHIL NFR BLD AUTO: 7 %
ERYTHROCYTE [DISTWIDTH] IN BLOOD BY AUTOMATED COUNT: 13.6 % (ref 10–15)
GFR SERPL CREATININE-BSD FRML MDRD: 79 ML/MIN/{1.73_M2}
GLUCOSE SERPL-MCNC: 114 MG/DL (ref 70–99)
GLUCOSE UR STRIP-MCNC: NEGATIVE MG/DL
HCT VFR BLD AUTO: 48.1 % (ref 35–47)
HGB BLD-MCNC: 14.6 G/DL (ref 11.7–15.7)
HGB UR QL STRIP: NEGATIVE
IMM GRANULOCYTES # BLD: 0.1 10E9/L (ref 0–0.4)
IMM GRANULOCYTES NFR BLD: 0.7 %
KETONES UR STRIP-MCNC: NEGATIVE MG/DL
LABORATORY COMMENT REPORT: NORMAL
LEUKOCYTE ESTERASE UR QL STRIP: NEGATIVE
LYMPHOCYTES # BLD AUTO: 1.2 10E9/L (ref 0.8–5.3)
LYMPHOCYTES NFR BLD AUTO: 11.6 %
MAGNESIUM SERPL-MCNC: 2 MG/DL (ref 1.6–2.3)
MCH RBC QN AUTO: 30.7 PG (ref 26.5–33)
MCHC RBC AUTO-ENTMCNC: 30.4 G/DL (ref 31.5–36.5)
MCV RBC AUTO: 101 FL (ref 78–100)
MONOCYTES # BLD AUTO: 0.9 10E9/L (ref 0–1.3)
MONOCYTES NFR BLD AUTO: 8.7 %
NEUTROPHILS # BLD AUTO: 7.1 10E9/L (ref 1.6–8.3)
NEUTROPHILS NFR BLD AUTO: 71.4 %
NITRATE UR QL: NEGATIVE
NRBC # BLD AUTO: 0 10*3/UL
NRBC BLD AUTO-RTO: 0 /100
PH UR STRIP: 6 PH (ref 5–7)
PHOSPHATE SERPL-MCNC: 4.2 MG/DL (ref 2.5–4.5)
PLATELET # BLD AUTO: 250 10E9/L (ref 150–450)
POTASSIUM SERPL-SCNC: 4.5 MMOL/L (ref 3.4–5.3)
RBC # BLD AUTO: 4.76 10E12/L (ref 3.8–5.2)
RBC #/AREA URNS AUTO: 9 /HPF (ref 0–2)
SARS-COV-2 RNA RESP QL NAA+PROBE: NEGATIVE
SODIUM SERPL-SCNC: 140 MMOL/L (ref 133–144)
SOURCE: ABNORMAL
SP GR UR STRIP: 1.02 (ref 1–1.03)
SPECIMEN SOURCE: NORMAL
UROBILINOGEN UR STRIP-MCNC: NORMAL MG/DL (ref 0–2)
WBC # BLD AUTO: 9.9 10E9/L (ref 4–11)
WBC #/AREA URNS AUTO: 1 /HPF (ref 0–5)

## 2021-02-13 PROCEDURE — 250N000011 HC RX IP 250 OP 636: Performed by: EMERGENCY MEDICINE

## 2021-02-13 PROCEDURE — C9803 HOPD COVID-19 SPEC COLLECT: HCPCS

## 2021-02-13 PROCEDURE — 73080 X-RAY EXAM OF ELBOW: CPT | Mod: RT

## 2021-02-13 PROCEDURE — 250N000013 HC RX MED GY IP 250 OP 250 PS 637: Performed by: INTERNAL MEDICINE

## 2021-02-13 PROCEDURE — 87635 SARS-COV-2 COVID-19 AMP PRB: CPT | Performed by: EMERGENCY MEDICINE

## 2021-02-13 PROCEDURE — 73502 X-RAY EXAM HIP UNI 2-3 VIEWS: CPT

## 2021-02-13 PROCEDURE — 258N000003 HC RX IP 258 OP 636: Performed by: EMERGENCY MEDICINE

## 2021-02-13 PROCEDURE — 73030 X-RAY EXAM OF SHOULDER: CPT | Mod: RT

## 2021-02-13 PROCEDURE — 84100 ASSAY OF PHOSPHORUS: CPT | Performed by: EMERGENCY MEDICINE

## 2021-02-13 PROCEDURE — 83735 ASSAY OF MAGNESIUM: CPT | Performed by: INTERNAL MEDICINE

## 2021-02-13 PROCEDURE — 99285 EMERGENCY DEPT VISIT HI MDM: CPT | Mod: 25

## 2021-02-13 PROCEDURE — 85025 COMPLETE CBC W/AUTO DIFF WBC: CPT | Performed by: EMERGENCY MEDICINE

## 2021-02-13 PROCEDURE — 80048 BASIC METABOLIC PNL TOTAL CA: CPT | Performed by: EMERGENCY MEDICINE

## 2021-02-13 PROCEDURE — 96375 TX/PRO/DX INJ NEW DRUG ADDON: CPT

## 2021-02-13 PROCEDURE — 36415 COLL VENOUS BLD VENIPUNCTURE: CPT | Performed by: EMERGENCY MEDICINE

## 2021-02-13 PROCEDURE — 96374 THER/PROPH/DIAG INJ IV PUSH: CPT

## 2021-02-13 PROCEDURE — 96376 TX/PRO/DX INJ SAME DRUG ADON: CPT

## 2021-02-13 PROCEDURE — 120N000001 HC R&B MED SURG/OB

## 2021-02-13 PROCEDURE — 99223 1ST HOSP IP/OBS HIGH 75: CPT | Mod: AI | Performed by: INTERNAL MEDICINE

## 2021-02-13 PROCEDURE — 250N000011 HC RX IP 250 OP 636: Performed by: INTERNAL MEDICINE

## 2021-02-13 PROCEDURE — 70450 CT HEAD/BRAIN W/O DYE: CPT | Mod: ME

## 2021-02-13 PROCEDURE — 81001 URINALYSIS AUTO W/SCOPE: CPT | Performed by: INTERNAL MEDICINE

## 2021-02-13 PROCEDURE — 93005 ELECTROCARDIOGRAM TRACING: CPT

## 2021-02-13 PROCEDURE — 96361 HYDRATE IV INFUSION ADD-ON: CPT

## 2021-02-13 PROCEDURE — 72125 CT NECK SPINE W/O DYE: CPT | Mod: ME

## 2021-02-13 PROCEDURE — 258N000003 HC RX IP 258 OP 636: Performed by: INTERNAL MEDICINE

## 2021-02-13 PROCEDURE — 83735 ASSAY OF MAGNESIUM: CPT | Performed by: EMERGENCY MEDICINE

## 2021-02-13 RX ORDER — CALCIUM CARBONATE 500 MG/1
1000 TABLET, CHEWABLE ORAL 4 TIMES DAILY PRN
Status: DISCONTINUED | OUTPATIENT
Start: 2021-02-13 | End: 2021-02-16 | Stop reason: HOSPADM

## 2021-02-13 RX ORDER — MORPHINE SULFATE 2 MG/ML
1 INJECTION, SOLUTION INTRAMUSCULAR; INTRAVENOUS
Status: DISCONTINUED | OUTPATIENT
Start: 2021-02-13 | End: 2021-02-15 | Stop reason: ALTCHOICE

## 2021-02-13 RX ORDER — NALOXONE HYDROCHLORIDE 0.4 MG/ML
0.2 INJECTION, SOLUTION INTRAMUSCULAR; INTRAVENOUS; SUBCUTANEOUS
Status: DISCONTINUED | OUTPATIENT
Start: 2021-02-13 | End: 2021-02-16 | Stop reason: HOSPADM

## 2021-02-13 RX ORDER — ACETAMINOPHEN 650 MG/1
650 SUPPOSITORY RECTAL EVERY 4 HOURS PRN
Status: DISCONTINUED | OUTPATIENT
Start: 2021-02-13 | End: 2021-02-16 | Stop reason: HOSPADM

## 2021-02-13 RX ORDER — ACETAMINOPHEN 325 MG/1
975 TABLET ORAL EVERY 8 HOURS
Status: DISCONTINUED | OUTPATIENT
Start: 2021-02-13 | End: 2021-02-16 | Stop reason: HOSPADM

## 2021-02-13 RX ORDER — OXYCODONE HYDROCHLORIDE 5 MG/1
5-10 TABLET ORAL
Status: DISCONTINUED | OUTPATIENT
Start: 2021-02-13 | End: 2021-02-14

## 2021-02-13 RX ORDER — PROCHLORPERAZINE MALEATE 5 MG
5 TABLET ORAL EVERY 6 HOURS PRN
Status: DISCONTINUED | OUTPATIENT
Start: 2021-02-13 | End: 2021-02-16 | Stop reason: HOSPADM

## 2021-02-13 RX ORDER — AMOXICILLIN 250 MG
1 CAPSULE ORAL 2 TIMES DAILY PRN
Status: DISCONTINUED | OUTPATIENT
Start: 2021-02-13 | End: 2021-02-16 | Stop reason: HOSPADM

## 2021-02-13 RX ORDER — LIDOCAINE 40 MG/G
CREAM TOPICAL
Status: DISCONTINUED | OUTPATIENT
Start: 2021-02-13 | End: 2021-02-14

## 2021-02-13 RX ORDER — MORPHINE SULFATE 2 MG/ML
2-4 INJECTION, SOLUTION INTRAMUSCULAR; INTRAVENOUS ONCE
Status: COMPLETED | OUTPATIENT
Start: 2021-02-13 | End: 2021-02-13

## 2021-02-13 RX ORDER — ONDANSETRON 2 MG/ML
4 INJECTION INTRAMUSCULAR; INTRAVENOUS EVERY 6 HOURS PRN
Status: DISCONTINUED | OUTPATIENT
Start: 2021-02-13 | End: 2021-02-15

## 2021-02-13 RX ORDER — BISACODYL 10 MG
10 SUPPOSITORY, RECTAL RECTAL DAILY PRN
Status: DISCONTINUED | OUTPATIENT
Start: 2021-02-13 | End: 2021-02-14

## 2021-02-13 RX ORDER — ONDANSETRON 2 MG/ML
4 INJECTION INTRAMUSCULAR; INTRAVENOUS ONCE
Status: COMPLETED | OUTPATIENT
Start: 2021-02-13 | End: 2021-02-13

## 2021-02-13 RX ORDER — ACETAMINOPHEN 325 MG/1
650 TABLET ORAL EVERY 4 HOURS PRN
Status: DISCONTINUED | OUTPATIENT
Start: 2021-02-13 | End: 2021-02-16 | Stop reason: HOSPADM

## 2021-02-13 RX ORDER — NALOXONE HYDROCHLORIDE 0.4 MG/ML
0.4 INJECTION, SOLUTION INTRAMUSCULAR; INTRAVENOUS; SUBCUTANEOUS
Status: DISCONTINUED | OUTPATIENT
Start: 2021-02-13 | End: 2021-02-16 | Stop reason: HOSPADM

## 2021-02-13 RX ORDER — SODIUM CHLORIDE 9 MG/ML
INJECTION, SOLUTION INTRAVENOUS CONTINUOUS
Status: DISCONTINUED | OUTPATIENT
Start: 2021-02-13 | End: 2021-02-16 | Stop reason: HOSPADM

## 2021-02-13 RX ORDER — LANOLIN ALCOHOL/MO/W.PET/CERES
3 CREAM (GRAM) TOPICAL
Status: DISCONTINUED | OUTPATIENT
Start: 2021-02-13 | End: 2021-02-16 | Stop reason: HOSPADM

## 2021-02-13 RX ORDER — AMOXICILLIN 250 MG
2 CAPSULE ORAL 2 TIMES DAILY PRN
Status: DISCONTINUED | OUTPATIENT
Start: 2021-02-13 | End: 2021-02-16 | Stop reason: HOSPADM

## 2021-02-13 RX ORDER — PROCHLORPERAZINE 25 MG
12.5 SUPPOSITORY, RECTAL RECTAL EVERY 12 HOURS PRN
Status: DISCONTINUED | OUTPATIENT
Start: 2021-02-13 | End: 2021-02-16 | Stop reason: HOSPADM

## 2021-02-13 RX ORDER — ONDANSETRON 4 MG/1
4 TABLET, ORALLY DISINTEGRATING ORAL EVERY 6 HOURS PRN
Status: DISCONTINUED | OUTPATIENT
Start: 2021-02-13 | End: 2021-02-15

## 2021-02-13 RX ORDER — POLYETHYLENE GLYCOL 3350 17 G/17G
17 POWDER, FOR SOLUTION ORAL DAILY PRN
Status: DISCONTINUED | OUTPATIENT
Start: 2021-02-13 | End: 2021-02-16 | Stop reason: HOSPADM

## 2021-02-13 RX ADMIN — PROCHLORPERAZINE EDISYLATE 5 MG: 5 INJECTION INTRAMUSCULAR; INTRAVENOUS at 18:21

## 2021-02-13 RX ADMIN — OXYCODONE HYDROCHLORIDE 5 MG: 5 TABLET ORAL at 22:39

## 2021-02-13 RX ADMIN — ONDANSETRON 4 MG: 2 INJECTION INTRAMUSCULAR; INTRAVENOUS at 13:52

## 2021-02-13 RX ADMIN — MORPHINE SULFATE 2 MG: 2 INJECTION, SOLUTION INTRAMUSCULAR; INTRAVENOUS at 13:48

## 2021-02-13 RX ADMIN — SODIUM CHLORIDE: 9 INJECTION, SOLUTION INTRAVENOUS at 21:11

## 2021-02-13 RX ADMIN — OXYCODONE HYDROCHLORIDE 5 MG: 5 TABLET ORAL at 18:26

## 2021-02-13 RX ADMIN — SODIUM CHLORIDE 1000 ML: 9 INJECTION, SOLUTION INTRAVENOUS at 15:47

## 2021-02-13 RX ADMIN — MORPHINE SULFATE 4 MG: 2 INJECTION, SOLUTION INTRAMUSCULAR; INTRAVENOUS at 15:46

## 2021-02-13 RX ADMIN — ACETAMINOPHEN 975 MG: 325 TABLET, FILM COATED ORAL at 21:05

## 2021-02-13 ASSESSMENT — ENCOUNTER SYMPTOMS
HEADACHES: 1
ABDOMINAL PAIN: 0
ARTHRALGIAS: 1

## 2021-02-13 ASSESSMENT — ACTIVITIES OF DAILY LIVING (ADL): ADLS_ACUITY_SCORE: 29

## 2021-02-13 NOTE — TREATMENT PLAN
Orthopedic Treatment Plan:    85 yo female presents with right intertrochanteric hip fracture.     - To OR tomorrow for right hip cephalomedullary nail if optimized per hospitalist team  - NPO after midnight  - TXA ordered  - Preop abx ordered  - Bedrest for now  - Place quevedo  - Pain control  - Formal consult to follow

## 2021-02-13 NOTE — ED PROVIDER NOTES
History   Chief Complaint:  Fall     HPI History limited by memory issues  Jean M Topitzhofer is a 86 year old female with history of hypertension who presents after a fall. Per nurse report, the patient stood up from her table in the cafeteria and fell unwitnessed. Patient reports that she hit her head and it now hurts. She is mainly complaining of right arm pain near her elbow and right leg pain near her hip. Denies chest pain or abdominal pain. She was unable to get up after the fall.     Review of Systems   Cardiovascular: Negative for chest pain.   Gastrointestinal: Negative for abdominal pain.   Musculoskeletal: Positive for arthralgias (right elbow and hip).   Neurological: Positive for headaches.   All other systems reviewed and are negative.      Allergies:  No Known Allergies    Medications:  Antacid  Crea  Avenova  Centrum    Past Medical History:    UTI  Confusion  Hypertension   Hearing loss    Past Surgical History:    Removal of benign fibrous tumor    Family History:    Mother- hypertension  Son- aneurysm    Social History:  Presents with family member  Lives in assisted living facility, memory care    Physical Exam     Patient Vitals for the past 24 hrs:   BP Pulse SpO2   02/13/21 1430 97/67 51 94 %   02/13/21 1415 105/71 52 95 %   02/13/21 1400 -- 52 94 %   02/13/21 1345 108/73 56 93 %   02/13/21 1332 138/86 54 96 %   02/13/21 1330 138/86 52 --       Physical Exam  General: Frail appearing elderly female laying on the stretcher  Eyes: PERRL, pink conjunctiva  HENT: No palpable scalp hematoma or tenderness. Dry mucous membranes, oropharynx clear.   Neck: Maintained in rigid cervical collar with c-spine precautions. Neck held hyperextended. Tender in midline cervical spine without palpable step off or crepitus.  CV: Normal S1S2, no murmur, rub or gallop. Bradycardic, regular. DP pulse dopplered over right dorsal foot. Palpable left dorsal foot.   Resp: Clear to auscultation bilaterally, no wheezes,  rales or rhonchi. Normal respiratory effort.  GI: Abdomen is soft, nontender and nondistended. No palpable masses. No rebound or guarding.  MSK: Right leg held abducted, externally rotated and is shortened. Tender over the left hip without palpable crepitus. Tender over the right proximal arm, elbow to shoulder without crepitus or deformity. Refuses to range RUE extremity.   Skin: Warm and dry. Generalized pallor. No rashes or lesions or ecchymoses on visible skin.  Neuro: Alert to person. Responds appropriately to all questions and commands. No focal findings appreciated. Sensation intact to light touch over the extremities, exam limited.  Psych: Blunted affect.    Emergency Department Course   ECG  ECG taken at 1339, ECG read at 1347  Fall   No significant changes as compared to prior, dated 8/11/18.  Rate 54 bpm. NV interval 162 ms. QRS duration 130 ms. QT/QTc 460/436 ms. P-R-T axes 58 -42 9. Sinus bradycardia. Left axis deviation. Right bundle branch block. Septal infarct, age undetermined.      Imaging:  Head CT w/o Contrast  IMPRESSION:   1. No acute intracranial abnormality.   2. Disproportionate enlargement of the ventricular system compared to   the degree of volume loss which can be seen in the setting of normal   pressure hydrocephalus.  As read by Radiology.     Cervical Spine CT w/o contrast  IMPRESSION:   1. No evidence of acute fracture or subluxation in the cervical spine.   2. Severe degenerative change with multiple severe stenoses.  As read by Radiology.      XR Pelvis w Hip Right 1 view:  IMPRESSION:   1.  Comminuted intratrochanteric fracture of the right femur. There is   fracture impaction, varus angulation, and a small mildly displaced   lesser trochanteric fracture fragment.   2.  Mild right hip degenerative arthrosis.   3.  Degenerative changes in the lower lumbar spine.   4.  Bone demineralization.  As read by Radiology.      XR Shoulder Right G/E 3 Views  IMPRESSION:   1.  No fracture or  joint malalignment.   2.  Mild right glenohumeral degenerative arthrosis.   3.  Bone demineralization.   4.  Rounded 15 mm right lung airspace opacity. This is new or more   conspicuous since 12/30/2014. Dedicated chest CT is recommended in   further evaluation.  As read by Radiology.      XR Elbow Right G/E 3 views:  IMPRESSION:   1.  Normal joint spacing and alignment.   2.  No fracture or joint effusion.   3.  Bone demineralization.  As read by Radiology.     Laboratory:  CBC: WBC 9.9, HGB 14.6,   BMP: Glucose 114 (H) o/w WNL (Creatinine 0.68)    Asymptomatic COVID-19 Virus (Coronavirus) by PCR: Pending     Emergency Department Course:    Reviewed:  I reviewed nursing notes, vitals, past medical history and care everywhere    Assessments:  1332 I obtained history and examined the patient as noted above.   1604 I rechecked the patient and we discussed imaging and laboratory results. We also spoke about the plan for admission.  Cervical collar removed.  The patient remains somewhat hyperextended but notes she feels better with the collar removed.  She denies any new concerns-ongoing leg pain.    Consults:   1551 I spoke with Dr. Alan of the Orthopedics service from [facility] regarding patient's presentation, findings, and plan of care.   1622 I spoke with Dr. Carson of the hospitalist service regarding patient's presentation, findings, and plan of care. They are in agreement to accept the patient for admission to a bed for further monitoring, care, and treatment.      Interventions:  1348 Morphine 2 mg IV   1352 Zofran 4 mg IV   1546 Morphine 4 mg IV   1547 Normal Saline 1000 mL IV     Disposition:  The patient was admitted to the hospital under the care of Dr. Carson.     Impression & Plan     Medical Decision Making:    Jean M Topitzhofer is a 86 year old female who presents for evaluation of right arm and hip pain after an unwitnessed fall. CMS is intact distally in the extremiies.  Xrays reveal a fracture of  the hip that will need orthopedic surgery.  I discussed her with orthopedics.  There is no sign of fracture of the arm.  Incidentally was a increased size lung opacity on x-ray that radiology recommended CT scan for.  This is not emergent but can be further looked into on hospitalization or outpatient.  Fortunately head CT and cervical spine CT did not show any skull fracture or intracranial hemorrhage.  He has significant stenosis of her C-spine but no evidence of subluxation or fracture.  I spoke with the patient's family member and the patient and updated them.  There were no new concerns.  All questions were answered.  She will be admitted under the care of the hospitalist.     Covid-19  Jean M Topitzhofer was evaluated during a global COVID-19 pandemic, which necessitated consideration that the patient might be at risk for infection with the SARS-CoV-2 virus that causes COVID-19.   Applicable protocols for evaluation were followed during the patient's care.   COVID-19 was considered as part of the patient's evaluation. The plan for testing is:  a test was obtained during this visit.    Diagnosis:    ICD-10-CM    1. Closed displaced intertrochanteric fracture of right femur, initial encounter (H)  S72.141A    2. Strain of neck muscle, initial encounter  S16.1XXA    3. Pain in joint involving upper arm, right  M25.521    4. Opacity of lung on imaging study  R91.8        Scribe Disclosure:  I, Rosie Christian, am serving as a scribe at 1:25 PM on 2/13/2021 to document services personally performed by Samina Ferris MD based on my observations and the provider's statements to me.           Samina Ferris MD  02/13/21 [id number]

## 2021-02-13 NOTE — ED NOTES
[facility]  ED Nurse Handoff Report    Jean M Topitzhofer is a 86 year old female   ED Chief complaint: Fall  . ED Diagnosis:   Final diagnoses:   Closed displaced intertrochanteric fracture of right femur, initial encounter (H)   Strain of neck muscle, initial encounter   Pain in joint involving upper arm, right   Opacity of lung on imaging study     Allergies: No Known Allergies    Code Status: Full Code  Activity level - Baseline/Home:  Stand by Assist. Activity Level - Current:   Total Care. Lift room needed: No. Bariatric: No   Needed: No   Isolation: No. Infection: Not Applicable.     Vital Signs:   Vitals:    02/13/21 1400 02/13/21 1415 02/13/21 1430 02/13/21 1445   BP: (!) 86/62 105/71 97/67 106/75   Pulse: 52 52 51    SpO2: 94% 95% 94% 95%       Cardiac Rhythm:  ,      Pain level:    Patient confused: Yes-Niece is present and pt seems to understand her-pt very [place]. Patient Falls Risk: Yes.   Elimination Status: Has voided   Patient Report - Initial Complaint: fall. Focused Assessment:   Tests Performed: lab/imaging. Abnormal Results:   Labs Ordered and Resulted from Time of ED Arrival Up to the Time of Departure from the ED   BASIC METABOLIC PANEL - Abnormal; Notable for the following components:       Result Value    Glucose 114 (*)     All other components within normal limits   CBC WITH PLATELETS DIFFERENTIAL - Abnormal; Notable for the following components:    Hematocrit 48.1 (*)      (*)     MCHC 30.4 (*)     All other components within normal limits   SARS-COV-2 (COVID-19) VIRUS RT-PCR   PERIPHERAL IV CATHETER   CARDIAC CONTINUOUS MONITORING     . Musculoskeletal - Pain Body Location:  (Pt c/o right upper arm/elbow pain. Right hip pain, pronated laterally) RUE Extremity Movement: active ROM moderately impaired  RLE Extremity Movement: active ROM moderately impaired  CMS Intact: Yes   Treatments provided: MAR  Family Comments: Niece present  OBS brochure/video  discussed/provided to patient:  N/A  ED Medications:   Medications   0.9% sodium chloride BOLUS (1,000 mLs Intravenous New Bag 2/13/21 1547)   morphine (PF) injection 2-4 mg (2 mg Intravenous Given 2/13/21 1348)   ondansetron (ZOFRAN) injection 4 mg (4 mg Intravenous Given 2/13/21 1352)   morphine (PF) injection 2-4 mg (4 mg Intravenous Given 2/13/21 1546)     Drips infusing:  No  For the majority of the shift, the patient's behavior Green. Interventions performed were .    Sepsis treatment initiated: No     Patient tested for COVID 19 prior to admission: YES    ED Nurse Name/Phone Number: Soila Marquez RN,   4:46 PM    RECEIVING UNIT ED HANDOFF REVIEW    Above ED Nurse Handoff Report was reviewed: Yes  Reviewed by: Giselle Alatorre RN on February 13, 2021 at 5:22 PM

## 2021-02-13 NOTE — H&P
[facility]  History and Physical Hospitalist       Date of Admission:  2/13/2021    Assessment & Plan   Jean M Topitzhofer is a 86 year old female with dementia and hypertension who presents to [facility] ED on 2/13/21 after a mechanical fall at her memory care unit.     VS in ED: Te,[ 96.8, HR 68, /69, RR 16 pulse ox 97% (started on 2 L NC but removed. BMP and CBC unremarkable. COVID-19 negative. XR pelvis w/ right hip: comminuted intra trochanteric fracture of right femur, mild right hip arthrosis, DJD of lower lumbar spine, and bone demineralization. XR shoulder right: No fracture or joint malalignment. Rounded 15 mm right lung airspace opacity - changed from 12/2014 - CT chest recommended. XR right elbow unremarkable. CT head w/o contrast: no acute abn. Enlargement of ventricular system compared to volume loss - consider NPH. CT cervical spine: No evidence of acute fracture or subluxation in the cervical spine. Severe degenerative changes with multiple severe stenosis.     Mechanical fall resulting in comminuted intra trochanteric fracture of the right femur  -orthopedic surgery consulted - plan for OR in AM. No additional cardiopulmonary testing indicated. RCRI: 0. 2/13 COVID-19 negative.   -Bed rest until cleared by ortho  -Defer DVT prophylaxis to Ortho  -Pain control: Scheduled acetaminophen; prn oxycodone, acetaminophen, and morphine  -Fall precautions, CMS/neuro checks  -Tele monitoring - presumed mechanical fall but unwitnessed. EKG unchanged from 2018   -Hx of UTI - check UA. Low suspicion given no WBC/fevers. However, patient is confused with a fall.     Dementia: Unknown baseline cognitive status. In ED only oriented to self. Complicates cares.     Hypertension: BP soft on admit likely secondary to narcotics. Hold PTA triamterene-hydrochlorothiazide      Incidental findings:  -Rounded 15 mm right lung airspace opacity - changed from 12/2014 - CT chest recommended     -CT head:  Disproportionate enlargement of the ventricular system compared to the degree of volume loss - consider NPH - unable to full assess additional symptoms - limited mobility with fracture and dementia at baseline. Monitor.     FEN: Oral hydration/NS 75 ml/hr; mag/phosphorus; regular/NPO mdnt  Activity: Bedrest - PT/OT following surgery   DVT Prophylaxis: Pneumatic Compression Devices  Code Status: Full Code - presumed based on prior code status. Clarify with family in AM.     Expected discharge: 2 - 3 days, recommended to TBD once Ortho eval..    Beckie Carson DO    Primary Care Physician   [facility]    Chief Complaint   Fall with hip fracture  History is obtained from the electronic health record and emergency department physician (Dr. Samina Ferris)    History of Present Illness    Jean M Topitzhofer is a 86 year old female with dementia and hypertension who presents to [facility] ED on 2/13/21 after a mechanical fall at her memory care unit.     Family unavailable. Patient confused limiting ROS. Patient presented to [facility] ED following an unwitnessed mechanical fall at her memory care unit. Reports that she hit her head is having a headache. In addition, complained of right arm pain. Moving upper extremities and left leg. No mobility in right lower extremity due to pain. Afebrile.     Past Medical History    I have reviewed this patient's medical history and updated it with pertinent information if needed.   Past Medical History:   Diagnosis Date     Benign essential hypertension      Dementia (H)      Past Surgical History   I have reviewed this patient's surgical history and updated it with pertinent information if needed.  Past Surgical History:   Procedure Laterality Date     ABDOMEN SURGERY  1980s    removal of benign fibrous tumor     Prior to Admission Medications   Prior to Admission Medications   Prescriptions Last Dose Informant Patient Reported? Taking?   Emollient (CERAVE) CREA   Yes No    Sig: Externally apply topically daily To boy legs   Eyelid Cleansers (AVENOVA EX)   Yes No   Sig: Externally apply 1 spray topically daily Spray to closed eyes covering eye lids   Multiple Vitamins-Minerals (CENTRUM) TABS   No No   Sig: Take 1 tablet by mouth daily   acetaminophen (TYLENOL) 500 MG tablet   Yes No   Sig: Take 1,000 mg by mouth 3 times daily as needed for mild pain   brimonidine (ALPHAGAN-P) 0.15 % ophthalmic solution   Yes No   Sig: Place 1 drop into the right eye 3 times daily   calcium carbonate (ANTACID EXTRA STRENGTH) 750 MG CHEW   Yes No   Sig: Take 1,500 mg by mouth daily as needed   cycloSPORINE (RESTASIS) 0.05 % ophthalmic emulsion   Yes No   Sig: Place 1 drop into both eyes 2 times daily   dorzolamide-timolol (COSOPT) 2-0.5 % ophthalmic solution   Yes No   Sig: Place 1 drop into the right eye every morning    fish oil-omega-3 fatty acids 1000 MG capsule   Yes No   Sig: Take 2 g by mouth daily   guaiFENesin (ROBITUSSIN) 100 MG/5ML SYRP   Yes No   Sig: Take 10 mLs by mouth every 4 hours as needed for cough   latanoprost (XALATAN) 0.005 % ophthalmic solution   Yes No   Sig: Place 1 drop into both eyes At Bedtime    potassium chloride SA (K-DUR/KLOR-CON M) 10 MEQ CR tablet   Yes No   Sig: Take 10 mEq by mouth daily   triamterene-hydrochlorothiazide (DYAZIDE) 37.5-25 MG per capsule   No No   Sig: Take 1 capsule by mouth daily      Facility-Administered Medications: None     Allergies   No Known Allergies    Social History   I have reviewed this patient's social history and updated it with pertinent information if needed. Jean M Topitzhofer  reports that she has never smoked. She has never used smokeless tobacco. She reports that she does not drink alcohol or use drugs.    Family History   Family history reviewed with patient and is noncontributory.    Review of Systems   Review of systems not obtained due to patient factors - confusion    Physical Exam       BP: 106/75 Pulse: 51     SpO2: 95 %       Vital Signs with Ranges  Pulse:  [51-56] 51  BP: ()/(62-86) 106/75  SpO2:  [93 %-96 %] 95 %  0 lbs 0 oz    Constitutional: Awake, restless, frail/thin, no apparent distress.  HEENT: AT. NC. Conjunctiva non-injected. Sclera anicteric. Pupils examined and normal. Moist mucous membranes  Respiratory: No use of accessory respiratory muscles. Clear to auscultation bilaterally, no crackles or wheezing.  Cardiovascular: Regular rate and rhythm, normal S1 and S2, and no murmur noted.  GI: Soft, non-distended, non-tender, normal bowel sounds. No organomegaly.   Lymph/Hematologic: No anterior cervical or supraclavicular adenopathy.  Skin: No rashes, no cyanosis, no edema. Pale +b/l pulses  Musculoskeletal: Right hip abducted, externally rotated, and shortened. Tender to palpation over right hip. No erythema, ecchymosis, or edema noted.   Neurologic: Cranial nerves 2-12 intact, normal strength and sensation.  Psychiatric: Alert. Restless. Confused. No obvious anxiety/depression.     Data   Data reviewed today:  I personally reviewed     Recent Labs   Lab 02/13/21  1419   WBC 9.9   HGB 14.6   *         POTASSIUM 4.5   CHLORIDE 108   CO2 27   BUN 13   CR 0.68   ANIONGAP 5   TOBY 9.0   *     Recent Results (from the past 24 hour(s))   CT Head w/o Contrast    Narrative    CT SCAN OF THE HEAD WITHOUT CONTRAST   2/13/2021 3:10 PM     HISTORY: Head trauma, mod-severe.    TECHNIQUE:  Axial images of the head and coronal reformations without  IV contrast material. Radiation dose for this scan was reduced using  automated exposure control, adjustment of the mA and/or kV according  to patient size, or iterative reconstruction technique.    COMPARISON: None.    FINDINGS:  Disproportionate enlargement of the ventricular system  compared to the degree of bone loss. No evidence of acute ischemia or  hemorrhage. The visualized calvarium, tympanic cavities, and mastoid  cavities are unremarkable.       Impression    IMPRESSION:   1. No acute intracranial abnormality.  2. Disproportionate enlargement of the ventricular system compared to  the degree of volume loss which can be seen in the setting of normal  pressure hydrocephalus.    ILYA ALMONTE MD   Cervical spine CT w/o contrast    Narrative    CT CERVICAL SPINE WITHOUT CONTRAST   2/13/2021 3:12 PM     HISTORY: Neck trauma (Age >= 65y); neck pain, trauma.     TECHNIQUE: Axial images of the cervical spine were obtained without  intravenous contrast. Multiplanar reformations were performed.   Radiation dose for this scan was reduced using automated exposure  control, adjustment of the mA and/or kV according to patient size, or  iterative reconstruction technique.    COMPARISON: None.    FINDINGS: No evidence of acute fracture or posttraumatic subluxation.  Severe degenerative change. Multiple areas of spinal canal stenosis  including severe spinal canal stenosis at C3-C4 and C4-C5. Multilevel  severe foraminal stenoses. Scattered vascular calcifications. Multiple  nonspecific lung nodules, some of which are at least partially  calcified. Outpatient chest CT correlation could be performed.      Impression    IMPRESSION:   1. No evidence of acute fracture or subluxation in the cervical spine.  2. Severe degenerative change with multiple severe stenoses.    ILYA ALMONTE MD   XR Pelvis w Hip Right 1 View    Narrative    PELVIS AND RIGHT HIP, ONE VIEW  2/13/2021 3:25 PM     INDICATION: Right-sided hip pain after trauma.     COMPARISON: None.      Impression    IMPRESSION:  1.  Comminuted intratrochanteric fracture of the right femur. There is  fracture impaction, varus angulation, and a small mildly displaced  lesser trochanteric fragment.  2.  Mild right hip degenerative arthrosis.  3.  Degenerative changes in the lower lumbar spine.  4.  Bone demineralization.    ANH ANN MD   XR Shoulder Right G/E 3 Views    Narrative    RIGHT SHOULDER THREE OR MORE  VIEWS  2/13/2021 3:25 PM     INDICATION: Right-sided shoulder pain after an injury.     COMPARISON: None.      Impression    IMPRESSION:  1.  No fracture or joint malalignment.  2.  Mild right glenohumeral degenerative arthrosis.  3.  Bone demineralization.  4.  Rounded 15 mm right lung airspace opacity. This is new or more  conspicuous since 12/30/2014. Dedicated chest CT is recommended in  further evaluation.    ANH ANN MD   XR Elbow Right G/E 3 Views    Narrative    RIGHT ELBOW THREE OR MORE VIEWS  2/13/2021 3:26 PM     INDICATION: Right elbow pain after an injury.     COMPARISON: None.      Impression    IMPRESSION:  1.  Normal joint spacing and alignment.  2.  No fracture or joint effusion.  3.  Bone demineralization.    ANH ANN MD

## 2021-02-13 NOTE — ED TRIAGE NOTES
Pt presents via EMS after tripping and falling at her Memory Care facility.  Staff reported that nobody actually witnessed the fall but they were in the room. No known LOC, family was notified. ABC's intact.

## 2021-02-14 ENCOUNTER — APPOINTMENT (OUTPATIENT)
Dept: GENERAL RADIOLOGY | Facility: CLINIC | Age: 86
DRG: 480 | End: 2021-02-14
Attending: INTERNAL MEDICINE
Payer: MEDICARE

## 2021-02-14 ENCOUNTER — ANESTHESIA EVENT (OUTPATIENT)
Dept: SURGERY | Facility: CLINIC | Age: 86
DRG: 480 | End: 2021-02-14
Payer: MEDICARE

## 2021-02-14 ENCOUNTER — ANESTHESIA (OUTPATIENT)
Dept: SURGERY | Facility: CLINIC | Age: 86
DRG: 480 | End: 2021-02-14
Payer: MEDICARE

## 2021-02-14 LAB
ANION GAP SERPL CALCULATED.3IONS-SCNC: 5 MMOL/L (ref 3–14)
BUN SERPL-MCNC: 24 MG/DL (ref 7–30)
CALCIUM SERPL-MCNC: 8.1 MG/DL (ref 8.5–10.1)
CHLORIDE SERPL-SCNC: 110 MMOL/L (ref 94–109)
CO2 SERPL-SCNC: 25 MMOL/L (ref 20–32)
CREAT SERPL-MCNC: 0.98 MG/DL (ref 0.52–1.04)
ERYTHROCYTE [DISTWIDTH] IN BLOOD BY AUTOMATED COUNT: 14 % (ref 10–15)
GFR SERPL CREATININE-BSD FRML MDRD: 52 ML/MIN/{1.73_M2}
GLUCOSE SERPL-MCNC: 109 MG/DL (ref 70–99)
HCT VFR BLD AUTO: 30.2 % (ref 35–47)
HGB BLD-MCNC: 9.2 G/DL (ref 11.7–15.7)
MCH RBC QN AUTO: 31 PG (ref 26.5–33)
MCHC RBC AUTO-ENTMCNC: 30.5 G/DL (ref 31.5–36.5)
MCV RBC AUTO: 102 FL (ref 78–100)
PLATELET # BLD AUTO: 168 10E9/L (ref 150–450)
POTASSIUM SERPL-SCNC: 4.5 MMOL/L (ref 3.4–5.3)
RBC # BLD AUTO: 2.97 10E12/L (ref 3.8–5.2)
SODIUM SERPL-SCNC: 140 MMOL/L (ref 133–144)
WBC # BLD AUTO: 13.2 10E9/L (ref 4–11)

## 2021-02-14 PROCEDURE — 80048 BASIC METABOLIC PNL TOTAL CA: CPT | Performed by: INTERNAL MEDICINE

## 2021-02-14 PROCEDURE — 250N000011 HC RX IP 250 OP 636: Performed by: ORTHOPAEDIC SURGERY

## 2021-02-14 PROCEDURE — 0QS636Z REPOSITION RIGHT UPPER FEMUR WITH INTRAMEDULLARY INTERNAL FIXATION DEVICE, PERCUTANEOUS APPROACH: ICD-10-PCS | Performed by: ORTHOPAEDIC SURGERY

## 2021-02-14 PROCEDURE — 250N000011 HC RX IP 250 OP 636: Performed by: ANESTHESIOLOGY

## 2021-02-14 PROCEDURE — 250N000009 HC RX 250: Performed by: ORTHOPAEDIC SURGERY

## 2021-02-14 PROCEDURE — 258N000003 HC RX IP 258 OP 636: Performed by: ANESTHESIOLOGY

## 2021-02-14 PROCEDURE — 999N000141 HC STATISTIC PRE-PROCEDURE NURSING ASSESSMENT: Performed by: ORTHOPAEDIC SURGERY

## 2021-02-14 PROCEDURE — 250N000011 HC RX IP 250 OP 636: Performed by: NURSE ANESTHETIST, CERTIFIED REGISTERED

## 2021-02-14 PROCEDURE — 36415 COLL VENOUS BLD VENIPUNCTURE: CPT | Performed by: INTERNAL MEDICINE

## 2021-02-14 PROCEDURE — 250N000009 HC RX 250: Performed by: NURSE ANESTHETIST, CERTIFIED REGISTERED

## 2021-02-14 PROCEDURE — 360N000083 HC SURGERY LEVEL 3 W/ FLUORO, PER MIN: Performed by: ORTHOPAEDIC SURGERY

## 2021-02-14 PROCEDURE — 250N000013 HC RX MED GY IP 250 OP 250 PS 637: Performed by: PHYSICIAN ASSISTANT

## 2021-02-14 PROCEDURE — 250N000011 HC RX IP 250 OP 636: Performed by: INTERNAL MEDICINE

## 2021-02-14 PROCEDURE — 85027 COMPLETE CBC AUTOMATED: CPT | Performed by: INTERNAL MEDICINE

## 2021-02-14 PROCEDURE — 999N000179 XR SURGERY CARM FLUORO LESS THAN 5 MIN W STILLS: Mod: TC

## 2021-02-14 PROCEDURE — 120N000001 HC R&B MED SURG/OB

## 2021-02-14 PROCEDURE — 258N000003 HC RX IP 258 OP 636: Performed by: NURSE ANESTHETIST, CERTIFIED REGISTERED

## 2021-02-14 PROCEDURE — 250N000013 HC RX MED GY IP 250 OP 250 PS 637: Performed by: INTERNAL MEDICINE

## 2021-02-14 PROCEDURE — 250N000011 HC RX IP 250 OP 636: Performed by: PHYSICIAN ASSISTANT

## 2021-02-14 PROCEDURE — 99233 SBSQ HOSP IP/OBS HIGH 50: CPT | Performed by: HOSPITALIST

## 2021-02-14 PROCEDURE — 370N000017 HC ANESTHESIA TECHNICAL FEE, PER MIN: Performed by: ORTHOPAEDIC SURGERY

## 2021-02-14 PROCEDURE — C1769 GUIDE WIRE: HCPCS | Performed by: ORTHOPAEDIC SURGERY

## 2021-02-14 PROCEDURE — 272N000001 HC OR GENERAL SUPPLY STERILE: Performed by: ORTHOPAEDIC SURGERY

## 2021-02-14 PROCEDURE — 710N000009 HC RECOVERY PHASE 1, LEVEL 1, PER MIN: Performed by: ORTHOPAEDIC SURGERY

## 2021-02-14 PROCEDURE — C1713 ANCHOR/SCREW BN/BN,TIS/BN: HCPCS | Performed by: ORTHOPAEDIC SURGERY

## 2021-02-14 PROCEDURE — 258N000003 HC RX IP 258 OP 636: Performed by: PHYSICIAN ASSISTANT

## 2021-02-14 PROCEDURE — 258N000003 HC RX IP 258 OP 636: Performed by: INTERNAL MEDICINE

## 2021-02-14 PROCEDURE — 250N000013 HC RX MED GY IP 250 OP 250 PS 637: Performed by: ANESTHESIOLOGY

## 2021-02-14 DEVICE — IMP NAL SYN CAN FEM PROX TFNA 12X170MM 130D 04.037.242S: Type: IMPLANTABLE DEVICE | Site: HIP | Status: FUNCTIONAL

## 2021-02-14 DEVICE — IMPLANTABLE DEVICE: Type: IMPLANTABLE DEVICE | Site: HIP | Status: FUNCTIONAL

## 2021-02-14 DEVICE — IMP SCR SYN 5.0 TI LOCK T25 STARDRIVE 40MM 04.005.530S: Type: IMPLANTABLE DEVICE | Site: HIP | Status: FUNCTIONAL

## 2021-02-14 RX ORDER — ONDANSETRON 2 MG/ML
4 INJECTION INTRAMUSCULAR; INTRAVENOUS EVERY 30 MIN PRN
Status: DISCONTINUED | OUTPATIENT
Start: 2021-02-14 | End: 2021-02-14 | Stop reason: HOSPADM

## 2021-02-14 RX ORDER — HYDROMORPHONE HYDROCHLORIDE 1 MG/ML
.3-.5 INJECTION, SOLUTION INTRAMUSCULAR; INTRAVENOUS; SUBCUTANEOUS EVERY 10 MIN PRN
Status: DISCONTINUED | OUTPATIENT
Start: 2021-02-14 | End: 2021-02-14 | Stop reason: HOSPADM

## 2021-02-14 RX ORDER — BISACODYL 10 MG
10 SUPPOSITORY, RECTAL RECTAL DAILY PRN
Status: DISCONTINUED | OUTPATIENT
Start: 2021-02-14 | End: 2021-02-16 | Stop reason: HOSPADM

## 2021-02-14 RX ORDER — ONDANSETRON 2 MG/ML
4 INJECTION INTRAMUSCULAR; INTRAVENOUS EVERY 6 HOURS PRN
Status: DISCONTINUED | OUTPATIENT
Start: 2021-02-14 | End: 2021-02-16 | Stop reason: HOSPADM

## 2021-02-14 RX ORDER — DOCUSATE SODIUM 100 MG/1
100 CAPSULE, LIQUID FILLED ORAL 2 TIMES DAILY
Status: DISCONTINUED | OUTPATIENT
Start: 2021-02-14 | End: 2021-02-16 | Stop reason: HOSPADM

## 2021-02-14 RX ORDER — ACETAMINOPHEN 325 MG/1
1000 TABLET ORAL 3 TIMES DAILY
COMMUNITY

## 2021-02-14 RX ORDER — PROPOFOL 10 MG/ML
INJECTION, EMULSION INTRAVENOUS PRN
Status: DISCONTINUED | OUTPATIENT
Start: 2021-02-14 | End: 2021-02-14

## 2021-02-14 RX ORDER — LIDOCAINE 40 MG/G
CREAM TOPICAL
Status: DISCONTINUED | OUTPATIENT
Start: 2021-02-14 | End: 2021-02-14 | Stop reason: HOSPADM

## 2021-02-14 RX ORDER — CEFAZOLIN SODIUM 2 G/100ML
2 INJECTION, SOLUTION INTRAVENOUS
Status: COMPLETED | OUTPATIENT
Start: 2021-02-14 | End: 2021-02-14

## 2021-02-14 RX ORDER — HYDROMORPHONE HYDROCHLORIDE 1 MG/ML
0.2 INJECTION, SOLUTION INTRAMUSCULAR; INTRAVENOUS; SUBCUTANEOUS
Status: DISCONTINUED | OUTPATIENT
Start: 2021-02-14 | End: 2021-02-16 | Stop reason: HOSPADM

## 2021-02-14 RX ORDER — TRANEXAMIC ACID 10 MG/ML
1 INJECTION, SOLUTION INTRAVENOUS ONCE
Status: COMPLETED | OUTPATIENT
Start: 2021-02-14 | End: 2021-02-14

## 2021-02-14 RX ORDER — SODIUM CHLORIDE, SODIUM LACTATE, POTASSIUM CHLORIDE, CALCIUM CHLORIDE 600; 310; 30; 20 MG/100ML; MG/100ML; MG/100ML; MG/100ML
INJECTION, SOLUTION INTRAVENOUS CONTINUOUS
Status: DISCONTINUED | OUTPATIENT
Start: 2021-02-14 | End: 2021-02-16 | Stop reason: HOSPADM

## 2021-02-14 RX ORDER — LABETALOL HYDROCHLORIDE 5 MG/ML
10 INJECTION, SOLUTION INTRAVENOUS
Status: DISCONTINUED | OUTPATIENT
Start: 2021-02-14 | End: 2021-02-14 | Stop reason: HOSPADM

## 2021-02-14 RX ORDER — NALOXONE HYDROCHLORIDE 0.4 MG/ML
0.2 INJECTION, SOLUTION INTRAMUSCULAR; INTRAVENOUS; SUBCUTANEOUS
Status: DISCONTINUED | OUTPATIENT
Start: 2021-02-14 | End: 2021-02-14 | Stop reason: HOSPADM

## 2021-02-14 RX ORDER — FENTANYL CITRATE 50 UG/ML
25-50 INJECTION, SOLUTION INTRAMUSCULAR; INTRAVENOUS
Status: DISCONTINUED | OUTPATIENT
Start: 2021-02-14 | End: 2021-02-14 | Stop reason: HOSPADM

## 2021-02-14 RX ORDER — AMOXICILLIN 250 MG
1 CAPSULE ORAL 2 TIMES DAILY
Status: DISCONTINUED | OUTPATIENT
Start: 2021-02-14 | End: 2021-02-16 | Stop reason: HOSPADM

## 2021-02-14 RX ORDER — ACETAMINOPHEN 325 MG/1
975 TABLET ORAL EVERY 8 HOURS
Status: DISCONTINUED | OUTPATIENT
Start: 2021-02-14 | End: 2021-02-14

## 2021-02-14 RX ORDER — LIDOCAINE HYDROCHLORIDE 10 MG/ML
INJECTION, SOLUTION INFILTRATION; PERINEURAL PRN
Status: DISCONTINUED | OUTPATIENT
Start: 2021-02-14 | End: 2021-02-14

## 2021-02-14 RX ORDER — HYDRALAZINE HYDROCHLORIDE 20 MG/ML
2.5-5 INJECTION INTRAMUSCULAR; INTRAVENOUS EVERY 10 MIN PRN
Status: DISCONTINUED | OUTPATIENT
Start: 2021-02-14 | End: 2021-02-14 | Stop reason: HOSPADM

## 2021-02-14 RX ORDER — ACETAMINOPHEN 325 MG/1
975 TABLET ORAL ONCE
Status: COMPLETED | OUTPATIENT
Start: 2021-02-14 | End: 2021-02-14

## 2021-02-14 RX ORDER — KETOROLAC TROMETHAMINE 30 MG/ML
15 INJECTION, SOLUTION INTRAMUSCULAR; INTRAVENOUS ONCE
Status: COMPLETED | OUTPATIENT
Start: 2021-02-14 | End: 2021-02-14

## 2021-02-14 RX ORDER — MAGNESIUM HYDROXIDE 1200 MG/15ML
LIQUID ORAL PRN
Status: DISCONTINUED | OUTPATIENT
Start: 2021-02-14 | End: 2021-02-14 | Stop reason: HOSPADM

## 2021-02-14 RX ORDER — SODIUM CHLORIDE, SODIUM LACTATE, POTASSIUM CHLORIDE, CALCIUM CHLORIDE 600; 310; 30; 20 MG/100ML; MG/100ML; MG/100ML; MG/100ML
INJECTION, SOLUTION INTRAVENOUS CONTINUOUS
Status: DISCONTINUED | OUTPATIENT
Start: 2021-02-14 | End: 2021-02-14 | Stop reason: HOSPADM

## 2021-02-14 RX ORDER — DEXAMETHASONE SODIUM PHOSPHATE 4 MG/ML
INJECTION, SOLUTION INTRA-ARTICULAR; INTRALESIONAL; INTRAMUSCULAR; INTRAVENOUS; SOFT TISSUE PRN
Status: DISCONTINUED | OUTPATIENT
Start: 2021-02-14 | End: 2021-02-14

## 2021-02-14 RX ORDER — NALOXONE HYDROCHLORIDE 0.4 MG/ML
0.4 INJECTION, SOLUTION INTRAMUSCULAR; INTRAVENOUS; SUBCUTANEOUS
Status: DISCONTINUED | OUTPATIENT
Start: 2021-02-14 | End: 2021-02-14 | Stop reason: HOSPADM

## 2021-02-14 RX ORDER — CEFAZOLIN SODIUM 1 G/3ML
1 INJECTION, POWDER, FOR SOLUTION INTRAMUSCULAR; INTRAVENOUS SEE ADMIN INSTRUCTIONS
Status: DISCONTINUED | OUTPATIENT
Start: 2021-02-14 | End: 2021-02-14 | Stop reason: HOSPADM

## 2021-02-14 RX ORDER — ONDANSETRON 4 MG/1
4 TABLET, ORALLY DISINTEGRATING ORAL EVERY 6 HOURS PRN
Status: DISCONTINUED | OUTPATIENT
Start: 2021-02-14 | End: 2021-02-16 | Stop reason: HOSPADM

## 2021-02-14 RX ORDER — BUPIVACAINE HYDROCHLORIDE AND EPINEPHRINE 5; 5 MG/ML; UG/ML
INJECTION, SOLUTION EPIDURAL; INTRACAUDAL; PERINEURAL PRN
Status: DISCONTINUED | OUTPATIENT
Start: 2021-02-14 | End: 2021-02-14 | Stop reason: HOSPADM

## 2021-02-14 RX ORDER — HYDROMORPHONE HYDROCHLORIDE 1 MG/ML
0.4 INJECTION, SOLUTION INTRAMUSCULAR; INTRAVENOUS; SUBCUTANEOUS
Status: DISCONTINUED | OUTPATIENT
Start: 2021-02-14 | End: 2021-02-16 | Stop reason: HOSPADM

## 2021-02-14 RX ORDER — OXYCODONE HYDROCHLORIDE 5 MG/1
5 TABLET ORAL EVERY 4 HOURS PRN
Status: DISCONTINUED | OUTPATIENT
Start: 2021-02-14 | End: 2021-02-15

## 2021-02-14 RX ORDER — ONDANSETRON 4 MG/1
4 TABLET, ORALLY DISINTEGRATING ORAL EVERY 30 MIN PRN
Status: DISCONTINUED | OUTPATIENT
Start: 2021-02-14 | End: 2021-02-14 | Stop reason: HOSPADM

## 2021-02-14 RX ORDER — MEPERIDINE HYDROCHLORIDE 25 MG/ML
12.5 INJECTION INTRAMUSCULAR; INTRAVENOUS; SUBCUTANEOUS
Status: DISCONTINUED | OUTPATIENT
Start: 2021-02-14 | End: 2021-02-14 | Stop reason: HOSPADM

## 2021-02-14 RX ORDER — CEFAZOLIN SODIUM 1 G/50ML
1 INJECTION, SOLUTION INTRAVENOUS EVERY 8 HOURS
Status: COMPLETED | OUTPATIENT
Start: 2021-02-14 | End: 2021-02-15

## 2021-02-14 RX ORDER — ONDANSETRON 2 MG/ML
INJECTION INTRAMUSCULAR; INTRAVENOUS PRN
Status: DISCONTINUED | OUTPATIENT
Start: 2021-02-14 | End: 2021-02-14

## 2021-02-14 RX ORDER — ACETAMINOPHEN 325 MG/1
650 TABLET ORAL EVERY 4 HOURS PRN
Status: DISCONTINUED | OUTPATIENT
Start: 2021-02-17 | End: 2021-02-14

## 2021-02-14 RX ORDER — ALBUTEROL SULFATE 0.83 MG/ML
2.5 SOLUTION RESPIRATORY (INHALATION) EVERY 4 HOURS PRN
Status: DISCONTINUED | OUTPATIENT
Start: 2021-02-14 | End: 2021-02-14 | Stop reason: HOSPADM

## 2021-02-14 RX ORDER — FENTANYL CITRATE 50 UG/ML
25 INJECTION, SOLUTION INTRAMUSCULAR; INTRAVENOUS
Status: DISCONTINUED | OUTPATIENT
Start: 2021-02-14 | End: 2021-02-14

## 2021-02-14 RX ORDER — LIDOCAINE 40 MG/G
CREAM TOPICAL
Status: DISCONTINUED | OUTPATIENT
Start: 2021-02-14 | End: 2021-02-16 | Stop reason: HOSPADM

## 2021-02-14 RX ORDER — PROCHLORPERAZINE MALEATE 5 MG
5 TABLET ORAL EVERY 6 HOURS PRN
Status: DISCONTINUED | OUTPATIENT
Start: 2021-02-14 | End: 2021-02-14

## 2021-02-14 RX ORDER — EPHEDRINE SULFATE 50 MG/ML
INJECTION, SOLUTION INTRAMUSCULAR; INTRAVENOUS; SUBCUTANEOUS PRN
Status: DISCONTINUED | OUTPATIENT
Start: 2021-02-14 | End: 2021-02-14

## 2021-02-14 RX ORDER — POLYETHYLENE GLYCOL 3350 17 G/17G
17 POWDER, FOR SOLUTION ORAL DAILY
Status: DISCONTINUED | OUTPATIENT
Start: 2021-02-15 | End: 2021-02-16 | Stop reason: HOSPADM

## 2021-02-14 RX ADMIN — OXYCODONE HYDROCHLORIDE 5 MG: 5 TABLET ORAL at 08:36

## 2021-02-14 RX ADMIN — SODIUM CHLORIDE, POTASSIUM CHLORIDE, SODIUM LACTATE AND CALCIUM CHLORIDE: 600; 310; 30; 20 INJECTION, SOLUTION INTRAVENOUS at 12:52

## 2021-02-14 RX ADMIN — Medication 15 MG: at 12:54

## 2021-02-14 RX ADMIN — DOCUSATE SODIUM 50 MG AND SENNOSIDES 8.6 MG 1 TABLET: 8.6; 5 TABLET, FILM COATED ORAL at 21:45

## 2021-02-14 RX ADMIN — TRANEXAMIC ACID 1 G: 10 INJECTION, SOLUTION INTRAVENOUS at 13:41

## 2021-02-14 RX ADMIN — ACETAMINOPHEN 975 MG: 325 TABLET, FILM COATED ORAL at 11:29

## 2021-02-14 RX ADMIN — PROCHLORPERAZINE EDISYLATE 5 MG: 5 INJECTION INTRAMUSCULAR; INTRAVENOUS at 21:45

## 2021-02-14 RX ADMIN — CEFAZOLIN SODIUM 2 G: 2 INJECTION, SOLUTION INTRAVENOUS at 12:42

## 2021-02-14 RX ADMIN — PHENYLEPHRINE HYDROCHLORIDE 200 MCG: 10 INJECTION INTRAVENOUS at 12:55

## 2021-02-14 RX ADMIN — TRANEXAMIC ACID 1 G: 10 INJECTION, SOLUTION INTRAVENOUS at 12:57

## 2021-02-14 RX ADMIN — SODIUM CHLORIDE, POTASSIUM CHLORIDE, SODIUM LACTATE AND CALCIUM CHLORIDE: 600; 310; 30; 20 INJECTION, SOLUTION INTRAVENOUS at 18:40

## 2021-02-14 RX ADMIN — KETOROLAC TROMETHAMINE 15 MG: 30 INJECTION, SOLUTION INTRAMUSCULAR at 11:29

## 2021-02-14 RX ADMIN — OXYCODONE HYDROCHLORIDE 5 MG: 5 TABLET ORAL at 01:18

## 2021-02-14 RX ADMIN — FENTANYL CITRATE 50 MCG: 50 INJECTION, SOLUTION INTRAMUSCULAR; INTRAVENOUS at 12:46

## 2021-02-14 RX ADMIN — ACETAMINOPHEN 975 MG: 325 TABLET, FILM COATED ORAL at 05:15

## 2021-02-14 RX ADMIN — ONDANSETRON 4 MG: 2 INJECTION INTRAMUSCULAR; INTRAVENOUS at 20:13

## 2021-02-14 RX ADMIN — ACETAMINOPHEN 975 MG: 325 TABLET, FILM COATED ORAL at 20:33

## 2021-02-14 RX ADMIN — LIDOCAINE HYDROCHLORIDE 50 MG: 10 INJECTION, SOLUTION INFILTRATION; PERINEURAL at 12:46

## 2021-02-14 RX ADMIN — PHENYLEPHRINE HYDROCHLORIDE 100 MCG: 10 INJECTION INTRAVENOUS at 12:51

## 2021-02-14 RX ADMIN — SODIUM CHLORIDE, POTASSIUM CHLORIDE, SODIUM LACTATE AND CALCIUM CHLORIDE: 600; 310; 30; 20 INJECTION, SOLUTION INTRAVENOUS at 12:42

## 2021-02-14 RX ADMIN — ONDANSETRON HYDROCHLORIDE 4 MG: 2 INJECTION, SOLUTION INTRAVENOUS at 13:38

## 2021-02-14 RX ADMIN — DEXAMETHASONE SODIUM PHOSPHATE 4 MG: 4 INJECTION, SOLUTION INTRA-ARTICULAR; INTRALESIONAL; INTRAMUSCULAR; INTRAVENOUS; SOFT TISSUE at 12:46

## 2021-02-14 RX ADMIN — CEFAZOLIN SODIUM 1 G: 1 INJECTION, SOLUTION INTRAVENOUS at 19:51

## 2021-02-14 RX ADMIN — Medication 2.5 MG: at 21:45

## 2021-02-14 RX ADMIN — ASPIRIN 325 MG: 325 TABLET, COATED ORAL at 20:33

## 2021-02-14 RX ADMIN — Medication 10 MG: at 13:01

## 2021-02-14 RX ADMIN — SODIUM CHLORIDE: 9 INJECTION, SOLUTION INTRAVENOUS at 07:44

## 2021-02-14 RX ADMIN — HYDROMORPHONE HYDROCHLORIDE 0.2 MG: 1 INJECTION, SOLUTION INTRAMUSCULAR; INTRAVENOUS; SUBCUTANEOUS at 19:51

## 2021-02-14 RX ADMIN — PROPOFOL 80 MG: 10 INJECTION, EMULSION INTRAVENOUS at 12:46

## 2021-02-14 RX ADMIN — Medication 10 MG: at 13:35

## 2021-02-14 RX ADMIN — DEXMEDETOMIDINE HYDROCHLORIDE 0.5 MCG/KG/HR: 100 INJECTION, SOLUTION INTRAVENOUS at 13:09

## 2021-02-14 RX ADMIN — Medication 5 MG: at 13:40

## 2021-02-14 RX ADMIN — PHENYLEPHRINE HYDROCHLORIDE 100 MCG: 10 INJECTION INTRAVENOUS at 13:05

## 2021-02-14 ASSESSMENT — ACTIVITIES OF DAILY LIVING (ADL)
ADLS_ACUITY_SCORE: 29
ADLS_ACUITY_SCORE: 29
ADLS_ACUITY_SCORE: 27
ADLS_ACUITY_SCORE: 29

## 2021-02-14 ASSESSMENT — ENCOUNTER SYMPTOMS: DYSRHYTHMIAS: 0

## 2021-02-14 NOTE — PLAN OF CARE
Patient vital signs are at baseline: Yes  Patient able to ambulate as they were prior to admission or with assist devices provided by therapies during their stay:  No,  Reason:  R femur fx - waiting for surgery  Patient MUST void prior to discharge:  NO - Sanchez catheter in place  Patient able to tolerate oral intake:  Yes  Pain has adequate pain control using Oral analgesics:  Yes      Pt A/O x 2, dementia, VSS, afebrile, on RA. LS clear, denies SOB. Telemetry SR 70-80's. Bedrest overnight. NPO pending surgery, denies nausea, NS @75mL/hr. Sanchez in place, adequate output. Sitter bedside d/t dementia. Pain managed w/Oxy 5mg and scheduled tylenol.

## 2021-02-14 NOTE — BRIEF OP NOTE
[facility]    Brief Operative Note    Pre-operative diagnosis: Hip fracture (H) [S72.009A]  Post-operative diagnosis Same as pre-operative diagnosis    Procedure: Procedure(s):  OPEN REDUCTION INTERNAL FIXATION, FRACTURE,RIGHT FEMUR, USING INTRAMEDULLARY HIRA AND FRACTURE TABLE  Surgeon: Surgeon(s) and Role:     * Nickolas Alan MD - Primary     * Mima Montaño PA-C - Assisting  Anesthesia: General   Estimated blood loss: 100 mL  Drains: None  Specimens: * No specimens in log *  Findings:   None.  Complications: None.  Implants:   Implant Name Type Inv. Item Serial No.  Lot No. LRB No. Used Action   IMP NAL SYN CAN FEM PROX TFNA [id number] 130D [id number] [employer] IMP NAL SYN CAN FEM PROX TFNA [id number] 130D [id number]  Whois [id number] Right 1 Implanted   SCR BONE FENESTRATED 95MML HELICAL BLADE STERILE Met[employer]ware/[employer] SCR BONE FENESTRATED 95MML HELICAL BLADE STERILE  [employer] [id number] Right 1 Implanted   IMP SCR SYN 5.0 TI LOCK T25 STARDRIVE 40MM [id number] [employer]ware/[employer] IMP SCR SYN 5.0 TI LOCK T25 STARDRIVE 40MM [id number]  Who[employer] [id number] Right 1 Implanted     Plan:  WBAT  DVT prophylaxis - SCDs & then  mg PO qday x 4 weeks  Ancef x 24 hours  PACU XRs  PT  Keep dressing C/D/I for 2 weeks; okay to shower    Follow up with Dr. Alan in clinic in 2 weeks.

## 2021-02-14 NOTE — CONSULTS
"NUTRITION ASSESSMENT      REASON FOR NUTRITION CONSULT:  Provider Order  - \"malnourished\".      ASSESSMENT:  Unable to complete nutrition assessment due to RD off-site.      FOLLOW UP:   Will follow up when on-site as able.      Franci Damian RDN, LD  Clinical Dietitian  3rd floor/ICU: [phone]  All other floors: [phone]  Weekend/holiday: [phone]  "

## 2021-02-14 NOTE — PLAN OF CARE
Patient confused, had to reorient mutliple times, patient still repeated questions. Gave 5 mg oxycodone for pain, patient was moaning. Sitter at bedside. Npo for surgery.

## 2021-02-14 NOTE — PROGRESS NOTES
[facility]    Hospitalist Progress Note    Date of Service (when I saw the patient): 02/14/2021  Provider:  Walt Brown MD   Text Page  7am - 6PM       Assessment & Plan   Jean M Topitzhofer is a 86 year old female who was admitted on 2/13/2021 after an unwitnessed mechanical fall in her MCU with right femur fracture.     Mechanical fall resulting in comminuted intra trochanteric fracture of the right femur  -orthopedic surgery consulted - plan for OR today. No additional cardiopulmonary testing indicated. RCRI: 0. 2/13 COVID-19 negative.   -Defer DVT prophylaxis to Ortho  -Pain control: Scheduled acetaminophen; prn oxycodone, acetaminophen, and morphine  -Fall precautions, CMS/neuro checks  -Tele monitoring - presumed mechanical fall but unwitnessed. EKG unchanged from 2018   -Hx of UTI - negative UA.     Acute anemia, 9.2 (14.6) likely blood loss d/t thigh hematoma 2/2 fracture. No need for transfusion at this point. Will follow up.     Dementia: Unknown baseline cognitive status. Complicates cares.      Hypertension: soft likely secondary to narcotics. Hold PTA triamterene-hydrochlorothiazide       Incidental findings:  -Rounded 15 mm right lung airspace opacity - changed from 12/2014 - CT chest recommended      -CT head: Disproportionate enlargement of the ventricular system compared to the degree of volume loss - consider NPH - unable to full assess additional symptoms - limited mobility with fracture and dementia at baseline. Monitor.      FEN: Oral hydration/NS 75 ml/hr; mag/phosphorus; regular/NPO   Activity: Bedrest - PT/OT following surgery   DVT Prophylaxis: Defer to primary service  Code Status: Full Code to be changed after surgery.     Disposition: Expected discharge in 2 - 3 days once surgeon in agreement.    Interval History   Not in pain during my visit, confused, non reliable.     -Data reviewed today: I reviewed all new labs and imaging results over the last 24 hours. I  personally reviewed the EKG tracing showing sinus with RBBB.    Physical Exam   Temp: 98.4  F (36.9  C) Temp src: Temporal BP: 118/61 Pulse: 66   Resp: 18 SpO2: 94 % O2 Device: None (Room air) Oxygen Delivery: 2 LPM  Vitals:    02/13/21 1803   Weight: 42.9 kg (94 lb 8 oz)     Vital Signs with Ranges  Temp:  [96.8  F (36  C)-98.9  F (37.2  C)] 98.4  F (36.9  C)  Pulse:  [51-79] 66  Resp:  [16-20] 18  BP: ()/(48-91) 118/61  SpO2:  [90 %-100 %] 94 %  No intake/output data recorded.    GEN:  Alert, appears comfortable, NAD.  HEENT:  Normocephalic/atraumatic, no scleral icterus, no nasal discharge, mouth moist.  CV:  Regular rate and rhythm, no murmur or JVD.  S1 + S2 noted, no S3 or S4.  LUNGS:  Clear to auscultation bilaterally without rales/rhonchi/wheezing/retractions.  Symmetric chest rise on inhalation noted.  ABD:  Active bowel sounds, soft, non-tender/non-distended.  No rebound/guarding/rigidity.  EXT:  R thigh bigger than left, foreshortening of RLE.  No edema or cyanosis.  No joint synovitis noted.  SKIN:  Dry to touch, no exanthems noted in the visualized areas.       Medications     lactated ringers       lactated ringers       ORAL Pain Medications - may administer as ordered by surgeon for take home use       sodium chloride 75 mL/hr at 02/14/21 0744       [Auto Hold] acetaminophen  975 mg Oral Q8H     ceFAZolin  1 g Intravenous See Admin Instructions     tranexamic acid  1 g Intravenous Once       Data   Recent Labs   Lab 02/14/21  0643 02/13/21  1419   WBC 13.2* 9.9   HGB 9.2* 14.6   * 101*    250    140   POTASSIUM 4.5 4.5   CHLORIDE 110* 108   CO2 25 27   BUN 24 13   CR 0.98 0.68   ANIONGAP 5 5   TOBY 8.1* 9.0   * 114*       Recent Results (from the past 24 hour(s))   CT Head w/o Contrast    Narrative    CT SCAN OF THE HEAD WITHOUT CONTRAST   2/13/2021 3:10 PM     HISTORY: Head trauma, mod-severe.    TECHNIQUE:  Axial images of the head and coronal reformations  without  IV contrast material. Radiation dose for this scan was reduced using  automated exposure control, adjustment of the mA and/or kV according  to patient size, or iterative reconstruction technique.    COMPARISON: None.    FINDINGS:  Disproportionate enlargement of the ventricular system  compared to the degree of bone loss. No evidence of acute ischemia or  hemorrhage. The visualized calvarium, tympanic cavities, and mastoid  cavities are unremarkable.      Impression    IMPRESSION:   1. No acute intracranial abnormality.  2. Disproportionate enlargement of the ventricular system compared to  the degree of volume loss which can be seen in the setting of normal  pressure hydrocephalus.    ILYA ALMONTE MD   Cervical spine CT w/o contrast    Narrative    CT CERVICAL SPINE WITHOUT CONTRAST   2/13/2021 3:12 PM     HISTORY: Neck trauma (Age >= 65y); neck pain, trauma.     TECHNIQUE: Axial images of the cervical spine were obtained without  intravenous contrast. Multiplanar reformations were performed.   Radiation dose for this scan was reduced using automated exposure  control, adjustment of the mA and/or kV according to patient size, or  iterative reconstruction technique.    COMPARISON: None.    FINDINGS: No evidence of acute fracture or posttraumatic subluxation.  Severe degenerative change. Multiple areas of spinal canal stenosis  including severe spinal canal stenosis at C3-C4 and C4-C5. Multilevel  severe foraminal stenoses. Scattered vascular calcifications. Multiple  nonspecific lung nodules, some of which are at least partially  calcified. Outpatient chest CT correlation could be performed.      Impression    IMPRESSION:   1. No evidence of acute fracture or subluxation in the cervical spine.  2. Severe degenerative change with multiple severe stenoses.    ILYA ALMONTE MD   XR Pelvis w Hip Right 1 View    Narrative    PELVIS AND RIGHT HIP, ONE VIEW  2/13/2021 3:25 PM     INDICATION: Right-sided hip  pain after trauma.     COMPARISON: None.      Impression    IMPRESSION:  1.  Comminuted intratrochanteric fracture of the right femur. There is  fracture impaction, varus angulation, and a small mildly displaced  lesser trochanteric fragment.  2.  Mild right hip degenerative arthrosis.  3.  Degenerative changes in the lower lumbar spine.  4.  Bone demineralization.    ANH ANN MD   XR Shoulder Right G/E 3 Views    Narrative    RIGHT SHOULDER THREE OR MORE VIEWS  2/13/2021 3:25 PM     INDICATION: Right-sided shoulder pain after an injury.     COMPARISON: None.      Impression    IMPRESSION:  1.  No fracture or joint malalignment.  2.  Mild right glenohumeral degenerative arthrosis.  3.  Bone demineralization.  4.  Rounded 15 mm right lung airspace opacity. This is new or more  conspicuous since 12/30/2014. Dedicated chest CT is recommended in  further evaluation.    ANH ANN MD   XR Elbow Right G/E 3 Views    Narrative    RIGHT ELBOW THREE OR MORE VIEWS  2/13/2021 3:26 PM     INDICATION: Right elbow pain after an injury.     COMPARISON: None.      Impression    IMPRESSION:  1.  Normal joint spacing and alignment.  2.  No fracture or joint effusion.  3.  Bone demineralization.    ANH ANN MD     Disclaimer: This note consists of symbols derived from keyboarding, dictation and/or voice recognition software. As a result, there may be errors in the script that have gone undetected. Please consider this when interpreting information found in this chart.

## 2021-02-14 NOTE — ANESTHESIA CARE TRANSFER NOTE
Patient: Jean M Topitzhofer    Procedure(s):  OPEN REDUCTION INTERNAL FIXATION, FRACTURE,RIGHT FEMUR, USING INTRAMEDULLARY HIRA AND FRACTURE TABLE    Diagnosis: Hip fracture (H) [S72.009A]  Diagnosis Additional Information: No value filed.    Anesthesia Type:   General     Note:    Oropharynx: oropharynx clear of all foreign objects and spontaneously breathing  Level of Consciousness: awake  Oxygen Supplementation: face mask    Independent Airway: airway patency satisfactory and stable  Dentition: dentition unchanged  Vital Signs Stable: post-procedure vital signs reviewed and stable  Report to RN Given: handoff report given  Patient transferred to: PACU  Comments: Report and signed off to RN in PACU.  Good Resps, skin pink, VSS, O2 via face tent.  Handoff Report: Identifed the Patient, Identified the Reponsible Provider, Reviewed the pertinent medical history, Discussed the surgical course, Reviewed Intra-OP anesthesia mangement and issues during anesthesia, Set expectations for post-procedure period and Allowed opportunity for questions and acknowledgement of understanding      Vitals: (Last set prior to Anesthesia Care Transfer)  CRNA VITALS  2/14/2021 1333 - 2/14/2021 1409      2/14/2021             NIBP:  144/73    NIBP Mean:  96        Electronically Signed By: KARLEY Mtz CRNA  February 14, 2021  2:09 PM

## 2021-02-14 NOTE — PHARMACY-ADMISSION MEDICATION HISTORY
Admission medication history interview status for this patient is complete. See [facility] admission navigator for allergy information, prior to admission medications and immunization status.     Medication history interview done via telephone during Covid-19 pandemic, indicate source(s): Caregiver - medication list from [facility] (Ph [phone])  Medication history resources (including written lists, pill bottles, clinic record): SureScripts and medication list from [facility]  Pharmacy: [facility] for discharge. Fills at [facility] in [place].     Changes made to PTA medication list:  Added: artificial tears  Deleted: brimonidine, restasis, fish oil, potassium chloride, triamterene-HCTZ  Changed: APAP 1000mg TID PRN --> 650mg TID PRN    Actions taken by pharmacist (provider contacted, etc): Verified home med list per [facility] records     Additional medication history information:None    Medication reconciliation/reorder completed by provider prior to medication history?  Y   (Y/N)     Prior to Admission medications    Medication Sig Last Dose Taking? Auth Provider   acetaminophen (TYLENOL) 325 MG tablet Take 650 mg by mouth every 8 hours as needed for mild pain Past Month at Unknown time Yes Unknown, Entered By History   calcium carbonate (ANTACID EXTRA STRENGTH) 750 MG CHEW Take 1,500 mg by mouth daily as needed Past Month at Unknown time Yes Unknown, Entered By History   dorzolamide-timolol (COSOPT) 2-0.5 % ophthalmic solution Place 1 drop into the right eye every morning  2/13/2021 at am Yes Reported, Patient   Emollient (CERAVE) CREA Externally apply topically daily To boy legs 2/13/2021 at am Yes Unknown, Entered By History   Eyelid Cleansers (AVENOVA EX) Externally apply 1 spray topically daily Spray to closed eyes covering eye lids 2/13/2021 at am Yes Reported, Patient   hypromellose (ARTIFICIAL TEARS) 0.5 % SOLN ophthalmic solution Place 2 drops into both eyes 3 times daily 2/13/2021 at am Yes Unknown,  Entered By History   latanoprost (XALATAN) 0.005 % ophthalmic solution Place 1 drop into both eyes At Bedtime  2/12/2021 at pm Yes Reported, Patient   Multiple Vitamins-Minerals (CENTRUM) TABS Take 1 tablet by mouth daily 2/13/2021 at am Yes Ilene Petersen MD   guaiFENesin (ROBITUSSIN) 100 MG/5ML SYRP Take 10 mLs by mouth every 4 hours as needed for cough More than a month at Unknown time  Unknown, Entered By History

## 2021-02-14 NOTE — CONSULTS
[facility]    Orthopedics Consultation    Date of Admission:  2/13/2021    Assessment & Plan   Jean M Topitzhofer is a 86 year old female who was admitted on 2/13/2021. I was asked to see the patient for right hip pain. She has a right pertrochanteric hip fracture and osteoporosis.     - To OR for right hip cephalomedullary nail if optimized per hospitalist team  - NPO since midnight  - TXA ordered  - Preop abx ordered  - Bedrest for now  - Place quevedo  - Pain control  -Hemoglobin 14.6, continue to monitor after surgery  - I spoke with Stefany Shelton, patients relative and she would like to move forward with surgical intervention rather than nonoperative treatment      Nickolas Alan MD    Code Status    Full Code    Reason for Consult   Reason for consult: Right hip pain    Primary Care Physician   [facility]    History of Present Illness   Jean M Topitzhofer is a 86 year old female who presents with a chief complaint of right hip pain.  She was seen in the emergency department after sustaining a ground-level fall at her assisted living memory care unit.  X-rays were taken of the right hip demonstrating a peritrochanteric hip fracture.  The patient normally ambulates with a walker at baseline.  She is not chronically anticoagulated.  Movement of the right hip causes significant pain but leaving it still helps improve the pain.  She is very difficult to communicate with due to her advanced dementia.    MEDS:   No current outpatient medications on file.       PAST MEDICAL HISTORY:   Past Medical History:   Diagnosis Date     Benign essential hypertension      Dementia (H)        PAST SURGICAL HISTORY:   Past Surgical History:   Procedure Laterality Date     ABDOMEN SURGERY  1980s    removal of benign fibrous tumor       FAMILY HISTORY:   Family History   Problem Relation Age of Onset     Diabetes Maternal Grandmother      Hypertension Mother      Aneurysm Son         SOCIAL HISTORY:   Social History     Tobacco Use     Smoking status: Never Smoker     Smokeless tobacco: Never Used   Substance Use Topics     Alcohol use: No       ALLERGIES:  No Known Allergies    ROS:  10 point ROS neg other than the symptoms noted above in the HPI.    Physical Exam   Temp: 98.9  F (37.2  C) Temp src: Temporal BP: 91/48 Pulse: 66   Resp: 20 SpO2: 94 % O2 Device: None (Room air) Oxygen Delivery: 2 LPM  Vital Signs with Ranges  Temp:  [96.8  F (36  C)-98.9  F (37.2  C)] 98.9  F (37.2  C)  Pulse:  [51-79] 66  Resp:  [16-20] 20  BP: ()/(48-91) 91/48  SpO2:  [90 %-100 %] 94 %  94 lbs 8 oz    Constitutional: Pleasant, alert, appropriate, following commands.  HEENT: Head atraumatic normocephalic. Pupils equal round and reactive to light.  Respiratory: Unlabored breathing no audible wheeze  Cardiovascular: Regular rate and rhythm  GI: Abdomen soft nontender nondistended.  Lymph/Hematologic: No lymphadenopathy in areas examined  Genitourinary:  No quevedo  Skin: No rashes, no cyanosis, no edema.  Musculoskeletal: Focused examination of the right lower extremity demonstrates toes that move up and down.  I am unable to get her to cooperate with the remainder of the exam including the sensory exam.  She does have a 1+ DP pulse and capillary refill less than 2 seconds.  She has pain with logroll and I am unable to assess knee pain or range of motion due to her hip pain.  She has tenderness over the greater trochanter.  Neurologic: CANDIDO, reflexes normal and symmetric      Data   Results for orders placed or performed during the hospital encounter of 02/13/21 (from the past 24 hour(s))   EKG 12-lead, tracing only   Result Value Ref Range    Interpretation ECG Click View Image link to view waveform and result    Basic metabolic panel   Result Value Ref Range    Sodium 140 133 - 144 mmol/L    Potassium 4.5 3.4 - 5.3 mmol/L    Chloride 108 94 - 109 mmol/L    Carbon Dioxide 27 20 - 32 mmol/L    Anion Gap  5 3 - 14 mmol/L    Glucose 114 (H) 70 - 99 mg/dL    Urea Nitrogen 13 7 - 30 mg/dL    Creatinine 0.68 0.52 - 1.04 mg/dL    GFR Estimate 79 >60 mL/min/[1.73_m2]    GFR Estimate If Black >90 >60 mL/min/[1.73_m2]    Calcium 9.0 8.5 - 10.1 mg/dL   CBC with platelets differential   Result Value Ref Range    WBC 9.9 4.0 - 11.0 10e9/L    RBC Count 4.76 3.8 - 5.2 10e12/L    Hemoglobin 14.6 11.7 - 15.7 g/dL    Hematocrit 48.1 (H) 35.0 - 47.0 %     (H) 78 - 100 fl    MCH 30.7 26.5 - 33.0 pg    MCHC 30.4 (L) 31.5 - 36.5 g/dL    RDW 13.6 10.0 - 15.0 %    Platelet Count 250 150 - 450 10e9/L    Diff Method Automated Method     % Neutrophils 71.4 %    % Lymphocytes 11.6 %    % Monocytes 8.7 %    % Eosinophils 7.0 %    % Basophils 0.6 %    % Immature Granulocytes 0.7 %    Nucleated RBCs 0 0 /100    Absolute Neutrophil 7.1 1.6 - 8.3 10e9/L    Absolute Lymphocytes 1.2 0.8 - 5.3 10e9/L    Absolute Monocytes 0.9 0.0 - 1.3 10e9/L    Absolute Eosinophils 0.7 0.0 - 0.7 10e9/L    Absolute Basophils 0.1 0.0 - 0.2 10e9/L    Abs Immature Granulocytes 0.1 0 - 0.4 10e9/L    Absolute Nucleated RBC 0.0    Magnesium   Result Value Ref Range    Magnesium 2.0 1.6 - 2.3 mg/dL   Phosphorus   Result Value Ref Range    Phosphorus 4.2 2.5 - 4.5 mg/dL   CT Head w/o Contrast    Narrative    CT SCAN OF THE HEAD WITHOUT CONTRAST   2/13/2021 3:10 PM     HISTORY: Head trauma, mod-severe.    TECHNIQUE:  Axial images of the head and coronal reformations without  IV contrast material. Radiation dose for this scan was reduced using  automated exposure control, adjustment of the mA and/or kV according  to patient size, or iterative reconstruction technique.    COMPARISON: None.    FINDINGS:  Disproportionate enlargement of the ventricular system  compared to the degree of bone loss. No evidence of acute ischemia or  hemorrhage. The visualized calvarium, tympanic cavities, and mastoid  cavities are unremarkable.      Impression    IMPRESSION:   1. No acute  intracranial abnormality.  2. Disproportionate enlargement of the ventricular system compared to  the degree of volume loss which can be seen in the setting of normal  pressure hydrocephalus.    ILYA ALMONTE MD   Cervical spine CT w/o contrast    Narrative    CT CERVICAL SPINE WITHOUT CONTRAST   2/13/2021 3:12 PM     HISTORY: Neck trauma (Age >= 65y); neck pain, trauma.     TECHNIQUE: Axial images of the cervical spine were obtained without  intravenous contrast. Multiplanar reformations were performed.   Radiation dose for this scan was reduced using automated exposure  control, adjustment of the mA and/or kV according to patient size, or  iterative reconstruction technique.    COMPARISON: None.    FINDINGS: No evidence of acute fracture or posttraumatic subluxation.  Severe degenerative change. Multiple areas of spinal canal stenosis  including severe spinal canal stenosis at C3-C4 and C4-C5. Multilevel  severe foraminal stenoses. Scattered vascular calcifications. Multiple  nonspecific lung nodules, some of which are at least partially  calcified. Outpatient chest CT correlation could be performed.      Impression    IMPRESSION:   1. No evidence of acute fracture or subluxation in the cervical spine.  2. Severe degenerative change with multiple severe stenoses.    ILYA ALMONTE MD   XR Pelvis w Hip Right 1 View    Narrative    PELVIS AND RIGHT HIP, ONE VIEW  2/13/2021 3:25 PM     INDICATION: Right-sided hip pain after trauma.     COMPARISON: None.      Impression    IMPRESSION:  1.  Comminuted intratrochanteric fracture of the right femur. There is  fracture impaction, varus angulation, and a small mildly displaced  lesser trochanteric fragment.  2.  Mild right hip degenerative arthrosis.  3.  Degenerative changes in the lower lumbar spine.  4.  Bone demineralization.    ANH ANN MD   XR Shoulder Right G/E 3 Views    Narrative    RIGHT SHOULDER THREE OR MORE VIEWS  2/13/2021 3:25 PM     INDICATION:  "Right-sided shoulder pain after an injury.     COMPARISON: None.      Impression    IMPRESSION:  1.  No fracture or joint malalignment.  2.  Mild right glenohumeral degenerative arthrosis.  3.  Bone demineralization.  4.  Rounded 15 mm right lung airspace opacity. This is new or more  conspicuous since 12/30/2014. Dedicated chest CT is recommended in  further evaluation.    ANH ANN MD   XR Elbow Right G/E 3 Views    Narrative    RIGHT ELBOW THREE OR MORE VIEWS  2/13/2021 3:26 PM     INDICATION: Right elbow pain after an injury.     COMPARISON: None.      Impression    IMPRESSION:  1.  Normal joint spacing and alignment.  2.  No fracture or joint effusion.  3.  Bone demineralization.    ANH ANN MD   Asymptomatic SARS-CoV-2 COVID-19 Virus (Coronavirus) by PCR    Specimen: Nasopharyngeal   Result Value Ref Range    SARS-CoV-2 Virus Specimen Source Nasopharyngeal     SARS-CoV-2 PCR Result NEGATIVE     SARS-CoV-2 PCR Comment (Note)    Nutrition Services Adult IP Consult    Narrative    Franci Damian RD, LD     2/14/2021  7:50 AM  NUTRITION ASSESSMENT      REASON FOR NUTRITION CONSULT:  Provider Order  - \"malnourished\".      ASSESSMENT:  Unable to complete nutrition assessment due to RD off-site.      FOLLOW UP:   Will follow up when on-site as able.      Franci Damian RDN, LD  Clinical Dietitian  3rd floor/ICU: [phone]  All other floors: [phone]  Weekend/holiday: [phone]   UA with Microscopic reflex to Culture    Specimen: Unspecified Urine   Result Value Ref Range    Color Urine Yellow     Appearance Urine Clear     Glucose Urine Negative NEG^Negative mg/dL    Bilirubin Urine Negative NEG^Negative    Ketones Urine Negative NEG^Negative mg/dL    Specific Gravity Urine 1.018 1.003 - 1.035    Blood Urine Negative NEG^Negative    pH Urine 6.0 5.0 - 7.0 pH    Protein Albumin Urine Negative NEG^Negative mg/dL    Urobilinogen mg/dL Normal 0.0 - 2.0 mg/dL    Nitrite Urine Negative " NEG^Negative    Leukocyte Esterase Urine Negative NEG^Negative    Source Unspecified Urine     WBC Urine 1 0 - 5 /HPF    RBC Urine 9 (H) 0 - 2 /HPF   Basic metabolic panel   Result Value Ref Range    Sodium 140 133 - 144 mmol/L    Potassium 4.5 3.4 - 5.3 mmol/L    Chloride 110 (H) 94 - 109 mmol/L    Carbon Dioxide 25 20 - 32 mmol/L    Anion Gap 5 3 - 14 mmol/L    Glucose 109 (H) 70 - 99 mg/dL    Urea Nitrogen 24 7 - 30 mg/dL    Creatinine 0.98 0.52 - 1.04 mg/dL    GFR Estimate 52 (L) >60 mL/min/[1.73_m2]    GFR Estimate If Black 61 >60 mL/min/[1.73_m2]    Calcium 8.1 (L) 8.5 - 10.1 mg/dL   CBC with platelets   Result Value Ref Range    WBC 13.2 (H) 4.0 - 11.0 10e9/L    RBC Count 2.97 (L) 3.8 - 5.2 10e12/L    Hemoglobin 9.2 (L) 11.7 - 15.7 g/dL    Hematocrit 30.2 (L) 35.0 - 47.0 %     (H) 78 - 100 fl    MCH 31.0 26.5 - 33.0 pg    MCHC 30.5 (L) 31.5 - 36.5 g/dL    RDW 14.0 10.0 - 15.0 %    Platelet Count 168 150 - 450 10e9/L

## 2021-02-14 NOTE — PLAN OF CARE
PM Shift  Arrived from ED at 1745. Oriented to self only. VSS. On remote tele, NSR. Pt confused and forgetful. Made multiple attempts to climb out of bed, sitter placed at bedside. Sanchez placed for urinary retention. Gave IV compazine for nausea. Pain managed with tylenol and oxycodone. NPO past midnight. Chlorhexidine bath done. Surgery scheduled tomorrow at 1745.

## 2021-02-14 NOTE — OP NOTE
[facility]  Operative Note  Cephalomedullary Nailing      Jean M Topitzhofer MRN# [id number]   YOB: 1934  Procedure Date:  2/14/2021  Age: 86 year old     PREOPERATIVE DIAGNOSIS:     1.  Displaced right peritrochanteric femur fracture.   2.  Osteoporosis.      POSTOPERATIVE DIAGNOSES:     1.  Displaced right peritrochanteric femur fracture.   2.  Osteoporosis.      PROCEDURE PERFORMED:  right hip cephalomedullary nailing, femur fracture.      SURGEON:  Nickolas Alan MD     ASSISTANT: Puja Montaño PA-C  who was critical for positioning patient, helping obtain reduction, retraction during exposure and implant placement, as well as closure.     ANESTHESIA:  General.      ESTIMATED BLOOD LOSS:   100 mL      IMPLANTS USED:  Synthes TFN advanced nail short 130 degrees, cephalomedullary blade (95 mm), 40 mm interlock screw     FINDINGS:  Jean M Topitzhofer is a 86 year old-year-old female who did do some ambulation prior to this with above-named injury.  A long discussion had regarding the nature of hip fractures, risks related to that and surgery discussed, included but not limited to bleeding, infection, damage to surrounding neurovascular structures, malunion, delayed union, nonunion, need for additional surgeries, blood clots that go to the heart, lung or brain, anesthetic complications or even death.  Discussed hip fracture mortality rates.  The patient and the family wished to proceed and consent signed.      DESCRIPTION OF PROCEDURE:  The patient identified in the preoperative holding area per hospital policy and the correct op site marked, to the OR table, Ancef given.  General anesthesia induced, placed onto the [place] table legs positioned in a scissor position.  All bony prominences well padded.  Slight traction and internal rotation reduced the fracture.  ChloraPrep, standard drape.  A timeout was performed.  All in the room agreed.         A small incision was made just proximal to  greater trochanter and dissected down the greater trochanter, placed a guide pin confirming position AP and lateral views.  Opening reamer. Placed the preoperatively templated short 130 degree cephalomedullary nail.  Placed the guide wire into the center-center position in the femoral head and reamed, measured and placed the blade.  Checked AP, lateral and 30-degree arcs in between to ensure safe position, as well as appropriate reduction.  Compressed.  Locked the interference screw proximally.  Outrigger removed.  Distal interlock placed.  Final x-rays showed appropriate reduction, safe position of the implants. Thoroughly irrigated wounds, closed deep layers, 0 Vicryl, 2-0 Vicryl in subcutaneous, staples for skin.  Dressings were then applied.      POSTOPERATIVE PLAN:   1.  Weightbearing as tolerated with a walker x6 weeks.   2.  Deep venous thrombosis prophylaxis with aspirin enteric-coated 325 mg daily x6 weeks.   3.  Ancef x 24 hours  4.  Osteoporosis workup including calcium, vitamin D supplementation and followup with primary care doctor for additional treatment.   5.  Physical therapy.   6.  Pain control.  Minimize narcotics.      POSTOPERATIVE PLAN:  At two weeks, the dressing could be removed. Staples out at 2-3 weeks.  If all is going well, first followup could be at 2 or 6 weeks. She will likely be in a rehab facility and she can have her staples taken out there. We can do portable x-rays at 6 weeks. She can be seen by our nurse practitioner in her TCU.

## 2021-02-14 NOTE — ANESTHESIA PREPROCEDURE EVALUATION
Anesthesia Pre-Procedure Evaluation    Patient: Jean M Topitzhofer   MRN: [id number] : 1934        Preoperative Diagnosis: Hip fracture (H) [S72.009A]   Procedure : Procedure(s):  OPEN REDUCTION INTERNAL FIXATION, FRACTURE, FEMUR, USING INTRAMEDULLARY HIRA AND FRACTURE TABLE     Past Medical History:   Diagnosis Date     Benign essential hypertension      Dementia (H)       Past Surgical History:   Procedure Laterality Date     ABDOMEN SURGERY  1980s    removal of benign fibrous tumor      No Known Allergies   Social History     Tobacco Use     Smoking status: Never Smoker     Smokeless tobacco: Never Used   Substance Use Topics     Alcohol use: No      Wt Readings from Last 1 Encounters:   21 42.9 kg (94 lb 8 oz)        Anesthesia Evaluation   Pt has had prior anesthetic. Type: General.    No history of anesthetic complications       ROS/MED HX  ENT/Pulmonary:  - neg pulmonary ROS  (-) asthma   Neurologic:     (+) dementia ( severe),     Cardiovascular:     (+) hypertension-----Previous cardiac testing   Echo: Date:  Results:  Left Ventricle  There is significant vzdy-hq-jcek variation in the LV systolic function. The   estimated LVEF is 50-55%. Regional wall motion cannot reliably be assessed.  Left ventricular diastolic function cannot be assessed.  The right ventricle is normal size.  Global right ventricular function is normal.  Both atria appear normal.  The mitral valve is normal.  Trace mitral insufficiency is present.  Aortic valve is normal in structure and function.  The tricuspid valve is normal.  The pulmonic valve is normal.  Trace pulmonic insufficiency is present.  Stress Test: Date: Results:    ECG Reviewed: Date: Results:    Cath: Date: Results:   (-) CAD, taking anticoagulants/antiplatelets and arrhythmias   METS/Exercise Tolerance: 3 - Able to walk 1-2 blocks without stopping    Hematologic:  - neg hematologic  ROS     Musculoskeletal:   (+) arthritis,     GI/Hepatic:  - neg  GI/hepatic ROS     Renal/Genitourinary:  - neg Renal ROS     Endo:  - neg endo ROS     Psychiatric/Substance Use:  - neg psychiatric ROS     Infectious Disease:  - neg infectious disease ROS     Malignancy:       Other:            Physical Exam    Airway   unable to assess          Respiratory Devices and Support         Dental    unable to assess        Cardiovascular   cardiovascular exam normal          Pulmonary   pulmonary exam normal                OUTSIDE LABS:  CBC:   Lab Results   Component Value Date    WBC 13.2 (H) 02/14/2021    WBC 9.9 02/13/2021    HGB 9.2 (L) 02/14/2021    HGB 14.6 02/13/2021    HCT 30.2 (L) 02/14/2021    HCT 48.1 (H) 02/13/2021     02/14/2021     02/13/2021     BMP:   Lab Results   Component Value Date     02/14/2021     02/13/2021    POTASSIUM 4.5 02/14/2021    POTASSIUM 4.5 02/13/2021    CHLORIDE 110 (H) 02/14/2021    CHLORIDE 108 02/13/2021    CO2 25 02/14/2021    CO2 27 02/13/2021    BUN 24 02/14/2021    BUN 13 02/13/2021    CR 0.98 02/14/2021    CR 0.68 02/13/2021     (H) 02/14/2021     (H) 02/13/2021     COAGS: No results found for: PTT, INR, FIBR  POC: No results found for: BGM, HCG, HCGS  HEPATIC:   Lab Results   Component Value Date    ALBUMIN 3.0 (L) 08/11/2018    PROTTOTAL 7.2 08/11/2018    ALT 40 08/11/2018    AST 80 (H) 08/11/2018    ALKPHOS 88 08/11/2018    BILITOTAL 0.8 08/11/2018     OTHER:   Lab Results   Component Value Date    LACT 0.9 08/11/2018    TOBY 8.1 (L) 02/14/2021    PHOS 4.2 02/13/2021    MAG 2.0 02/13/2021    TSH 0.89 01/01/2015    CRP 30.0 (H) 01/05/2015       Anesthesia Plan    ASA Status:  2   NPO Status:  NPO Appropriate    Anesthesia Type: General.     - Airway: LMA   Induction: Intravenous.   Maintenance: Balanced.   Techniques and Equipment:       - Drips/Meds: Dexmed. infusion     Consents    Anesthesia Plan(s) and associated risks, benefits, and realistic alternatives discussed. Questions answered and  patient/representative(s) expressed understanding.     - Discussed with:  Healthcare Power of       - Extended Intubation/Ventilatory Support Discussed: no Extended Intubation.      - Patient is DNR/DNI Status: Yes             Suspend during perioperative period? no DNR/DNI SUSPEND (Spoke to Stefany (one decision maker) who reported her and the other decision makers have made her DNR/DNI.  She understands that she may be intubated for the case and would be ok with a short course of post op intubation PRN. No CRP or defib, meds ok).    Use of blood products discussed: No .     Postoperative Care    Pain management: IV analgesics.   PONV prophylaxis: Ondansetron (or other 5HT-3), Dexamethasone or Solumedrol     Comments:                Jak Stiles MD

## 2021-02-14 NOTE — ANESTHESIA POSTPROCEDURE EVALUATION
Patient: Jean M Topitzhofer    Procedure(s):  OPEN REDUCTION INTERNAL FIXATION, FRACTURE,RIGHT FEMUR, USING INTRAMEDULLARY HIRA AND FRACTURE TABLE    Diagnosis:Hip fracture (H) [S72.009A]  Diagnosis Additional Information: No value filed.    Anesthesia Type:  General    Note:  Disposition: Inpatient   Postop Pain Control: Uneventful            Sign Out: Well controlled pain   PONV: No   Neuro/Psych: Uneventful            Sign Out: Acceptable/Baseline neuro status   Airway/Respiratory: Uneventful            Sign Out: Acceptable/Baseline resp. status   CV/Hemodynamics: Uneventful            Sign Out: Acceptable CV status   Other NRE: NONE   DID A NON-ROUTINE EVENT OCCUR? No         Last vitals:  Vitals:    02/14/21 1127 02/14/21 1409 02/14/21 1415   BP: 118/61 (!) 145/75 137/62   Pulse:  74 70   Resp: 18 (!) 6 (!) 7   Temp: 98.4  F (36.9  C) 97.8  F (36.6  C)    SpO2: 94% 100% 100%       Last vitals prior to Anesthesia Care Transfer:  CRNA VITALS  2/14/2021 1333 - 2/14/2021 1432      2/14/2021             NIBP:  144/73    NIBP Mean:  96          Electronically Signed By: Jak Stiles MD  February 14, 2021  2:32 PM

## 2021-02-15 ENCOUNTER — APPOINTMENT (OUTPATIENT)
Dept: PHYSICAL THERAPY | Facility: CLINIC | Age: 86
DRG: 480 | End: 2021-02-15
Attending: PHYSICIAN ASSISTANT
Payer: MEDICARE

## 2021-02-15 LAB
ABO + RH BLD: NORMAL
ABO + RH BLD: NORMAL
BLD GP AB SCN SERPL QL: NORMAL
BLD PROD TYP BPU: NORMAL
BLD UNIT ID BPU: 0
BLD UNIT ID BPU: 0
BLOOD BANK CMNT PATIENT-IMP: NORMAL
BLOOD PRODUCT CODE: NORMAL
BLOOD PRODUCT CODE: NORMAL
BPU ID: NORMAL
BPU ID: NORMAL
GLUCOSE SERPL-MCNC: 115 MG/DL (ref 70–99)
HGB BLD-MCNC: 6.8 G/DL (ref 11.7–15.7)
HGB BLD-MCNC: 9.8 G/DL (ref 11.7–15.7)
INTERPRETATION ECG - MUSE: NORMAL
NUM BPU REQUESTED: 2
SPECIMEN EXP DATE BLD: NORMAL
TRANSFUSION STATUS PATIENT QL: NORMAL

## 2021-02-15 PROCEDURE — 999N000111 HC STATISTIC OT IP EVAL DEFER: Performed by: OCCUPATIONAL THERAPIST

## 2021-02-15 PROCEDURE — 250N000013 HC RX MED GY IP 250 OP 250 PS 637: Performed by: INTERNAL MEDICINE

## 2021-02-15 PROCEDURE — 86900 BLOOD TYPING SEROLOGIC ABO: CPT | Performed by: HOSPITALIST

## 2021-02-15 PROCEDURE — 250N000013 HC RX MED GY IP 250 OP 250 PS 637: Performed by: PHYSICIAN ASSISTANT

## 2021-02-15 PROCEDURE — 85018 HEMOGLOBIN: CPT | Performed by: HOSPITALIST

## 2021-02-15 PROCEDURE — 120N000001 HC R&B MED SURG/OB

## 2021-02-15 PROCEDURE — P9016 RBC LEUKOCYTES REDUCED: HCPCS | Performed by: HOSPITALIST

## 2021-02-15 PROCEDURE — 82306 VITAMIN D 25 HYDROXY: CPT | Performed by: PHYSICIAN ASSISTANT

## 2021-02-15 PROCEDURE — 97530 THERAPEUTIC ACTIVITIES: CPT | Mod: GP | Performed by: PHYSICAL THERAPIST

## 2021-02-15 PROCEDURE — 86923 COMPATIBILITY TEST ELECTRIC: CPT | Performed by: HOSPITALIST

## 2021-02-15 PROCEDURE — 85018 HEMOGLOBIN: CPT | Performed by: PHYSICIAN ASSISTANT

## 2021-02-15 PROCEDURE — 99232 SBSQ HOSP IP/OBS MODERATE 35: CPT | Performed by: HOSPITALIST

## 2021-02-15 PROCEDURE — 36415 COLL VENOUS BLD VENIPUNCTURE: CPT | Performed by: HOSPITALIST

## 2021-02-15 PROCEDURE — 86850 RBC ANTIBODY SCREEN: CPT | Performed by: HOSPITALIST

## 2021-02-15 PROCEDURE — 97162 PT EVAL MOD COMPLEX 30 MIN: CPT | Mod: GP | Performed by: PHYSICAL THERAPIST

## 2021-02-15 PROCEDURE — 36415 COLL VENOUS BLD VENIPUNCTURE: CPT | Performed by: PHYSICIAN ASSISTANT

## 2021-02-15 PROCEDURE — 99207 PR CDG-CODE CATEGORY CHANGED: CPT | Performed by: HOSPITALIST

## 2021-02-15 PROCEDURE — 86901 BLOOD TYPING SEROLOGIC RH(D): CPT | Performed by: HOSPITALIST

## 2021-02-15 PROCEDURE — 250N000011 HC RX IP 250 OP 636: Performed by: PHYSICIAN ASSISTANT

## 2021-02-15 PROCEDURE — 82947 ASSAY GLUCOSE BLOOD QUANT: CPT | Performed by: PHYSICIAN ASSISTANT

## 2021-02-15 PROCEDURE — 258N000003 HC RX IP 258 OP 636: Performed by: PHYSICIAN ASSISTANT

## 2021-02-15 RX ORDER — VITAMIN B COMPLEX
50 TABLET ORAL DAILY
Status: DISCONTINUED | OUTPATIENT
Start: 2021-02-15 | End: 2021-02-16 | Stop reason: HOSPADM

## 2021-02-15 RX ORDER — ASPIRIN 325 MG
325 TABLET, DELAYED RELEASE (ENTERIC COATED) ORAL DAILY
Qty: 30 TABLET | Refills: 0 | DISCHARGE
Start: 2021-02-15

## 2021-02-15 RX ORDER — POLYETHYLENE GLYCOL 3350 17 G/17G
17 POWDER, FOR SOLUTION ORAL DAILY PRN
DISCHARGE
Start: 2021-02-15

## 2021-02-15 RX ORDER — TRAMADOL HYDROCHLORIDE 50 MG/1
50 TABLET ORAL EVERY 6 HOURS PRN
Status: DISCONTINUED | OUTPATIENT
Start: 2021-02-15 | End: 2021-02-16 | Stop reason: HOSPADM

## 2021-02-15 RX ORDER — OXYCODONE HYDROCHLORIDE 5 MG/1
2.5 TABLET ORAL
Qty: 30 TABLET | Refills: 0 | Status: SHIPPED | OUTPATIENT
Start: 2021-02-15 | End: 2021-02-16

## 2021-02-15 RX ADMIN — TRAMADOL HYDROCHLORIDE 50 MG: 50 TABLET, FILM COATED ORAL at 17:24

## 2021-02-15 RX ADMIN — Medication 50 MCG: at 12:04

## 2021-02-15 RX ADMIN — ACETAMINOPHEN 975 MG: 325 TABLET, FILM COATED ORAL at 20:49

## 2021-02-15 RX ADMIN — ASPIRIN 325 MG: 325 TABLET, COATED ORAL at 12:05

## 2021-02-15 RX ADMIN — DOCUSATE SODIUM 50 MG AND SENNOSIDES 8.6 MG 1 TABLET: 8.6; 5 TABLET, FILM COATED ORAL at 12:04

## 2021-02-15 RX ADMIN — DOCUSATE SODIUM 100 MG: 100 CAPSULE, LIQUID FILLED ORAL at 20:49

## 2021-02-15 RX ADMIN — ONDANSETRON 4 MG: 4 TABLET, ORALLY DISINTEGRATING ORAL at 11:42

## 2021-02-15 RX ADMIN — CEFAZOLIN SODIUM 1 G: 1 INJECTION, SOLUTION INTRAVENOUS at 03:58

## 2021-02-15 RX ADMIN — DOCUSATE SODIUM 50 MG AND SENNOSIDES 8.6 MG 1 TABLET: 8.6; 5 TABLET, FILM COATED ORAL at 20:49

## 2021-02-15 RX ADMIN — SODIUM CHLORIDE, POTASSIUM CHLORIDE, SODIUM LACTATE AND CALCIUM CHLORIDE: 600; 310; 30; 20 INJECTION, SOLUTION INTRAVENOUS at 10:03

## 2021-02-15 RX ADMIN — ACETAMINOPHEN 975 MG: 325 TABLET, FILM COATED ORAL at 12:04

## 2021-02-15 RX ADMIN — DOCUSATE SODIUM 100 MG: 100 CAPSULE, LIQUID FILLED ORAL at 12:05

## 2021-02-15 ASSESSMENT — ACTIVITIES OF DAILY LIVING (ADL)
ADLS_ACUITY_SCORE: 27

## 2021-02-15 NOTE — CONSULTS
"CLINICAL NUTRITION SERVICES  -  ASSESSMENT NOTE      MALNUTRITION:  % Weight Loss: Unable to determine without recent wt trending available and patient herself cannot report  % Intake:  </= 75% for >/= 1 month (severe malnutrition) --> based on BMI  Subcutaneous Fat Loss:  Orbital region moderate depletion and Upper arm region moderate to severe depletion  Muscle Loss:  Temporal region moderate depletion, Clavicle bone region moderate depletion, Acromion bone region moderate depletion, Patellar region moderate to severe depletion, Anterior thigh region moderate to severe depletion and Posterior calf region moderate to severe depletion  Fluid Retention: None documented    Malnutrition Diagnosis: Severe malnutrition  In Context of:  Acute illness or injury with underlying chronic illness or disease        REASON FOR ASSESSMENT  Jean M Topitzhofer is a 86 year old female seen by Registered Dietitian for Provider Order - \"malnourished\".    PMH of: Dementia.    Admit 2/2: Fall resulting in R femur fracture.    NUTRITION HISTORY  - Information obtained from chart as patient oriented to self at baseline.  - Resides in memory care unit.  - \"Regular diet as tolerated\" documented in paper chart.  - Wears full upper/lower dentures and is edentulous.  - Allergies: NKFA.      CURRENT NUTRITION ORDERS  Diet Order:     Regular    Current Intake/Tolerance:  Coughing with meds/thickened liquids yesterday and an emesis episode?  Per discussion with current RN staff had decided to thicken liquids though diet is thins.  Has not been alert enough to take any PO this AM, including breakfast.  Only documentation since admit is 25% of 1 meal.      NUTRITION FOCUSED PHYSICAL ASSESSMENT FOR DIAGNOSING MALNUTRITION)  Yes     Obtained from Chart/Interdisciplinary Team:  - ORIF 2/14  - No documentation of PI  - Stooling patterns reviewed    ANTHROPOMETRICS  Height: 5' 3\"  Weight: 94 lbs 8 oz  Body mass index is 16.74 kg/m .  Weight Status:  " Underweight BMI <18.5  Weight History:  Wt Readings from Last 10 Encounters:   02/13/21 42.9 kg (94 lb 8 oz)   08/11/18 49 kg (108 lb)   02/12/16 48.5 kg (107 lb)   12/24/15 50.3 kg (111 lb)   06/19/15 47.6 kg (105 lb)   06/17/15 47.5 kg (104 lb 12.8 oz)   06/15/15 47.2 kg (104 lb)   01/30/15 47.4 kg (104 lb 6.4 oz)   01/12/15 48.6 kg (107 lb 1.6 oz)   01/08/15 48.3 kg (106 lb 6.4 oz)     - No recent wt trending available (care everywhere reviewed).     LABS  Labs reviewed:  Electrolytes  Potassium (mmol/L)   Date Value   02/14/2021 4.5   02/13/2021 4.5   08/12/2018 3.7     Phosphorus (mg/dL)   Date Value   02/13/2021 4.2    Blood Glucose  Glucose (mg/dL)   Date Value   02/15/2021 115 (H)   02/14/2021 109 (H)   02/13/2021 114 (H)   08/12/2018 79   08/12/2018 Canceled, Test credited    Inflammatory Markers  CRP Inflammation (mg/L)   Date Value   01/05/2015 30.0 (H)   01/04/2015 40.0 (H)   01/02/2015 68.0 (H)     WBC (10e9/L)   Date Value   02/14/2021 13.2 (H)   02/13/2021 9.9   08/11/2018 8.5     Albumin (g/dL)   Date Value   08/11/2018 3.0 (L)   01/04/2015 2.2 (L)   12/30/2014 2.7 (L)      Magnesium (mg/dL)   Date Value   02/13/2021 2.0   08/11/2018 1.7   01/03/2015 1.7     Sodium (mmol/L)   Date Value   02/14/2021 140   02/13/2021 140   08/12/2018 141    Renal  Urea Nitrogen (mg/dL)   Date Value   02/14/2021 24   02/13/2021 13   08/12/2018 16     Creatinine (mg/dL)   Date Value   02/14/2021 0.98   02/13/2021 0.68   08/12/2018 0.76     Additional  Triglycerides (mg/dL)   Date Value   02/14/2011 83     Ketones Urine (mg/dL)   Date Value   02/13/2021 Negative        B/P: 128/50, T: 97.9, P: 74, R: 14      MEDICATIONS  Medications reviewed:    acetaminophen  975 mg Oral Q8H     aspirin  325 mg Oral Daily     docusate sodium  100 mg Oral BID     polyethylene glycol  17 g Oral Daily     senna-docusate  1 tablet Oral BID        lactated ringers 75 mL/hr at 02/14/21 1840     sodium chloride 75 mL/hr at 02/14/21 0744           ASSESSED NUTRITION NEEDS PER APPROVED PRACTICE GUIDELINES:    Dosing Weight 43 kg   Estimated Energy Needs: 30-35+ Kcal/Kg  Justification: repletion and underweight  Estimated Protein Needs: 1.5-2 g pro/Kg  Justification: preservation of lean body mass and repletion  Estimated Fluid Needs: per MD      NUTRITION DIAGNOSIS:  Malnutrition related to suspect acute on chronic decreased appetite/intakes in the setting of dementia as evidenced by likely meeting <75% needs orally for period of at least 1 month PTA with BMI of 16 kg/m^2, and overt fat/muscle loss.    NUTRITION INTERVENTIONS  Recommendations / Nutrition Prescription  Diet per MD.  Need for SLP consult per MD/RN discretion.    Add supplement offerings with meals TID.      Implementation  Nutrition education: Not appropriate at this time due to patient condition.    Medical Food Supplement: As above.     Collaboration and Referral of Nutrition care: Discussed POC with current RN and sitter in room.    Nutrition Goals  Patient to consume at least 50-75% of meals/supplements while admitted.      MONITORING AND EVALUATION:  Progress towards goals will be monitored and evaluated per protocol and Practice Guidelines          Franci Damian RDN, LD  Clinical Dietitian  3rd floor/ICU: [phone]  All other floors: [phone]  Weekend/holiday: [phone]

## 2021-02-15 NOTE — CONSULTS
.  Care Management Initial Consult    General Information  Assessment completed with:  Sandra PERSAUD CM  Type of CM/SW Visit: Initial Assessment    Reason for Consult: discharge planning. Noted per ortho PA documentation the anticipation of pt's discharge to TCU 1-2 days, noted PT assessment with TCU recommended. Hospitalist documentation from today of discharge 2-3 days     See [facility] trauma care plan in medical record under documention of Sandra Tamez RN.     Advance Care Planning: Advance Care Planning Reviewed: no concerns identified     General Information Comments: coordination of discharge planning with Sandra PERSAUD CM    Communication Assessment  Patient's communication style: spoken language (English or Bilingual)    Hearing Difficulty or Deaf: yes        Cognitive  Cognitive/Neuro/Behavioral: .WDL except, orientation  Level of Consciousness: confused, lethargic  Arousal Level: opens eyes spontaneously  Orientation: disoriented x 4  Mood/Behavior: calm  Best Language: 0 - No aphasia  Speech: clear, illogical    Living Environment:   People in home:       Current living Arrangements: residential facility  Name of Facility: [facility]   Able to return to prior arrangements: (after rehab stay)       Family/Social Support:  Care provided by: self, child(carmen), other (see comments)  Provides care for: no one  Marital Status:   Children, Facility resident(s)/Staff          Description of Support System: Involved, Supportive         Current Resources:    Per report, pt had been independent at home ( [facility]) with ambulation, transfers, eating, bathing and toileting. Pt was receiving assistance for bathing and med management.         Equipment currently used at home: walker, rolling      Employment/Financial:  Employment Status: retired        Financial Concerns: No concerns identified           Lifestyle & Psychosocial Needs:        Socioeconomic History     Marital  status:      Spouse name: Not on file     Number of children: Not on file     Years of education: Not on file     Highest education level: Not on file     Tobacco Use     Smoking status: Never Smoker     Smokeless tobacco: Never Used   Substance and Sexual Activity     Alcohol use: No     Drug use: No     Sexual activity: Never                Additional Information:    Per request of Sandy referrals have nya made to [facility], [facility] and [facility].      Note: pt will need to be sitter free for 24 hours for TCU admission.    Plan :      Will await facility determinations, MD determination of discharge date, continue planning accordingly.          .    DONYA Gomez   Inpatient Care Coordination   [facility]     [phone]

## 2021-02-15 NOTE — PROVIDER NOTIFICATION
DATE:  2/15/2021   TIME OF RECEIPT FROM LAB:  0744  LAB TEST:  Hgb  LAB VALUE:  6.8  RESULTS GIVEN WITH READ-BACK TO (PROVIDER): Dr. Brown    TIME LAB VALUE REPORTED TO PROVIDER:   0746    0800: received orders for blood replacement.

## 2021-02-15 NOTE — PLAN OF CARE
Patient vital signs are at baseline: Yes  Patient able to ambulate as they were prior to admission or with assist devices provided by therapies during their stay:  No,  Reason:  has not been OOB  Patient MUST void prior to discharge:  No,  Reason:  quevedo catheter in place  Patient able to tolerate oral intake:  No,  Reason:  coughing after drinking water. Frequent loose sounding cough   Pain has adequate pain control using Oral analgesics:  Yes    Arrived back to floor around 1630. Sitter at bedside. VSS. Pt frequently pulling off nasal cannula but is not requiring oxygen. Sedated/groggy. Does not follow direction, difficult to assess CMS. Feet equally cool to touch, pulse remains weak to R foot. Dressings CDI. Frequent wet sounding cough after arrival back to floor. Attempted sips of water-cough continued, made NPO for now, will continue to monitor. Per sitter, pt was able to cough up a medium amount of green sputum and cough became less frequent. Denies pain. Discharge TBD.

## 2021-02-15 NOTE — PROGRESS NOTES
[facility] RN Trauma Coordinator:    Writer Left Voice Message for genesis   (Stefany).     Addendum:    Stefany requested referrals be sent to the following facilities:    1. [facility]  2. [facility]    Spoke with Riya Velez [facility], they prefer TCU stay. Patient had been independent with ambulation, bathing, toileting and eating. Agustin MERINO Clinical Director Jose David MCCLELLAN( [phone])  will assess therapy notes to determine if patient can return to the facility.    [facility] - services provided - Meds, Bathing X1 weekly.     Plan - waiting for return call from genesis Castellano RN  Trauma Coordinator     [facility]  [place]  [address]   [place] [address]  T: [phone]  C. [phone]  F: [phone]  E: [email or web address]    [email or web address]

## 2021-02-15 NOTE — PROGRESS NOTES
02/15/21 1100   Quick Adds   Type of Visit Initial PT Evaluation   Living Environment   People in home facility resident   Current Living Arrangements assisted living   Home Accessibility no concerns   Self-Care   Usual Activity Tolerance moderate   Current Activity Tolerance poor   Equipment Currently Used at Home walker, rolling   Activity/Exercise/Self-Care Comment Pt previously able to amb around facillity to dining room and gazebo, has done less recently booker of Covid., I toileting, able to don pants and shirt, A to bath 1 x week, 2hour checks throughout day, am and pm visits and A for all meds   Disability/Function   Hearing Difficulty or Deaf yes   Were auxiliary aids offered? yes   The following aids were provided; pocket talker   Walking or Climbing Stairs Difficulty yes   Walking or Climbing Stairs ambulation difficulty, requires equipment   Toileting issues yes  (some incontinence)   Fall history within last six months yes   Number of times patient has fallen within last six months 2   Change in Functional Status Since Onset of Current Illness/Injury yes  (decreased activity with restrictions to room with pandemic)   General Information   Onset of Illness/Injury or Date of Surgery 02/14/21   Referring Physician Torrie Montaño   Patient/Family Therapy Goals Statement (PT) none stated, per nella, agrees with TCU   Pertinent History of Current Problem (include personal factors and/or comorbidities that impact the POC) Jean M Topitzhofer is a 86 year old female who was admitted on 2/13/2021 after an unwitnessed mechanical fall in her MCU with right femur fracture. s/p IMN   Existing Precautions/Restrictions fall   Weight-Bearing Status - LUE full weight-bearing   Weight-Bearing Status - RUE full weight-bearing   Weight-Bearing Status - LLE full weight-bearing   Weight-Bearing Status - RLE weight-bearing as tolerated   General Observations Pt's nella present and supportive, A with pt's care    Cognition   Orientation Status (Cognition) person   Affect/Mental Status (Cognition) confused   Follows Commands (Cognition) follows one-step commands;verbal cues/prompting required;repetition of directions required;physical/tactile prompts required   Safety Deficit (Cognition) moderate deficit;impulsivity;safety precautions awareness;safety precautions follow-through/compliance   Cognitive Status Comments defer to OT eval   Pain Assessment   Patient Currently in Pain Yes, see Vital Sign flowsheet   Integumentary/Edema   Integumentary/Edema Comments R LE swollen s/p surgery   Posture    Posture Protracted shoulders;Kyphosis;Forward head position   Range of Motion (ROM)   ROM Comment Limited ROm R LE due to pain   Strength   Strength Comments decreased strength B LE and trunk, requires A for LE movement off bed, max A  x 2 for standing,, effort limited by comfusion and pain   Bed Mobility   Bed Mobility rolling left;rolling right;scooting/bridging;supine-sit   Rolling Left [place] (Bed Mobility) 2 person assist   Rolling Right [place] (Bed Mobility) unable to perform   Scooting/Bridging [place] (Bed Mobility) 2 person assist   Supine-Sit [place] (Bed Mobility) 2 person assist   Bed Mobility Limitations cognitive deficits;decreased ability to use legs for bridging/pushing;impaired ability to control trunk for mobility   Impairments Contributing to Impaired Bed Mobility pain;decreased strength   Assistive Device (Bed Mobility) bed rails   Comment (Bed Mobility) Bed  mob with max A x 2 with multiple cues   Transfers   Transfers sit-stand transfer;bed-chair transfer   Transfer Safety Concerns Noted decreased balance during turns;decreased step length;decreased sequencing ability;decreased weight-shifting ability   Impairments Contributing to Impaired Transfers impaired balance;pain;decreased strength;impaired postural control   Bed-Chair Transfer   Bed-Chair [place] (Transfers) 2 person  assist;maximum assist (25% patient effort)   Assistive Device (Bed-Chair Transfers) walker, front-wheeled   Sit-Stand Transfer   Sit-Stand [place] (Transfers) 2 person assist;maximum assist (25% patient effort)   Assistive Device (Sit-Stand Transfers) walker, front-wheeled   Balance   Balance Comments Impaired standing balance requires B UE support and A x 2 to maintian   Clinical Impression   Criteria for Skilled Therapeutic Intervention yes, treatment indicated   PT Diagnosis (PT) difficulty with all mob   Influenced by the following impairments post op pain, weakness, decreased ROM, balance and act reyes, cog impairment, low hgb   Functional limitations due to impairments impaired functional mob I and safety   Clinical Presentation Evolving/Changing   Clinical Presentation Rationale 3-5 deficits   Clinical Decision Making (Complexity) moderate complexity   Therapy Frequency (PT) Daily   Predicted Duration of Therapy Intervention (days/wks) 3 days   Planned Therapy Interventions (PT) bed mobility training;gait training;strengthening;ROM (range of motion);transfer training   Risk & Benefits of therapy have been explained evaluation/treatment results reviewed;care plan/treatment goals reviewed;risks/benefits reviewed;current/potential barriers reviewed;participants voiced agreement with care plan;participants included;patient;caregiver;durable/healthcare power of    PT Discharge Planning    PT Discharge Recommendation (DC Rec) Transitional Care Facility   PT Rationale for DC Rec Pt is significantly below baseline and will require cont therapies to improve strength and act reyes to progress functional mob I and safety to allow return to PLOF   PT Brief overview of current status  Requires max A x 2 to mod A x 2 to complete bed mob, sit to stand and bed to chair with FWW   Total Evaluation Time   Total Evaluation Time (Minutes) 15

## 2021-02-15 NOTE — PROVIDER NOTIFICATION
Pt unable to tolerate thickened liquids without coughing, will keep NPO. Can we get IV pain meds?    MD paged

## 2021-02-15 NOTE — PLAN OF CARE
OT order received and chart reviewed. Discharge plan is for TCU; patient is MaxAx2 for transfers.  Will defer skilled OT evaluation to next level of care.  Will complete orders.

## 2021-02-15 NOTE — PLAN OF CARE
Pt confused, but able to communicate some needs. Oxycodone given for pain x1. Swallowed better with thickened liquids and crushed meds in apple sauce. Pure wick in place, bladder scanned at 3 am for 81. IV infusing. Dressing is CDI. Repositioning in bed with assist of 2.

## 2021-02-15 NOTE — PROGRESS NOTES
Orthopedic Surgery  Jean M Topitzhofer  2/15/2021  Admit Date:  2021  POD 1 Day Post-Op  S/P Procedure(s):  Right hip cephalomedullary nailing, femur fracture    Patient resting comfortably in bed.    Pain controlled.  Tolerating oral intake.    Denies nausea or vomiting  Denies chest pain or shortness of breath  Low hgb - receiving blood    Alert and orient to person - baseline dementia  Vital Sign Ranges  Temperature Temp  Av.2  F (36.8  C)  Min: 97.8  F (36.6  C)  Max: 98.9  F (37.2  C)   Blood pressure Systolic (24hrs), Av , Min:102 , Max:145        Diastolic (24hrs), Av, Min:50, Max:85      Pulse Pulse  Av.9  Min: 69  Max: 103   Respirations Resp  Avg: 10.7  Min: 6  Max: 16   Pulse oximetry SpO2  Av.1 %  Min: 88 %  Max: 100 %       Dressing is clean, dry, and intact.   Minimal erythema of the surrounding skin.   Bilateral calves are soft, non-tender.  Bilateral lower extremity is NVI.  Sensation intact bilateral lower extremities  Active dorsi and plantar flexion bilaterally  +Dp pulse    Labs:  Recent Labs   Lab Test 21  0643 21  1419 18  0717   POTASSIUM 4.5 4.5 3.7     Recent Labs   Lab Test 02/15/21  0644 21  0643 21  1419   HGB 6.8* 9.2* 14.6     No results for input(s): INR in the last 48629 hours.  Recent Labs   Lab Test 21  0643 21  1419 18  1428    250 264       A/P  1. S/p right intertrochanteric femur fracture ORIF   Continue ASA for DVT prophylaxis.     Mobilize with PT/OT    WBAT with walker   Leave dressing intact.     Continue current pain regiment.    2. Disposition   Anticipate d/c to TCU based on medical clearance and progress, likely 1-2+ more days.    Neida Vizcarra PA-C

## 2021-02-15 NOTE — PLAN OF CARE
Patient vital signs are at baseline: Yes  Patient able to ambulate as they were prior to admission or with assist devices provided by therapies during their stay:  No,  Reason:  Pt Ax2, using gait belt and walker pivoting to bedside chair. Did require Charity Steady back to bed this evening.   Patient MUST void prior to discharge:  No,  Reason:  Pt DTV-bladder scanned for 212 ml.   Patient able to tolerate oral intake:  Yes-regular diet; needs assistance with feeding. PO zofran given for nausea but has since improved.   Pain has adequate pain control using Oral analgesics:  Yes-PO tramadol and scheduled tylenol.    Pt disorientated to time, place, and situation. Lethargic this AM but has now become more awake and cooperative. Niece @ beside.   Pt Hgb 6.8 this AM-transfusing 2 units of blood.     1800: Straight cathed for 300 ml. IV fluids infusing.

## 2021-02-15 NOTE — PROGRESS NOTES
[facility]    Hospitalist Progress Note    Date of Service (when I saw the patient): 02/15/2021  Provider:  Walt Brown MD   Text Page  7am - 6PM       Assessment & Plan   Jean M Topitzhofer is a 86 year old female who was admitted on 2/13/2021 after an unwitnessed mechanical fall in her MCU with right femur fracture.     Mechanical fall, unwitnessed.  Comminuted intra trochanteric R femur fracture s/p ORIF Day#1.   -orthopedic surgery consulted - underwent surgery 2/14.  -Defer DVT prophylaxis to Ortho  -Pain control: Scheduled acetaminophen; prn oxycodone, acetaminophen, and morphine  -Fall precautions, CMS/neuro checks  -Tele monitoring - presumed mechanical fall but unwitnessed. EKG unchanged from 2018   -Hx of UTI - negative UA.     Acute anemia, 6.9 (9.2 --> 14.6) likely blood loss d/t thigh hematoma 2/2 fracture. Two units of PRBC ordered, consented over the phone with genesis Flores (co guardian). I Will follow up.     Dementia: Unknown baseline cognitive status. Complicates cares.      Hypertension: normal to soft likely secondary to narcotics. Hold PTA triamterene-hydrochlorothiazide       Incidental findings:  -Rounded 15 mm right lung airspace opacity - changed from 12/2014 - CT chest recommended      -CT head: Disproportionate enlargement of the ventricular system compared to the degree of volume loss - consider NPH - unable to full assess additional symptoms - limited mobility with fracture and dementia at baseline. Monitor.      FEN: Oral hydration/NS 75 ml/hr; mag/phosphorus; regular/NPO   Activity: Bedrest - PT/OT following surgery   DVT Prophylaxis: Defer to primary service  Code Status: Full Code to be changed after surgery.     Disposition: Expected discharge in 2 - 3 days once surgeon in agreement.    Interval History   Not in pain during my visit, confused, non reliable.     -Data reviewed today: I reviewed all new labs and imaging results over the last 24  hours. I personally reviewed the EKG tracing showing sinus with RBBB.    Physical Exam   Temp: 97.9  F (36.6  C) Temp src: Temporal BP: 128/50 Pulse: 74   Resp: 14 SpO2: 96 % O2 Device: None (Room air) Oxygen Delivery: 2 LPM  Vitals:    02/13/21 1803   Weight: 42.9 kg (94 lb 8 oz)     Vital Signs with Ranges  Temp:  [97.8  F (36.6  C)-98.9  F (37.2  C)] 97.9  F (36.6  C)  Pulse:  [] 74  Resp:  [6-18] 14  BP: (102-145)/(50-85) 128/50  SpO2:  [88 %-100 %] 96 %  I/O last 3 completed shifts:  In: 2614 [P.O.:50; I.V.:2564]  Out: 400 [Urine:300; Blood:100]    GEN:  Pale. Sleepy, appears comfortable, NAD.  HEENT:  Normocephalic/atraumatic, no scleral icterus, no nasal discharge, mouth moist.  CV:  Regular rate and rhythm, no murmur or JVD.  S1 + S2 noted, no S3 or S4.  LUNGS:  Clear to auscultation bilaterally without rales/rhonchi/wheezing/retractions.  Symmetric chest rise on inhalation noted.  ABD:  Active bowel sounds, soft, non-tender/non-distended.  No rebound/guarding/rigidity.  EXT:  R thigh bigger than left, surgical site of normal aspect.  No edema or cyanosis.  No joint synovitis noted.  SKIN:  Dry to touch, no exanthems noted in the visualized areas.       Medications     lactated ringers 75 mL/hr at 02/14/21 1840     sodium chloride 75 mL/hr at 02/14/21 0744       acetaminophen  975 mg Oral Q8H     aspirin  325 mg Oral Daily     docusate sodium  100 mg Oral BID     polyethylene glycol  17 g Oral Daily     senna-docusate  1 tablet Oral BID       Data   Recent Labs   Lab 02/15/21  0644 02/14/21  0643 02/13/21  1419   WBC  --  13.2* 9.9   HGB 6.8* 9.2* 14.6   MCV  --  102* 101*   PLT  --  168 250   NA  --  140 140   POTASSIUM  --  4.5 4.5   CHLORIDE  --  110* 108   CO2  --  25 27   BUN  --  24 13   CR  --  0.98 0.68   ANIONGAP  --  5 5   TOBY  --  8.1* 9.0   * 109* 114*       Recent Results (from the past 24 hour(s))   XR Surgery AMRY L/T 5 Min Fluoro w Stills    Narrative    This exam was marked as  non-reportable because it will not be read by a   radiologist or a [place] non-radiologist provider.           Disclaimer: This note consists of symbols derived from keyboarding, dictation and/or voice recognition software. As a result, there may be errors in the script that have gone undetected. Please consider this when interpreting information found in this chart.

## 2021-02-16 VITALS
OXYGEN SATURATION: 95 % | BODY MASS INDEX: 16.74 KG/M2 | HEIGHT: 63 IN | RESPIRATION RATE: 16 BRPM | HEART RATE: 68 BPM | DIASTOLIC BLOOD PRESSURE: 65 MMHG | SYSTOLIC BLOOD PRESSURE: 140 MMHG | TEMPERATURE: 98.9 F | WEIGHT: 94.5 LBS

## 2021-02-16 LAB
ANION GAP SERPL CALCULATED.3IONS-SCNC: 4 MMOL/L (ref 3–14)
BASOPHILS # BLD AUTO: 0 10E9/L (ref 0–0.2)
BASOPHILS NFR BLD AUTO: 0.2 %
BUN SERPL-MCNC: 27 MG/DL (ref 7–30)
CALCIUM SERPL-MCNC: 7.6 MG/DL (ref 8.5–10.1)
CHLORIDE SERPL-SCNC: 109 MMOL/L (ref 94–109)
CO2 SERPL-SCNC: 25 MMOL/L (ref 20–32)
CREAT SERPL-MCNC: 0.77 MG/DL (ref 0.52–1.04)
DEPRECATED CALCIDIOL+CALCIFEROL SERPL-MC: <40 UG/L (ref 20–75)
DIFFERENTIAL METHOD BLD: ABNORMAL
EOSINOPHIL # BLD AUTO: 0.4 10E9/L (ref 0–0.7)
EOSINOPHIL NFR BLD AUTO: 3.5 %
ERYTHROCYTE [DISTWIDTH] IN BLOOD BY AUTOMATED COUNT: 14.9 % (ref 10–15)
GFR SERPL CREATININE-BSD FRML MDRD: 70 ML/MIN/{1.73_M2}
GLUCOSE SERPL-MCNC: 85 MG/DL (ref 70–99)
HCT VFR BLD AUTO: 27.3 % (ref 35–47)
HGB BLD-MCNC: 8.4 G/DL (ref 11.7–15.7)
IMM GRANULOCYTES # BLD: 0.1 10E9/L (ref 0–0.4)
IMM GRANULOCYTES NFR BLD: 0.8 %
LYMPHOCYTES # BLD AUTO: 0.8 10E9/L (ref 0.8–5.3)
LYMPHOCYTES NFR BLD AUTO: 6.8 %
MCH RBC QN AUTO: 30.7 PG (ref 26.5–33)
MCHC RBC AUTO-ENTMCNC: 30.8 G/DL (ref 31.5–36.5)
MCV RBC AUTO: 100 FL (ref 78–100)
MONOCYTES # BLD AUTO: 1.1 10E9/L (ref 0–1.3)
MONOCYTES NFR BLD AUTO: 9.9 %
NEUTROPHILS # BLD AUTO: 8.8 10E9/L (ref 1.6–8.3)
NEUTROPHILS NFR BLD AUTO: 78.8 %
NRBC # BLD AUTO: 0 10*3/UL
NRBC BLD AUTO-RTO: 0 /100
PLATELET # BLD AUTO: 107 10E9/L (ref 150–450)
POTASSIUM SERPL-SCNC: 4.1 MMOL/L (ref 3.4–5.3)
RBC # BLD AUTO: 2.74 10E12/L (ref 3.8–5.2)
SODIUM SERPL-SCNC: 138 MMOL/L (ref 133–144)
VITAMIN D2 SERPL-MCNC: <5 UG/L
VITAMIN D3 SERPL-MCNC: 35 UG/L
WBC # BLD AUTO: 11.1 10E9/L (ref 4–11)

## 2021-02-16 PROCEDURE — 80048 BASIC METABOLIC PNL TOTAL CA: CPT | Performed by: HOSPITALIST

## 2021-02-16 PROCEDURE — 36415 COLL VENOUS BLD VENIPUNCTURE: CPT | Performed by: HOSPITALIST

## 2021-02-16 PROCEDURE — 99239 HOSP IP/OBS DSCHRG MGMT >30: CPT | Performed by: HOSPITALIST

## 2021-02-16 PROCEDURE — 250N000011 HC RX IP 250 OP 636: Performed by: PHYSICIAN ASSISTANT

## 2021-02-16 PROCEDURE — 250N000013 HC RX MED GY IP 250 OP 250 PS 637: Performed by: PHYSICIAN ASSISTANT

## 2021-02-16 PROCEDURE — 258N000003 HC RX IP 258 OP 636: Performed by: PHYSICIAN ASSISTANT

## 2021-02-16 PROCEDURE — 250N000013 HC RX MED GY IP 250 OP 250 PS 637: Performed by: HOSPITALIST

## 2021-02-16 PROCEDURE — 85025 COMPLETE CBC W/AUTO DIFF WBC: CPT | Performed by: HOSPITALIST

## 2021-02-16 PROCEDURE — 250N000013 HC RX MED GY IP 250 OP 250 PS 637: Performed by: INTERNAL MEDICINE

## 2021-02-16 RX ORDER — TAMSULOSIN HYDROCHLORIDE 0.4 MG/1
0.4 CAPSULE ORAL DAILY
Status: DISCONTINUED | OUTPATIENT
Start: 2021-02-16 | End: 2021-02-16 | Stop reason: HOSPADM

## 2021-02-16 RX ORDER — VITAMIN B COMPLEX
50 TABLET ORAL DAILY
Qty: 60 TABLET | Refills: 0 | DISCHARGE
Start: 2021-02-17

## 2021-02-16 RX ORDER — TAMSULOSIN HYDROCHLORIDE 0.4 MG/1
0.4 CAPSULE ORAL DAILY
DISCHARGE
Start: 2021-02-17

## 2021-02-16 RX ORDER — TRAMADOL HYDROCHLORIDE 50 MG/1
50 TABLET ORAL EVERY 6 HOURS PRN
Qty: 40 TABLET | Refills: 0 | Status: SHIPPED | OUTPATIENT
Start: 2021-02-16 | End: 2021-03-17

## 2021-02-16 RX ADMIN — ASPIRIN 325 MG: 325 TABLET, COATED ORAL at 08:24

## 2021-02-16 RX ADMIN — Medication 50 MCG: at 08:26

## 2021-02-16 RX ADMIN — SODIUM CHLORIDE, POTASSIUM CHLORIDE, SODIUM LACTATE AND CALCIUM CHLORIDE: 600; 310; 30; 20 INJECTION, SOLUTION INTRAVENOUS at 04:45

## 2021-02-16 RX ADMIN — TAMSULOSIN HYDROCHLORIDE 0.4 MG: 0.4 CAPSULE ORAL at 12:23

## 2021-02-16 RX ADMIN — ACETAMINOPHEN 975 MG: 325 TABLET, FILM COATED ORAL at 04:45

## 2021-02-16 RX ADMIN — DOCUSATE SODIUM 50 MG AND SENNOSIDES 8.6 MG 1 TABLET: 8.6; 5 TABLET, FILM COATED ORAL at 08:25

## 2021-02-16 RX ADMIN — DOCUSATE SODIUM 100 MG: 100 CAPSULE, LIQUID FILLED ORAL at 08:25

## 2021-02-16 RX ADMIN — ONDANSETRON 4 MG: 4 TABLET, ORALLY DISINTEGRATING ORAL at 11:50

## 2021-02-16 RX ADMIN — TRAMADOL HYDROCHLORIDE 50 MG: 50 TABLET, FILM COATED ORAL at 08:22

## 2021-02-16 RX ADMIN — ACETAMINOPHEN 975 MG: 325 TABLET, FILM COATED ORAL at 12:23

## 2021-02-16 RX ADMIN — TRAMADOL HYDROCHLORIDE 50 MG: 50 TABLET, FILM COATED ORAL at 16:17

## 2021-02-16 ASSESSMENT — ACTIVITIES OF DAILY LIVING (ADL)
ADLS_ACUITY_SCORE: 27

## 2021-02-16 NOTE — PROVIDER NOTIFICATION
Web-based page: Pt has not voided since quevedo removed last night @ 2200. Just straight cathed for 300 cc. Should she get any more fluid? Thanks

## 2021-02-16 NOTE — PLAN OF CARE
Patient vital signs are at baseline: Yes  Patient able to ambulate as they were prior to admission or with assist devices provided by therapies during their stay:  No,  Reason:  Up with max Ax2 walker and GB or sarasteady.  Patient MUST void prior to discharge:  No,  Reason:  Pt was unable to void overnight, bladder scanned for 254 this AM. Pt is still DTV.  Patient able to tolerate oral intake:  Yes  Pain has adequate pain control using Oral analgesics:  Yes- scheduled Tylenol.

## 2021-02-16 NOTE — PROGRESS NOTES
Your information has been submitted on February 16th, 2021 at 01:26:13 PM CST. The confirmation number is [id number]    Keyana Shin  Care Management Coordinator  [facility]  [phone]

## 2021-02-16 NOTE — PROGRESS NOTES
SPIRITUAL HEALTH SERVICES Progress Note  FR visited 6th floor ortho    Visited pt, migue Tomas.  I introduced myself as a staff  and asked if was alright to enter.  The pt said it was and the nurse on call and a family member informed me that she was very hard of hearing so I came closer to the pt and spoke louder when addressing the pt.  I asked if there was anything I could do that would be helpful today.  The pt asked if I would say the Lord's Prayer and I said that I would.    Myself and the family member held Jeans hand as we prayed the Lord's prayer together.  She said she was thankful for this prayer and looking forward to being released from the hospital today by 5pm.  I said I would continue to pray for her and spiritual health would remain available if needed.      Bryn Bolden   Intern

## 2021-02-16 NOTE — PLAN OF CARE
Patient vital signs are at baseline: Yes  Patient able to ambulate as they were prior to admission or with assist devices provided by therapies during their stay:  No,  Reason:  Pt Ax2 w/ Charity Steady.  Patient MUST void prior to discharge:  Yes-retaining some.   Patient able to tolerate oral intake:  Yes-regular diet.  Pain has adequate pain control using Oral analgesics:  Yes-PO ultram and scheduled tylenol.    Pt disorientated to place, time, and situation. Calm & cooperative.     Reviewed discharge instructions and medications with patient and niece. Questions answered. Patient discharged to TCU via [facility] transport with, discharge instructions, and belongings at this time.

## 2021-02-16 NOTE — DISCHARGE SUMMARY
[facility]    Discharge Summary  Hospitalist    Date of Admission:  2/13/2021  Date of Discharge:  2/16/2021  Provider:  Walt Brown MD  Date of Service (when I last saw the patient): 02/16/21    Discharge Diagnoses   Mechanical fall, unwitnessed.  Comminuted intra trochanteric R femur fracture s/p ORIF Day#2.    Acute blood loss anemia secondary to femur fracture status post transfusion.  Essential hypertension.  Dementia.  Incidental finding of right lung airspace opacity, changed from prior study.  CT follow-up recommended.  Severe malnutrition in context of acute illness or injury with underlying chronic illness or disease.    Other medical issues:  Past Medical History:   Diagnosis Date     Benign essential hypertension      Dementia (H)        History of Present Illness   Jean M Topitzhofer is an 86 year old female who presented with fall and fracture.  Please see the admission history and physical for full details.    Hospital Course   Jean M Topitzhofer is a 86 year old female who was admitted on 2/13/2021 after an unwitnessed mechanical fall in her MCU with right femur fracture.   She underwent open reduction internal fixation on February 14.  Her hemoglobin continued to drop after surgery and likely from bleeding in the thigh down to 6.9, she had 2 PRBC units started on iron supplement.  Her hemoglobin stabilized and its increasing.  Despite of her age she has shown great tolerance to a significant acute anemia.  She has not had any complications.  She is in condition to discharge to transitional care.  Active problems treated during this hospital stay    Mechanical fall, unwitnessed.  Comminuted intra trochanteric R femur fracture s/p ORIF Day#2.    Acute blood loss anemia secondary to femur fracture status post transfusion.  Essential hypertension.  Dementia.  Incidental finding of right lung airspace opacity, changed from prior study.  CT follow-up recommended.    # Discharge  Pain Plan:    - Patient currently Is being prescribed pain medications on discharge by surgeon.      Significant Results and Procedures   See below     Pending Results   Unresulted Labs Ordered in the Past 30 Days of this Admission     No orders found from 1/14/2021 to 2/14/2021.          Code Status   DNR / DNI       Primary Care Physician   [facility]    GEN:  Pale. Alert, appears comfortable, NAD.  HEENT:  Normocephalic/atraumatic, no scleral icterus, no nasal discharge, mouth moist.  CV:  Regular rate and rhythm, no murmur or JVD.  S1 + S2 noted, no S3 or S4.  LUNGS:  Clear to auscultation bilaterally without rales/rhonchi/wheezing/retractions.  Symmetric chest rise on inhalation noted.  ABD:  Active bowel sounds, soft, non-tender/non-distended.  No rebound/guarding/rigidity.  EXT:  R thigh bigger than left, surgical site of normal aspect.  No edema or cyanosis.  No joint synovitis noted.  SKIN:  Dry to touch, no exanthems noted in the visualized areas     Discharge Disposition   Discharged to TCU    Consultations This Hospital Stay   ORTHOPEDIC SURGERY IP CONSULT  CARE MANAGEMENT / SOCIAL WORK IP CONSULT  NUTRITION SERVICES ADULT IP CONSULT  PHYSICAL THERAPY ADULT IP CONSULT  OCCUPATIONAL THERAPY ADULT IP CONSULT  PHYSICAL THERAPY ADULT IP CONSULT  OCCUPATIONAL THERAPY ADULT IP CONSULT    Time Spent on this Encounter   I, Walt Brown MD, personally saw the patient today and spent greater than 30 minutes discharging this patient.     Discharge Orders      General info for SNF    Length of Stay Estimate: Short Term Care: Estimated # of Days <30  Condition at Discharge: Improving  Level of care:skilled   Rehabilitation Potential: Good  Admission H&P remains valid and up-to-date: Yes  Recent Chemotherapy: N/A  Use Nursing Home Standing Orders: Yes     Mantoux instructions    Give two-step Mantoux (PPD) Per Facility Policy Yes     Reason for your hospital stay    Right intertrochanteric femur  fracture open reduction and internal fixation     Weight bearing status    WBAT with walker     Activity - Up with assistive device     Follow Up and recommended labs and tests    Follow up with Dr. Alan in 2 weeks.  No follow up labs or test are needed.  Call for appointment or questions [phone] ([facility] Patient Coordinator for Dr. Alan)     Wound care    Site:   Right hip  Instructions:  Leave dressing intact if Aquacel and remove at POD #7-10, remove staples POD #14  Daily dressing changes if gauze and tape     Physical Therapy Adult Consult    Evaluate and treat as clinically indicated.    Reason:  Right intertrochanteric femur fracture open reduction and internal fixation     Occupational Therapy Adult Consult    Evaluate and treat as clinically indicated.    Reason:  Right intertrochanteric femur fracture open reduction and internal fixation     Fall precautions     Crutches DME    DME Documentation: Describe the reason for need to support medical necessity: Impaired gait status post hip surgery. I, the undersigned, certify that the above prescribed supplies are medically necessary for this patient and is both reasonable and necessary in reference to accepted standards of medical practice in the treatment of this patient's condition and is not prescribed as a convenience.     Cane DME    DME Documentation: Describe the reason for need to support medical necessity: Impaired gait status post hip surgery. I, the undersigned, certify that the above prescribed supplies are medically necessary for this patient and is both reasonable and necessary in reference to accepted standards of medical practice in the treatment of this patient's condition and is not prescribed as a convenience.     Walker DME    : DME Documentation: Describe the reason for need to support medical necessity: Impaired gait status post hip surgery. I, the undersigned, certify that the above prescribed supplies are medically necessary  for this patient and is both reasonable and necessary in reference to accepted standards of medical practice in the treatment of this patient's condition and is not prescribed as a convenience.     Advance Diet as Tolerated    Follow this diet upon discharge: Orders Placed This Encounter      Advance Diet as Tolerated: Regular Diet Adult     Discharge Medications   Current Discharge Medication List      START taking these medications    Details   aspirin (ASA) 325 MG EC tablet Take 1 tablet (325 mg) by mouth daily  Qty: 30 tablet, Refills: 0    Associated Diagnoses: Closed displaced intertrochanteric fracture of right femur, initial encounter (H)      ferrous fumarate-vitamin C ER (SHANTE-SEQUELS) 65-25 MG CR tablet Take 1 tablet by mouth daily (with breakfast)  Qty:      Associated Diagnoses: Anemia due to blood loss, acute      polyethylene glycol (MIRALAX) 17 g packet Take 17 g by mouth daily as needed for constipation    Associated Diagnoses: Closed displaced intertrochanteric fracture of right femur, initial encounter (H)      tamsulosin (FLOMAX) 0.4 MG capsule Take 1 capsule (0.4 mg) by mouth daily  Qty:      Associated Diagnoses: Urinary retention      traMADol (ULTRAM) 50 MG tablet Take 1 tablet (50 mg) by mouth every 6 hours as needed for moderate to severe pain  Qty: 40 tablet, Refills: 0    Associated Diagnoses: Closed displaced intertrochanteric fracture of right femur, initial encounter (H)      Vitamin D3 (CHOLECALCIFEROL) 25 mcg (1000 units) tablet Take 2 tablets (50 mcg) by mouth daily  Qty: 60 tablet, Refills: 0    Associated Diagnoses: Closed displaced intertrochanteric fracture of right femur, initial encounter (H)         CONTINUE these medications which have NOT CHANGED    Details   acetaminophen (TYLENOL) 325 MG tablet Take 650 mg by mouth every 8 hours as needed for mild pain      calcium carbonate (ANTACID EXTRA STRENGTH) 750 MG CHEW Take 1,500 mg by mouth daily as needed       dorzolamide-timolol (COSOPT) 2-0.5 % ophthalmic solution Place 1 drop into the right eye every morning       Emollient (CERAVE) CREA Externally apply topically daily To boy legs      Eyelid Cleansers (AVENOVA EX) Externally apply 1 spray topically daily Spray to closed eyes covering eye lids      hypromellose (ARTIFICIAL TEARS) 0.5 % SOLN ophthalmic solution Place 2 drops into both eyes 3 times daily      latanoprost (XALATAN) 0.005 % ophthalmic solution Place 1 drop into both eyes At Bedtime   Refills: 4      Multiple Vitamins-Minerals (CENTRUM) TABS Take 1 tablet by mouth daily  Qty: 30 tablet, Refills: 0    Associated Diagnoses: Routine general medical examination at a health care facility      guaiFENesin (ROBITUSSIN) 100 MG/5ML SYRP Take 10 mLs by mouth every 4 hours as needed for cough           Allergies   No Known Allergies  Data   Most Recent 3 CBC's:  Recent Labs   Lab Test 02/16/21  0713 02/15/21  1750 02/15/21  0644 02/14/21  0643 02/13/21  1419   WBC 11.1*  --   --  13.2* 9.9   HGB 8.4* 9.8* 6.8* 9.2* 14.6     --   --  102* 101*   *  --   --  168 250      Most Recent 3 BMP's:  Recent Labs   Lab Test 02/16/21  0713 02/15/21  0644 02/14/21  0643 02/13/21  1419     --  140 140   POTASSIUM 4.1  --  4.5 4.5   CHLORIDE 109  --  110* 108   CO2 25  --  25 27   BUN 27  --  24 13   CR 0.77  --  0.98 0.68   ANIONGAP 4  --  5 5   TOBY 7.6*  --  8.1* 9.0   GLC 85 115* 109* 114*     Most Recent 2 LFT's:  Recent Labs   Lab Test 08/11/18  1428 01/04/15  0902   AST 80* 17   ALT 40 23   ALKPHOS 88 75   BILITOTAL 0.8 0.4     Most Recent INR's and Anticoagulation Dosing History:  Anticoagulation Dose History     There is no flowsheet data to display.        Most Recent 3 Troponin's:  Recent Labs   Lab Test 08/11/18  1500 12/31/14  0715 12/30/14  2349 12/30/14  1647   TROPI  --  0.026 0.023 0.021   TROPONIN 0.00  --   --   --      Most Recent Cholesterol Panel:No lab results found.  Most Recent 6  Bacteria Isolates From Any Culture (See EPIC Reports for Culture Details):  Recent Labs   Lab Test 08/11/18  1444 12/30/14  2120 12/30/14  1730 12/30/14  1615   CULT >100,000 colonies/mL  Escherichia coli  * No growth Normal antony  Heavy growth Haemophilus influenzae Beta lactamase negative  * No growth  No growth     Most Recent TSH, T4 and A1c Labs:  Recent Labs   Lab Test 01/01/15  0600   TSH 0.89     Results for orders placed or performed during the hospital encounter of 02/13/21   CT Head w/o Contrast    Narrative    CT SCAN OF THE HEAD WITHOUT CONTRAST   2/13/2021 3:10 PM     HISTORY: Head trauma, mod-severe.    TECHNIQUE:  Axial images of the head and coronal reformations without  IV contrast material. Radiation dose for this scan was reduced using  automated exposure control, adjustment of the mA and/or kV according  to patient size, or iterative reconstruction technique.    COMPARISON: None.    FINDINGS:  Disproportionate enlargement of the ventricular system  compared to the degree of bone loss. No evidence of acute ischemia or  hemorrhage. The visualized calvarium, tympanic cavities, and mastoid  cavities are unremarkable.      Impression    IMPRESSION:   1. No acute intracranial abnormality.  2. Disproportionate enlargement of the ventricular system compared to  the degree of volume loss which can be seen in the setting of normal  pressure hydrocephalus.    ILYA ALMONTE MD   Cervical spine CT w/o contrast    Narrative    CT CERVICAL SPINE WITHOUT CONTRAST   2/13/2021 3:12 PM     HISTORY: Neck trauma (Age >= 65y); neck pain, trauma.     TECHNIQUE: Axial images of the cervical spine were obtained without  intravenous contrast. Multiplanar reformations were performed.   Radiation dose for this scan was reduced using automated exposure  control, adjustment of the mA and/or kV according to patient size, or  iterative reconstruction technique.    COMPARISON: None.    FINDINGS: No evidence of acute fracture  or posttraumatic subluxation.  Severe degenerative change. Multiple areas of spinal canal stenosis  including severe spinal canal stenosis at C3-C4 and C4-C5. Multilevel  severe foraminal stenoses. Scattered vascular calcifications. Multiple  nonspecific lung nodules, some of which are at least partially  calcified. Outpatient chest CT correlation could be performed.      Impression    IMPRESSION:   1. No evidence of acute fracture or subluxation in the cervical spine.  2. Severe degenerative change with multiple severe stenoses.    ILYA ALMONTE MD   XR Shoulder Right G/E 3 Views    Narrative    RIGHT SHOULDER THREE OR MORE VIEWS  2/13/2021 3:25 PM     INDICATION: Right-sided shoulder pain after an injury.     COMPARISON: None.      Impression    IMPRESSION:  1.  No fracture or joint malalignment.  2.  Mild right glenohumeral degenerative arthrosis.  3.  Bone demineralization.  4.  Rounded 15 mm right lung airspace opacity. This is new or more  conspicuous since 12/30/2014. Dedicated chest CT is recommended in  further evaluation.    ANH ANN MD   XR Pelvis w Hip Right 1 View    Narrative    PELVIS AND RIGHT HIP, ONE VIEW  2/13/2021 3:25 PM     INDICATION: Right-sided hip pain after trauma.     COMPARISON: None.      Impression    IMPRESSION:  1.  Comminuted intratrochanteric fracture of the right femur. There is  fracture impaction, varus angulation, and a small mildly displaced  lesser trochanteric fragment.  2.  Mild right hip degenerative arthrosis.  3.  Degenerative changes in the lower lumbar spine.  4.  Bone demineralization.    ANH ANN MD   XR Elbow Right G/E 3 Views    Narrative    RIGHT ELBOW THREE OR MORE VIEWS  2/13/2021 3:26 PM     INDICATION: Right elbow pain after an injury.     COMPARISON: None.      Impression    IMPRESSION:  1.  Normal joint spacing and alignment.  2.  No fracture or joint effusion.  3.  Bone demineralization.    ANH ANN MD   XR Surgery MARY L/T 5 Min Fluoro  w Stills    Narrative    This exam was marked as non-reportable because it will not be read by a   radiologist or a [place] non-radiologist provider.                 Disclaimer: This note consists of symbols derived from keyboarding, dictation and/or voice recognition software. As a result, there may be errors in the script that have gone undetected. Please consider this when interpreting information found in this chart.

## 2021-02-16 NOTE — PROGRESS NOTES
Orthopedic Surgery  Jean M Topitzhofer  2021  Admit Date:  2021  POD 2 Days Post-Op  S/P Procedure(s):  Right hip cephalomedullary nailing, femur fracture    Patient resting comfortably in bed.    Pain controlled.  Tolerating oral intake.    Denies nausea or vomiting  Denies chest pain or shortness of breath  No events overnight.     Alert and orient to person - baseline dementia  Vital Sign Ranges  Temperature Temp  Av.1  F (36.7  C)  Min: 96.1  F (35.6  C)  Max: 99.3  F (37.4  C)   Blood pressure Systolic (24hrs), Av , Min:124 , Max:149        Diastolic (24hrs), Av, Min:43, Max:68      Pulse Pulse  Av.6  Min: 62  Max: 89   Respirations Resp  Av  Min: 16  Max: 16   Pulse oximetry SpO2  Av.8 %  Min: 92 %  Max: 96 %       Dressing is clean, dry, and intact.   Minimal erythema of the surrounding skin.   Bilateral calves are soft, non-tender.  Bilateral lower extremity is NVI.  Sensation intact bilateral lower extremities  Active dorsi and plantar flexion bilaterally  +Dp pulse    Labs:  Recent Labs   Lab Test 21  0713 21  0643 21  1419   POTASSIUM 4.1 4.5 4.5     Recent Labs   Lab Test 21  0713 02/15/21  1750 02/15/21  0644   HGB 8.4* 9.8* 6.8*     No results for input(s): INR in the last 72838 hours.  Recent Labs   Lab Test 21  0713 21  0643 21  1419   * 168 250       A/P  1. S/p right intertrochanteric femur fracture ORIF   Continue ASA for DVT prophylaxis.     Mobilize with PT/OT    WBAT with walker.   Leave dressing intact     Continue current pain regiment.    2. Disposition   Anticipate d/c to TCU based on progress and remaining sitter free.    Neida Vizcarra PA-C

## 2021-02-16 NOTE — PROGRESS NOTES
.Care Management Follow Up    Length of Stay (days): 3    Expected Discharge Date: waiting on MD determination of discharge date.      Concerns to be Addressed:  Needs to remain sitter free for TCU acceptance.     Patient plan of care discussed at interdisciplinary rounds: Yes    Anticipated Discharge Disposition:  [facility]     Anticipated Discharge Services:  therapy  Anticipated Discharge DME:  To be determined at TCU    Patient/family educated on Medicare website which has current facility and service quality ratings:  Yes  Education Provided on the Discharge Plan:  Yes  Patient/Family in Agreement with the Plan:  Yes    Referrals Placed by CM/SW:  Rehab facility  Private pay costs discussed: transportation costs. Per discussion with nikerry, [facility]fany [facility] transport has been requested. Discussed with her that w/c transport is not covered by Medicare, private pay cost of $75/base and $5/mi which she has acknowledged.     Additional Information:    [facility] and [facility] have assessed and would be able to accept pt, [facility] determination yet pending as they need sitter free status for 48 hours. Sitter discontinued on 2/15 @ 1030 per RN documentation.      Discussed above facility information with pt's genesis who has asked that planning continue for pt's transfer to [facility]. Discussed with her that pt would be in quarantine for 14 days, no routine visiting however video chatting capability, compassion care visits possible if determined need by staff at facility. Additional questions were addressed.     Plan:     Will await MD determination of discharge date, continue planning accordingly to [facility].         .    DONYA Gomez   Inpatient Care Coordination   [facility]     [phone]     Addendum:     D: Per discussion with MD plan for pt's tranfer to rehab facility today, noted ortho PA documentation also... per discussion with RN, w/c  transport appropriate. [facility] able to accept pt today, [facility] transport arranged for 1700, SW has discussed with Stefany hurtado.       With discharge today, no further SWS.

## 2021-02-16 NOTE — PLAN OF CARE
PT: Attempted to see patient x 2; 1st attempt: patient just received breakfast and was nauseated after getting up to commode;  2nd attempt: Patient still nauseated and awaiting anti nausea meds;  feels unable to try standing and walking at this time secondary to nausea; per daughter patient is discharging to TCU today.

## 2021-02-17 ENCOUNTER — NURSING HOME VISIT (OUTPATIENT)
Dept: GERIATRICS | Facility: CLINIC | Age: 86
End: 2021-02-17
Payer: MEDICARE

## 2021-02-17 VITALS
DIASTOLIC BLOOD PRESSURE: 85 MMHG | HEART RATE: 64 BPM | OXYGEN SATURATION: 91 % | SYSTOLIC BLOOD PRESSURE: 159 MMHG | HEIGHT: 63 IN | BODY MASS INDEX: 16.74 KG/M2 | RESPIRATION RATE: 18 BRPM | TEMPERATURE: 97.8 F

## 2021-02-17 DIAGNOSIS — D62 ANEMIA DUE TO BLOOD LOSS, ACUTE: ICD-10-CM

## 2021-02-17 DIAGNOSIS — F03.90 SENILE DEMENTIA WITHOUT BEHAVIORAL DISTURBANCE (H): ICD-10-CM

## 2021-02-17 DIAGNOSIS — S72.001D TRAUMATIC FRACTURE OF RIGHT HIP WITH ROUTINE HEALING: Primary | ICD-10-CM

## 2021-02-17 DIAGNOSIS — I10 ESSENTIAL HYPERTENSION: ICD-10-CM

## 2021-02-17 DIAGNOSIS — Z87.81 S/P ORIF (OPEN REDUCTION INTERNAL FIXATION) FRACTURE: ICD-10-CM

## 2021-02-17 DIAGNOSIS — Z98.890 S/P ORIF (OPEN REDUCTION INTERNAL FIXATION) FRACTURE: ICD-10-CM

## 2021-02-17 DIAGNOSIS — S70.11XD HEMATOMA OF RIGHT THIGH, SUBSEQUENT ENCOUNTER: ICD-10-CM

## 2021-02-17 DIAGNOSIS — R33.9 URINARY RETENTION: ICD-10-CM

## 2021-02-17 PROCEDURE — 99310 SBSQ NF CARE HIGH MDM 45: CPT | Performed by: NURSE PRACTITIONER

## 2021-02-17 NOTE — PROGRESS NOTES
"[place] [facility]  PRIMARY CARE PROVIDER AND CLINIC:  [facility], [address] [place] [address], [place] [id number]  Chief Complaint   Patient presents with     Hospital F/U     [place] Medical Record Number:  [id number]  Place of Service where encounter took place:  [facility] (TCU) [25]    Jean M Topitzhofer  is a 86 year old  (7/4/1934), admitted to the above facility from  [facility]. Hospital stay 2/13/21 through 2/16/21..  Admitted to this facility for  rehab, medical management and nursing care.    HPI:    HPI information obtained from: facility chart records, facility staff, patient report and [facility] chart review.   Brief Summary of Hospital Course:   PMH of HTN and dementia who presents after a fall with hip pain.   Right intratrochanteric femur Fx s/p ORIF on 2/14/21  Anemia: Hgb 14.6-- 6.9 received 2 units PRBC, thigh hematoma secondary to Fx.   HTN: stable on PTA meds  Dementia: ongoing  Right lung opacity: incidental finding which has changed from 12/2014, f/u as OP, chest CT recommended  Updates on Status Since Skilled nursing Admission: On exam today patient is sitting up in WC, alert, pleasant, denies pain at time of exam, states she \"sometimes shooting pain\" referring to right leg, denies SOB, cough, congestion, SOB, N/V/D states she had a BM this AM.   Nursing report patient has not reported any pain, last night had urinary retention requiring straight cath with output of 700cc.     CODE STATUS/ADVANCE DIRECTIVES DISCUSSION:   No CPR- Do NOT Intubate  Patient's living condition: lives alone  ALLERGIES: Patient has no known allergies.  PAST MEDICAL HISTORY:  has a past medical history of Benign essential hypertension and Dementia (H).  PAST SURGICAL HISTORY:   has a past surgical history that includes Abdomen surgery (1980s) and Open reduction internal fixation rodding intramedullar femur fracture table (Right, 2/14/2021).  FAMILY " HISTORY: family history includes Aneurysm in her son; Diabetes in her maternal grandmother; Hypertension in her mother.  SOCIAL HISTORY:   reports that she has never smoked. She has never used smokeless tobacco. She reports that she does not drink alcohol or use drugs.    Post Discharge Medication Reconciliation Status: discharge medications reconciled, continue medications without change    Current Outpatient Medications   Medication Sig Dispense Refill     acetaminophen (TYLENOL) 325 MG tablet Take 650 mg by mouth every 8 hours as needed for mild pain       aspirin (ASA) 325 MG EC tablet Take 1 tablet (325 mg) by mouth daily 30 tablet 0     calcium carbonate (ANTACID EXTRA STRENGTH) 750 MG CHEW Take 1,500 mg by mouth daily as needed       dorzolamide-timolol (COSOPT) 2-0.5 % ophthalmic solution Place 1 drop into the right eye every morning        Emollient (CERAVE) CREA Externally apply topically daily To boy legs       Eyelid Cleansers (AVENOVA EX) Externally apply 1 spray topically daily Spray to closed eyes covering eye lids       ferrous fumarate-vitamin C ER (SHANTE-SEQUELS) 65-25 MG CR tablet Take 1 tablet by mouth daily (with breakfast)       guaiFENesin (ROBITUSSIN) 100 MG/5ML SYRP Take 10 mLs by mouth every 4 hours as needed for cough       hypromellose (ARTIFICIAL TEARS) 0.5 % SOLN ophthalmic solution Place 2 drops into both eyes 3 times daily       latanoprost (XALATAN) 0.005 % ophthalmic solution Place 1 drop into both eyes At Bedtime   4     Multiple Vitamins-Minerals (CENTRUM) TABS Take 1 tablet by mouth daily 30 tablet 0     polyethylene glycol (MIRALAX) 17 g packet Take 17 g by mouth daily as needed for constipation       tamsulosin (FLOMAX) 0.4 MG capsule Take 1 capsule (0.4 mg) by mouth daily       traMADol (ULTRAM) 50 MG tablet Take 1 tablet (50 mg) by mouth every 6 hours as needed for moderate to severe pain 40 tablet 0     Vitamin D3 (CHOLECALCIFEROL) 25 mcg (1000 units) tablet Take 2 tablets  "(50 mcg) by mouth daily 60 tablet 0         ROS:  10 point ROS of systems including Constitutional, Eyes, Respiratory, Cardiovascular, Gastroenterology, Genitourinary, Integumentary, Musculoskeletal, Psychiatric were all negative except for pertinent positives noted in my HPI.    Vitals:  BP (!) 159/85   Pulse 64   Temp 97.8  F (36.6  C)   Resp 18   Ht 1.6 m (5' 3\")   LMP  (LMP Unknown)   SpO2 91%   BMI 16.74 kg/m    Exam:  GENERAL APPEARANCE:  Alert, in no distress  ENT:  Mouth and posterior oropharynx normal, moist mucous membranes, [place]  EYES:  EOM, conjunctivae, lids, pupils and irises normal, PERRL  RESP:  respiratory effort and palpation of chest normal, lungs clear to auscultation , no respiratory distress  CV:  Palpation and auscultation of heart done , regular rate and rhythm, no murmur, rub, or gallop, no edema  ABDOMEN:  normal bowel sounds, soft, nontender, no hepatosplenomegaly or other masses  M/S:   patient sitting up in WC, able to move all 4 extremities  SKIN:  Inspection of skin and subcutaneous tissue baseline, did not visualize right hip incision, swelling noted to right thigh area when compared to left thigh  NEURO:   speech WNL  PSYCH:  affect and mood normal    Lab/Diagnostic data:    Most Recent 3 CBC's:  Recent Labs   Lab Test 02/16/21  0713 02/15/21  1750 02/15/21  0644 02/14/21  0643 02/13/21  1419   WBC 11.1*  --   --  13.2* 9.9   HGB 8.4* 9.8* 6.8* 9.2* 14.6     --   --  102* 101*   *  --   --  168 250     Most Recent 3 BMP's:  Recent Labs   Lab Test 02/16/21  0713 02/15/21  0644 02/14/21  0643 02/13/21  1419     --  140 140   POTASSIUM 4.1  --  4.5 4.5   CHLORIDE 109  --  110* 108   CO2 25  --  25 27   BUN 27  --  24 13   CR 0.77  --  0.98 0.68   ANIONGAP 4  --  5 5   TOBY 7.6*  --  8.1* 9.0   GLC 85 115* 109* 114*       ASSESSMENT/PLAN:  Traumatic fracture of right hip with routine healing  S/P ORIF (open reduction internal fixation) fracture  Acute/ongoing: " WBAT, PT and OT  Schedule tylenol 1000mg TID and qhs prn, continue tramadol 50mg q 6 hours prn, ASA 325mg QD  F/u with ortho as directed    Anemia due to blood loss, acute  Hematoma of right thigh, subsequent encounter  Acute/ongoing: Hgb on Friday and Monday, follow, continue Iron supplement QAM    Essential hypertension  Ongoing: vitals daily and prn, BMP follow, continue to follow off Rx    Senile dementia without behavioral disturbance (H)  Ongoing: will return to LTC at discharge    Urinary retention  Acute: PVR q shift and straight cath for PVR > 350cc, continue flomax 0.4mg    QD    Orders written by provider at facility  BMP on Monday  Hgb Friday and Monday  Tylenol 1000mg TID scheduled and qhs prn  PVR q shift straight cath for PVR > 350cc    Total time spent with patient visit at the skilled nursing facility was 35 min including patient visit and review of past records. Greater than 50% of total time spent with counseling and coordinating care due to discussed pain medication and scheduling tylenol, education on using Ice prn for swelling and hematoma, discussed with nurse urinary retention, will continue to monitor closely and PVR.     Electronically signed by:  Tonya Lynn Haase, APRN CNP

## 2021-02-17 NOTE — LETTER
"    2/17/2021        RE: Jean M Topitzhofer  [address]  [facility] [address]        [place] [facility]  PRIMARY CARE PROVIDER AND CLINIC:  [facility], [address] [place] [id number]  Chief Complaint   Patient presents with     Hospital F/U     [place] Medical Record Number:  [id number]  Place of Service where encounter took place:  [facility] (TCU) [25]    Jean M Topitzhofer  is a 86 year old  (7/4/1934), admitted to the above facility from  [facility]. Hospital stay 2/13/21 through 2/16/21..  Admitted to this facility for  rehab, medical management and nursing care.    HPI:    HPI information obtained from: facility chart records, facility staff, patient report and [facility] chart review.   Brief Summary of Hospital Course:   PMH of HTN and dementia who presents after a fall with hip pain.   Right intratrochanteric femur Fx s/p ORIF on 2/14/21  Anemia: Hgb 14.6-- 6.9 received 2 units PRBC, thigh hematoma secondary to Fx.   HTN: stable on PTA meds  Dementia: ongoing  Right lung opacity: incidental finding which has changed from 12/2014, f/u as OP, chest CT recommended  Updates on Status Since Skilled nursing Admission: On exam today patient is sitting up in WC, alert, pleasant, denies pain at time of exam, states she \"sometimes shooting pain\" referring to right leg, denies SOB, cough, congestion, SOB, N/V/D states she had a BM this AM.   Nursing report patient has not reported any pain, last night had urinary retention requiring straight cath with output of 700cc.     CODE STATUS/ADVANCE DIRECTIVES DISCUSSION:   No CPR- Do NOT Intubate  Patient's living condition: lives alone  ALLERGIES: Patient has no known allergies.  PAST MEDICAL HISTORY:  has a past medical history of Benign essential hypertension and Dementia (H).  PAST SURGICAL HISTORY:   has a past surgical history that includes Abdomen surgery (1980s) and Open " reduction internal fixation rodding intramedullar femur fracture table (Right, 2/14/2021).  FAMILY HISTORY: family history includes Aneurysm in her son; Diabetes in her maternal grandmother; Hypertension in her mother.  SOCIAL HISTORY:   reports that she has never smoked. She has never used smokeless tobacco. She reports that she does not drink alcohol or use drugs.    Post Discharge Medication Reconciliation Status: discharge medications reconciled, continue medications without change    Current Outpatient Medications   Medication Sig Dispense Refill     acetaminophen (TYLENOL) 325 MG tablet Take 650 mg by mouth every 8 hours as needed for mild pain       aspirin (ASA) 325 MG EC tablet Take 1 tablet (325 mg) by mouth daily 30 tablet 0     calcium carbonate (ANTACID EXTRA STRENGTH) 750 MG CHEW Take 1,500 mg by mouth daily as needed       dorzolamide-timolol (COSOPT) 2-0.5 % ophthalmic solution Place 1 drop into the right eye every morning        Emollient (CERAVE) CREA Externally apply topically daily To boy legs       Eyelid Cleansers (AVENOVA EX) Externally apply 1 spray topically daily Spray to closed eyes covering eye lids       ferrous fumarate-vitamin C ER (SHANTE-SEQUELS) 65-25 MG CR tablet Take 1 tablet by mouth daily (with breakfast)       guaiFENesin (ROBITUSSIN) 100 MG/5ML SYRP Take 10 mLs by mouth every 4 hours as needed for cough       hypromellose (ARTIFICIAL TEARS) 0.5 % SOLN ophthalmic solution Place 2 drops into both eyes 3 times daily       latanoprost (XALATAN) 0.005 % ophthalmic solution Place 1 drop into both eyes At Bedtime   4     Multiple Vitamins-Minerals (CENTRUM) TABS Take 1 tablet by mouth daily 30 tablet 0     polyethylene glycol (MIRALAX) 17 g packet Take 17 g by mouth daily as needed for constipation       tamsulosin (FLOMAX) 0.4 MG capsule Take 1 capsule (0.4 mg) by mouth daily       traMADol (ULTRAM) 50 MG tablet Take 1 tablet (50 mg) by mouth every 6 hours as needed for moderate to  "severe pain 40 tablet 0     Vitamin D3 (CHOLECALCIFEROL) 25 mcg (1000 units) tablet Take 2 tablets (50 mcg) by mouth daily 60 tablet 0         ROS:  10 point ROS of systems including Constitutional, Eyes, Respiratory, Cardiovascular, Gastroenterology, Genitourinary, Integumentary, Musculoskeletal, Psychiatric were all negative except for pertinent positives noted in my HPI.    Vitals:  BP (!) 159/85   Pulse 64   Temp 97.8  F (36.6  C)   Resp 18   Ht 1.6 m (5' 3\")   LMP  (LMP Unknown)   SpO2 91%   BMI 16.74 kg/m    Exam:  GENERAL APPEARANCE:  Alert, in no distress  ENT:  Mouth and posterior oropharynx normal, moist mucous membranes, [place]  EYES:  EOM, conjunctivae, lids, pupils and irises normal, PERRL  RESP:  respiratory effort and palpation of chest normal, lungs clear to auscultation , no respiratory distress  CV:  Palpation and auscultation of heart done , regular rate and rhythm, no murmur, rub, or gallop, no edema  ABDOMEN:  normal bowel sounds, soft, nontender, no hepatosplenomegaly or other masses  M/S:   patient sitting up in WC, able to move all 4 extremities  SKIN:  Inspection of skin and subcutaneous tissue baseline, did not visualize right hip incision, swelling noted to right thigh area when compared to left thigh  NEURO:   speech WNL  PSYCH:  affect and mood normal    Lab/Diagnostic data:    Most Recent 3 CBC's:  Recent Labs   Lab Test 02/16/21  0713 02/15/21  1750 02/15/21  0644 02/14/21  0643 02/13/21  1419   WBC 11.1*  --   --  13.2* 9.9   HGB 8.4* 9.8* 6.8* 9.2* 14.6     --   --  102* 101*   *  --   --  168 250     Most Recent 3 BMP's:  Recent Labs   Lab Test 02/16/21  0713 02/15/21  0644 02/14/21  0643 02/13/21  1419     --  140 140   POTASSIUM 4.1  --  4.5 4.5   CHLORIDE 109  --  110* 108   CO2 25  --  25 27   BUN 27  --  24 13   CR 0.77  --  0.98 0.68   ANIONGAP 4  --  5 5   TOBY 7.6*  --  8.1* 9.0   GLC 85 115* 109* 114*       ASSESSMENT/PLAN:  Traumatic fracture of " right hip with routine healing  S/P ORIF (open reduction internal fixation) fracture  Acute/ongoing: WBAT, PT and OT  Schedule tylenol 1000mg TID and qhs prn, continue tramadol 50mg q 6 hours prn, ASA 325mg QD  F/u with ortho as directed    Anemia due to blood loss, acute  Hematoma of right thigh, subsequent encounter  Acute/ongoing: Hgb on Friday and Monday, follow, continue Iron supplement QAM    Essential hypertension  Ongoing: vitals daily and prn, BMP follow, continue to follow off Rx    Senile dementia without behavioral disturbance (H)  Ongoing: will return to LTC at discharge    Urinary retention  Acute: PVR q shift and straight cath for PVR > 350cc, continue flomax 0.4mg    QD    Orders written by provider at facility  BMP on Monday  Hgb Friday and Monday  Tylenol 1000mg TID scheduled and qhs prn  PVR q shift straight cath for PVR > 350cc    Total time spent with patient visit at the skilled nursing facility was 35 min including patient visit and review of past records. Greater than 50% of total time spent with counseling and coordinating care due to discussed pain medication and scheduling tylenol, education on using Ice prn for swelling and hematoma, discussed with nurse urinary retention, will continue to monitor closely and PVR.     Electronically signed by:  Tonya Lynn Haase, APRN CNP                         Sincerely,        Tonya Lynn Haase, APRN CNP

## 2021-02-17 NOTE — PLAN OF CARE
Physical Therapy Discharge Summary    Reason for therapy discharge:    Discharged to transitional care facility.    Progress towards therapy goal(s). See goals on Care Plan in [facility] electronic health record for goal details.  Goals not met.  Barriers to achieving goals:   discharge from facility.    Therapy recommendation(s):    Continued therapy is recommended.  Rationale/Recommendations:  TCU to maximize return to PLOF.

## 2021-02-18 ENCOUNTER — AMBULATORY - HEALTHEAST (OUTPATIENT)
Dept: OTHER | Facility: CLINIC | Age: 86
End: 2021-02-18

## 2021-02-18 ENCOUNTER — DOCUMENTATION ONLY (OUTPATIENT)
Dept: OTHER | Facility: CLINIC | Age: 86
End: 2021-02-18

## 2021-02-18 ASSESSMENT — MIFFLIN-ST. JEOR: SCORE: 977.03

## 2021-02-19 ENCOUNTER — NURSING HOME VISIT (OUTPATIENT)
Dept: GERIATRICS | Facility: CLINIC | Age: 86
End: 2021-02-19
Payer: MEDICARE

## 2021-02-19 VITALS
SYSTOLIC BLOOD PRESSURE: 108 MMHG | HEIGHT: 63 IN | DIASTOLIC BLOOD PRESSURE: 76 MMHG | WEIGHT: 125.2 LBS | RESPIRATION RATE: 16 BRPM | TEMPERATURE: 97.2 F | OXYGEN SATURATION: 98 % | BODY MASS INDEX: 22.18 KG/M2 | HEART RATE: 88 BPM

## 2021-02-19 DIAGNOSIS — Z98.890 S/P ORIF (OPEN REDUCTION INTERNAL FIXATION) FRACTURE: ICD-10-CM

## 2021-02-19 DIAGNOSIS — S70.11XD HEMATOMA OF RIGHT THIGH, SUBSEQUENT ENCOUNTER: ICD-10-CM

## 2021-02-19 DIAGNOSIS — S72.001D TRAUMATIC FRACTURE OF RIGHT HIP WITH ROUTINE HEALING: Primary | ICD-10-CM

## 2021-02-19 DIAGNOSIS — Z87.81 S/P ORIF (OPEN REDUCTION INTERNAL FIXATION) FRACTURE: ICD-10-CM

## 2021-02-19 DIAGNOSIS — F03.90 SENILE DEMENTIA WITHOUT BEHAVIORAL DISTURBANCE (H): ICD-10-CM

## 2021-02-19 DIAGNOSIS — I10 ESSENTIAL HYPERTENSION: ICD-10-CM

## 2021-02-19 DIAGNOSIS — R33.9 URINARY RETENTION: ICD-10-CM

## 2021-02-19 DIAGNOSIS — D62 ANEMIA DUE TO BLOOD LOSS, ACUTE: ICD-10-CM

## 2021-02-19 PROCEDURE — 99309 SBSQ NF CARE MODERATE MDM 30: CPT | Performed by: NURSE PRACTITIONER

## 2021-02-19 NOTE — LETTER
2/19/2021        RE: Jean M Topitzhofer  [address]  [facility] [address]        [place] [facility]  [place] Medical Record Number:  [id number]  Place of Service where encounter took place:  [facility] (U) [25]  Chief Complaint   Patient presents with     Nursing Home Acute       HPI:    Jean M Topitzhofer  is a 86 year old (7/4/1934), who is being seen today for an episodic care visit.  HPI information obtained from: facility chart records, facility staff, patient report and [facility] chart review. Today's concern is:  Right hip Fx: ON exam today patient sitting up in WC, denies pain in right hip at time of exam, offers very little for ROS, in therapy patient is assist with stand pivot transfers, total assist with ADL's.   Anemia: follow  HTN: BP as follows: 108/76, 139/83, 159/85 with HR 80's denies CP, palpitations, SOB  Dementia: BIMS 6/15, SBT 21/28    Past Medical and Surgical History reviewed in Epic today.    MEDICATIONS:    Current Outpatient Medications   Medication Sig Dispense Refill     acetaminophen (TYLENOL) 325 MG tablet Take 1,000 mg by mouth 3 times daily And 1000mg qhs prn       aspirin (ASA) 325 MG EC tablet Take 1 tablet (325 mg) by mouth daily 30 tablet 0     calcium carbonate (ANTACID EXTRA STRENGTH) 750 MG CHEW Take 1,500 mg by mouth daily as needed       dorzolamide-timolol (COSOPT) 2-0.5 % ophthalmic solution Place 1 drop into the right eye every morning        Emollient (CERAVE) CREA Externally apply topically daily To boy legs       Eyelid Cleansers (AVENOVA EX) Externally apply 1 spray topically daily Spray to closed eyes covering eye lids       ferrous fumarate-vitamin C ER (SHANTE-SEQUELS) 65-25 MG CR tablet Take 1 tablet by mouth daily (with breakfast)       guaiFENesin (ROBITUSSIN) 100 MG/5ML SYRP Take 10 mLs by mouth every 4 hours as needed for cough       hypromellose (ARTIFICIAL TEARS) 0.5 % SOLN ophthalmic solution Place 2 drops into  "both eyes 3 times daily       latanoprost (XALATAN) 0.005 % ophthalmic solution Place 1 drop into both eyes At Bedtime   4     Multiple Vitamins-Minerals (CENTRUM) TABS Take 1 tablet by mouth daily 30 tablet 0     polyethylene glycol (MIRALAX) 17 g packet Take 17 g by mouth daily as needed for constipation       tamsulosin (FLOMAX) 0.4 MG capsule Take 1 capsule (0.4 mg) by mouth daily       traMADol (ULTRAM) 50 MG tablet Take 1 tablet (50 mg) by mouth every 6 hours as needed for moderate to severe pain 40 tablet 0     Vitamin D3 (CHOLECALCIFEROL) 25 mcg (1000 units) tablet Take 2 tablets (50 mcg) by mouth daily 60 tablet 0         REVIEW OF SYSTEMS:  10 point ROS of systems including Constitutional, Eyes, Respiratory, Cardiovascular, Gastroenterology, Genitourinary, Integumentary, Musculoskeletal, Psychiatric were all negative except for pertinent positives noted in my HPI.    Objective:  /76   Pulse 88   Temp 97.2  F (36.2  C)   Resp 16   Ht 1.6 m (5' 3\")   Wt 56.8 kg (125 lb 3.2 oz)   LMP  (LMP Unknown)   SpO2 98%   BMI 22.18 kg/m    Exam:  GENERAL APPEARANCE:  Alert, in no distress  ENT:  Mouth and posterior oropharynx normal, moist mucous membranes, [place]  EYES:  EOM, conjunctivae, lids, pupils and irises normal, PERRL  RESP:  respiratory effort and palpation of chest normal, lungs clear to auscultation , no respiratory distress  CV:  Palpation and auscultation of heart done , regular rate and rhythm, no murmur, rub, or gallop, no edema  ABDOMEN:  normal bowel sounds, soft, nontender, no hepatosplenomegaly or other masses  M/S:   patient sitting up in WC, able to move all 4 extremities  SKIN:  Inspection of skin and subcutaneous tissue baseline, did not visualize right hip incision, swelling noted to right thigh area when compared to left thigh  NEURO:   speech WNL  PSYCH:  affect and mood normal    Labs:     Most Recent 3 CBC's:  Recent Labs   Lab Test 02/16/21  0713 02/15/21  1750 02/15/21  0644 " 02/14/21  0643 02/13/21  1419   WBC 11.1*  --   --  13.2* 9.9   HGB 8.4* 9.8* 6.8* 9.2* 14.6     --   --  102* 101*   *  --   --  168 250     Most Recent 3 BMP's:  Recent Labs   Lab Test 02/16/21  0713 02/15/21  0644 02/14/21  0643 02/13/21  1419     --  140 140   POTASSIUM 4.1  --  4.5 4.5   CHLORIDE 109  --  110* 108   CO2 25  --  25 27   BUN 27  --  24 13   CR 0.77  --  0.98 0.68   ANIONGAP 4  --  5 5   TOBY 7.6*  --  8.1* 9.0   GLC 85 115* 109* 114*       ASSESSMENT/PLAN:  Traumatic fracture of right hip with routine healing  S/P ORIF (open reduction internal fixation) fracture  Acute/ongoing: WBAT, PT and OT  continue tylenol 1000mg TID and qhs prn, continue tramadol 50mg q 6 hours prn, ASA 325mg QD  F/u with ortho as directed     Anemia due to blood loss, acute  Hematoma of right thigh, subsequent encounter  Acute/ongoing: pending today,  and Monday, follow, continue Iron supplement QAM     Essential hypertension  Ongoing: vitals daily and prn, BMP follow, continue to follow off Rx     Senile dementia without behavioral disturbance (H)  Ongoing: will return to LTC at discharge     Urinary retention  Acute: PVR q shift and straight cath for PVR > 350cc, continue flomax 0.4mg    QD       Orders written by provider at facility  No new orders today      Electronically signed by:  Tonya Lynn Haase, APRN CNP               Sincerely,        Tonya Lynn Haase, APRN CNP

## 2021-02-19 NOTE — PROGRESS NOTES
[place] [facility]  [place] Medical Record Number:  [id number]  Place of Service where encounter took place:  [facility] (U) [25]  Chief Complaint   Patient presents with     Nursing Home Acute       HPI:    Jean M Topitzhofer  is a 86 year old (7/4/1934), who is being seen today for an episodic care visit.  HPI information obtained from: facility chart records, facility staff, patient report and [facility] chart review. Today's concern is:  Right hip Fx: ON exam today patient sitting up in WC, denies pain in right hip at time of exam, offers very little for ROS, in therapy patient is assist with stand pivot transfers, total assist with ADL's.   Anemia: follow  HTN: BP as follows: 108/76, 139/83, 159/85 with HR 80's denies CP, palpitations, SOB  Dementia: BIMS 6/15, SBT 21/28    Past Medical and Surgical History reviewed in Epic today.    MEDICATIONS:    Current Outpatient Medications   Medication Sig Dispense Refill     acetaminophen (TYLENOL) 325 MG tablet Take 1,000 mg by mouth 3 times daily And 1000mg qhs prn       aspirin (ASA) 325 MG EC tablet Take 1 tablet (325 mg) by mouth daily 30 tablet 0     calcium carbonate (ANTACID EXTRA STRENGTH) 750 MG CHEW Take 1,500 mg by mouth daily as needed       dorzolamide-timolol (COSOPT) 2-0.5 % ophthalmic solution Place 1 drop into the right eye every morning        Emollient (CERAVE) CREA Externally apply topically daily To boy legs       Eyelid Cleansers (AVENOVA EX) Externally apply 1 spray topically daily Spray to closed eyes covering eye lids       ferrous fumarate-vitamin C ER (SHANTE-SEQUELS) 65-25 MG CR tablet Take 1 tablet by mouth daily (with breakfast)       guaiFENesin (ROBITUSSIN) 100 MG/5ML SYRP Take 10 mLs by mouth every 4 hours as needed for cough       hypromellose (ARTIFICIAL TEARS) 0.5 % SOLN ophthalmic solution Place 2 drops into both eyes 3 times daily       latanoprost (XALATAN) 0.005 % ophthalmic solution Place 1 drop into  "both eyes At Bedtime   4     Multiple Vitamins-Minerals (CENTRUM) TABS Take 1 tablet by mouth daily 30 tablet 0     polyethylene glycol (MIRALAX) 17 g packet Take 17 g by mouth daily as needed for constipation       tamsulosin (FLOMAX) 0.4 MG capsule Take 1 capsule (0.4 mg) by mouth daily       traMADol (ULTRAM) 50 MG tablet Take 1 tablet (50 mg) by mouth every 6 hours as needed for moderate to severe pain 40 tablet 0     Vitamin D3 (CHOLECALCIFEROL) 25 mcg (1000 units) tablet Take 2 tablets (50 mcg) by mouth daily 60 tablet 0         REVIEW OF SYSTEMS:  10 point ROS of systems including Constitutional, Eyes, Respiratory, Cardiovascular, Gastroenterology, Genitourinary, Integumentary, Musculoskeletal, Psychiatric were all negative except for pertinent positives noted in my HPI.    Objective:  /76   Pulse 88   Temp 97.2  F (36.2  C)   Resp 16   Ht 1.6 m (5' 3\")   Wt 56.8 kg (125 lb 3.2 oz)   LMP  (LMP Unknown)   SpO2 98%   BMI 22.18 kg/m    Exam:  GENERAL APPEARANCE:  Alert, in no distress  ENT:  Mouth and posterior oropharynx normal, moist mucous membranes, [place]  EYES:  EOM, conjunctivae, lids, pupils and irises normal, PERRL  RESP:  respiratory effort and palpation of chest normal, lungs clear to auscultation , no respiratory distress  CV:  Palpation and auscultation of heart done , regular rate and rhythm, no murmur, rub, or gallop, no edema  ABDOMEN:  normal bowel sounds, soft, nontender, no hepatosplenomegaly or other masses  M/S:   patient sitting up in WC, able to move all 4 extremities  SKIN:  Inspection of skin and subcutaneous tissue baseline, did not visualize right hip incision, swelling noted to right thigh area when compared to left thigh  NEURO:   speech WNL  PSYCH:  affect and mood normal    Labs:     Most Recent 3 CBC's:  Recent Labs   Lab Test 02/16/21  0713 02/15/21  1750 02/15/21  0644 02/14/21  0643 02/13/21  1419   WBC 11.1*  --   --  13.2* 9.9   HGB 8.4* 9.8* 6.8* 9.2* 14.6   MCV " 100  --   --  102* 101*   *  --   --  168 250     Most Recent 3 BMP's:  Recent Labs   Lab Test 02/16/21  0713 02/15/21  0644 02/14/21  0643 02/13/21  1419     --  140 140   POTASSIUM 4.1  --  4.5 4.5   CHLORIDE 109  --  110* 108   CO2 25  --  25 27   BUN 27  --  24 13   CR 0.77  --  0.98 0.68   ANIONGAP 4  --  5 5   TOBY 7.6*  --  8.1* 9.0   GLC 85 115* 109* 114*       ASSESSMENT/PLAN:  Traumatic fracture of right hip with routine healing  S/P ORIF (open reduction internal fixation) fracture  Acute/ongoing: WBAT, PT and OT  continue tylenol 1000mg TID and qhs prn, continue tramadol 50mg q 6 hours prn, ASA 325mg QD  F/u with ortho as directed     Anemia due to blood loss, acute  Hematoma of right thigh, subsequent encounter  Acute/ongoing: pending today,  and Monday, follow, continue Iron supplement QAM     Essential hypertension  Ongoing: vitals daily and prn, BMP follow, continue to follow off Rx     Senile dementia without behavioral disturbance (H)  Ongoing: will return to LTC at discharge     Urinary retention  Acute: PVR q shift and straight cath for PVR > 350cc, continue flomax 0.4mg    QD       Orders written by provider at facility  No new orders today      Electronically signed by:  Tonya Lynn Haase, APRN CNP

## 2021-02-22 ENCOUNTER — TRANSFERRED RECORDS (OUTPATIENT)
Dept: HEALTH INFORMATION MANAGEMENT | Facility: CLINIC | Age: 86
End: 2021-02-22

## 2021-02-22 LAB
ANION GAP SERPL CALCULATED.3IONS-SCNC: 8 MMOL/L (ref 7–16)
BUN SERPL-MCNC: 15 MG/DL (ref 7–26)
CALCIUM SERPL-MCNC: 8.2 MG/DL (ref 8.4–10.4)
CHLORIDE SERPLBLD-SCNC: 107 MMOL/L (ref 98–109)
CO2 SERPL-SCNC: 27 MMOL/L (ref 20–29)
CREAT SERPL-MCNC: 0.57 MG/DL (ref 0.55–1.02)
GFR SERPL CREATININE-BSD FRML MDRD: >60 ML/MIN/1.73M2
GLUCOSE SERPL-MCNC: 83 MG/DL (ref 70–100)
HEMOGLOBIN: 10.9 G/DL (ref 12–15.5)
POTASSIUM SERPL-SCNC: 3.8 MMOL/L (ref 3.5–5.1)
SODIUM SERPL-SCNC: 142 MMOL/L (ref 136–145)

## 2021-02-24 ENCOUNTER — NURSING HOME VISIT (OUTPATIENT)
Dept: GERIATRICS | Facility: CLINIC | Age: 86
End: 2021-02-24
Payer: MEDICARE

## 2021-02-24 VITALS
RESPIRATION RATE: 16 BRPM | HEART RATE: 73 BPM | TEMPERATURE: 99.1 F | OXYGEN SATURATION: 96 % | SYSTOLIC BLOOD PRESSURE: 138 MMHG | DIASTOLIC BLOOD PRESSURE: 80 MMHG

## 2021-02-24 DIAGNOSIS — D62 ANEMIA DUE TO BLOOD LOSS, ACUTE: ICD-10-CM

## 2021-02-24 DIAGNOSIS — Z98.890 S/P ORIF (OPEN REDUCTION INTERNAL FIXATION) FRACTURE: ICD-10-CM

## 2021-02-24 DIAGNOSIS — S70.11XD HEMATOMA OF RIGHT THIGH, SUBSEQUENT ENCOUNTER: ICD-10-CM

## 2021-02-24 DIAGNOSIS — Z87.81 S/P ORIF (OPEN REDUCTION INTERNAL FIXATION) FRACTURE: ICD-10-CM

## 2021-02-24 DIAGNOSIS — S72.001D TRAUMATIC FRACTURE OF RIGHT HIP WITH ROUTINE HEALING: Primary | ICD-10-CM

## 2021-02-24 DIAGNOSIS — F03.90 SENILE DEMENTIA WITHOUT BEHAVIORAL DISTURBANCE (H): ICD-10-CM

## 2021-02-24 DIAGNOSIS — R41.0 CONFUSION: ICD-10-CM

## 2021-02-24 DIAGNOSIS — R33.9 URINARY RETENTION: ICD-10-CM

## 2021-02-24 DIAGNOSIS — I10 ESSENTIAL HYPERTENSION: ICD-10-CM

## 2021-02-24 PROCEDURE — 99309 SBSQ NF CARE MODERATE MDM 30: CPT | Performed by: NURSE PRACTITIONER

## 2021-02-24 NOTE — PROGRESS NOTES
[place] [facility]  [place] Medical Record Number:  [id number]  Place of Service where encounter took place:  [facility] (U) [25]  Chief Complaint   Patient presents with     Nursing Home Acute       HPI:    Jean M Topitzhofer  is a 86 year old (7/4/1934), who is being seen today for an episodic care visit.  HPI information obtained from: facility chart records, facility staff, patient report and [facility] chart review. Today's concern is:  Right hip Fx: ON exam today patient sitting up in WC, no c/o pain at time of exam, in therapy patient is walking up to 20 feet using a RW, CGA,    Anemia: Hgb 10.9 on 2/22/21  HTN: BP as follows: 138/80, 136/73, 140/81  with HR 70-80's denies CP, palpitations, SOB  Dementia: BIMS 6/15, SBT 21/28, ROS difficult due to cognitive impairement    Past Medical and Surgical History reviewed in Epic today.    MEDICATIONS:    Current Outpatient Medications   Medication Sig Dispense Refill     acetaminophen (TYLENOL) 325 MG tablet Take 1,000 mg by mouth 3 times daily And 1000mg qhs prn       aspirin (ASA) 325 MG EC tablet Take 1 tablet (325 mg) by mouth daily 30 tablet 0     calcium carbonate (ANTACID EXTRA STRENGTH) 750 MG CHEW Take 1,500 mg by mouth daily as needed       dorzolamide-timolol (COSOPT) 2-0.5 % ophthalmic solution Place 1 drop into the right eye every morning        Emollient (CERAVE) CREA Externally apply topically daily To boy legs       Eyelid Cleansers (AVENOVA EX) Externally apply 1 spray topically daily Spray to closed eyes covering eye lids       ferrous fumarate-vitamin C ER (SHANTE-SEQUELS) 65-25 MG CR tablet Take 1 tablet by mouth daily (with breakfast)       guaiFENesin (ROBITUSSIN) 100 MG/5ML SYRP Take 10 mLs by mouth every 4 hours as needed for cough       hypromellose (ARTIFICIAL TEARS) 0.5 % SOLN ophthalmic solution Place 2 drops into both eyes 3 times daily       latanoprost (XALATAN) 0.005 % ophthalmic solution Place 1 drop into  both eyes At Bedtime   4     Multiple Vitamins-Minerals (CENTRUM) TABS Take 1 tablet by mouth daily 30 tablet 0     polyethylene glycol (MIRALAX) 17 g packet Take 17 g by mouth daily as needed for constipation       tamsulosin (FLOMAX) 0.4 MG capsule Take 1 capsule (0.4 mg) by mouth daily       traMADol (ULTRAM) 50 MG tablet Take 1 tablet (50 mg) by mouth every 6 hours as needed for moderate to severe pain 40 tablet 0     Vitamin D3 (CHOLECALCIFEROL) 25 mcg (1000 units) tablet Take 2 tablets (50 mcg) by mouth daily 60 tablet 0         REVIEW OF SYSTEMS:  Unobtainable secondary to cognitive impairment.     Objective:  /80   Pulse 73   Temp 99.1  F (37.3  C)   Resp 16   LMP  (LMP Unknown)   SpO2 96%   Exam:  GENERAL APPEARANCE:  Alert, in no distress  ENT:  Mouth and posterior oropharynx normal, moist mucous membranes, [place]  EYES:  EOM, conjunctivae, lids, pupils and irises normal, PERRL  RESP:  respiratory effort and palpation of chest normal, lungs clear to auscultation , no respiratory distress  CV:  Palpation and auscultation of heart done , regular rate and rhythm, no murmur, rub, or gallop, no edema  ABDOMEN:  normal bowel sounds, soft, nontender, no hepatosplenomegaly or other masses  M/S:   patient sitting up in WC, able to move all 4 extremities  SKIN:  Inspection of skin and subcutaneous tissue baseline, did not visualize right hip incision, swelling noted to right thigh area when compared to left thigh  NEURO:   speech WNL  PSYCH:  affect and mood normal       Labs:     Most Recent 3 CBC's:  Recent Labs   Lab Test 02/22/21 02/16/21  0713 02/15/21  1750 02/14/21  0643 02/14/21  0643 02/13/21  1419   WBC  --  11.1*  --   --  13.2* 9.9   HGB 10.9* 8.4* 9.8*   < > 9.2* 14.6   MCV  --  100  --   --  102* 101*   PLT  --  107*  --   --  168 250    < > = values in this interval not displayed.     Most Recent 3 BMP's:  Recent Labs   Lab Test 02/22/21 02/16/21  0713 02/15/21  0644 02/14/21  0643     138  --  140   POTASSIUM 3.8 4.1  --  4.5   CHLORIDE 107 109  --  110*   CO2 27 25  --  25   BUN 15 27  --  24   CR 0.57 0.77  --  0.98   ANIONGAP 8 4  --  5   TOBY 8.2* 7.6*  --  8.1*   GLC 83 85 115* 109*       ASSESSMENT/PLAN:  Traumatic fracture of right hip with routine healing  S/P ORIF (open reduction internal fixation) fracture  Acute/ongoing: WBAT, PT and OT  continue tylenol 1000mg TID and qhs prn, continue tramadol 50mg q 6 hours prn, ASA 325mg QD  F/u with ortho as directed     Anemia due to blood loss, acute  Hematoma of right thigh, subsequent encounter  Acute/ongoing:, stable, continue  Iron supplement QAM     Essential hypertension  Ongoing: vitals daily and prn, BMP follow, continue to follow off Rx     Senile dementia without behavioral disturbance (H)  Ongoing: will return to LTC at discharge     Urinary retention  Acute: PVR stable, DC PVR, continue flomax 0.4mg           Orders written by provider at facility  No new orders      Electronically signed by:  Tonya Lynn Haase, APRN CNP

## 2021-02-24 NOTE — LETTER
2/24/2021        RE: Jean M Topitzhofer  [address]  [facility] [address]        [place] [facility]  [place] Medical Record Number:  [id number]  Place of Service where encounter took place:  [facility] (U) [25]  Chief Complaint   Patient presents with     Nursing Home Acute       HPI:    Jean M Topitzhofer  is a 86 year old (7/4/1934), who is being seen today for an episodic care visit.  HPI information obtained from: facility chart records, facility staff, patient report and [facility] chart review. Today's concern is:  Right hip Fx: ON exam today patient sitting up in WC, no c/o pain at time of exam, in therapy patient is walking up to 20 feet using a RW, CGA,    Anemia: Hgb 10.9 on 2/22/21  HTN: BP as follows: 138/80, 136/73, 140/81  with HR 70-80's denies CP, palpitations, SOB  Dementia: BIMS 6/15, SBT 21/28, ROS difficult due to cognitive impairement    Past Medical and Surgical History reviewed in Epic today.    MEDICATIONS:    Current Outpatient Medications   Medication Sig Dispense Refill     acetaminophen (TYLENOL) 325 MG tablet Take 1,000 mg by mouth 3 times daily And 1000mg qhs prn       aspirin (ASA) 325 MG EC tablet Take 1 tablet (325 mg) by mouth daily 30 tablet 0     calcium carbonate (ANTACID EXTRA STRENGTH) 750 MG CHEW Take 1,500 mg by mouth daily as needed       dorzolamide-timolol (COSOPT) 2-0.5 % ophthalmic solution Place 1 drop into the right eye every morning        Emollient (CERAVE) CREA Externally apply topically daily To boy legs       Eyelid Cleansers (AVENOVA EX) Externally apply 1 spray topically daily Spray to closed eyes covering eye lids       ferrous fumarate-vitamin C ER (SHANTE-SEQUELS) 65-25 MG CR tablet Take 1 tablet by mouth daily (with breakfast)       guaiFENesin (ROBITUSSIN) 100 MG/5ML SYRP Take 10 mLs by mouth every 4 hours as needed for cough       hypromellose (ARTIFICIAL TEARS) 0.5 % SOLN ophthalmic solution Place 2 drops into  both eyes 3 times daily       latanoprost (XALATAN) 0.005 % ophthalmic solution Place 1 drop into both eyes At Bedtime   4     Multiple Vitamins-Minerals (CENTRUM) TABS Take 1 tablet by mouth daily 30 tablet 0     polyethylene glycol (MIRALAX) 17 g packet Take 17 g by mouth daily as needed for constipation       tamsulosin (FLOMAX) 0.4 MG capsule Take 1 capsule (0.4 mg) by mouth daily       traMADol (ULTRAM) 50 MG tablet Take 1 tablet (50 mg) by mouth every 6 hours as needed for moderate to severe pain 40 tablet 0     Vitamin D3 (CHOLECALCIFEROL) 25 mcg (1000 units) tablet Take 2 tablets (50 mcg) by mouth daily 60 tablet 0         REVIEW OF SYSTEMS:  Unobtainable secondary to cognitive impairment.     Objective:  /80   Pulse 73   Temp 99.1  F (37.3  C)   Resp 16   LMP  (LMP Unknown)   SpO2 96%   Exam:  GENERAL APPEARANCE:  Alert, in no distress  ENT:  Mouth and posterior oropharynx normal, moist mucous membranes, [place]  EYES:  EOM, conjunctivae, lids, pupils and irises normal, PERRL  RESP:  respiratory effort and palpation of chest normal, lungs clear to auscultation , no respiratory distress  CV:  Palpation and auscultation of heart done , regular rate and rhythm, no murmur, rub, or gallop, no edema  ABDOMEN:  normal bowel sounds, soft, nontender, no hepatosplenomegaly or other masses  M/S:   patient sitting up in WC, able to move all 4 extremities  SKIN:  Inspection of skin and subcutaneous tissue baseline, did not visualize right hip incision, swelling noted to right thigh area when compared to left thigh  NEURO:   speech WNL  PSYCH:  affect and mood normal       Labs:     Most Recent 3 CBC's:  Recent Labs   Lab Test 02/22/21 02/16/21  0713 02/15/21  1750 02/14/21  0643 02/14/21  0643 02/13/21  1419   WBC  --  11.1*  --   --  13.2* 9.9   HGB 10.9* 8.4* 9.8*   < > 9.2* 14.6   MCV  --  100  --   --  102* 101*   PLT  --  107*  --   --  168 250    < > = values in this interval not displayed.     Most Recent  3 BMP's:  Recent Labs   Lab Test 02/22/21 02/16/21  0713 02/15/21  0644 02/14/21  0643    138  --  140   POTASSIUM 3.8 4.1  --  4.5   CHLORIDE 107 109  --  110*   CO2 27 25  --  25   BUN 15 27  --  24   CR 0.57 0.77  --  0.98   ANIONGAP 8 4  --  5   TOBY 8.2* 7.6*  --  8.1*   GLC 83 85 115* 109*       ASSESSMENT/PLAN:  Traumatic fracture of right hip with routine healing  S/P ORIF (open reduction internal fixation) fracture  Acute/ongoing: WBAT, PT and OT  continue tylenol 1000mg TID and qhs prn, continue tramadol 50mg q 6 hours prn, ASA 325mg QD  F/u with ortho as directed     Anemia due to blood loss, acute  Hematoma of right thigh, subsequent encounter  Acute/ongoing:, stable, continue  Iron supplement QAM     Essential hypertension  Ongoing: vitals daily and prn, BMP follow, continue to follow off Rx     Senile dementia without behavioral disturbance (H)  Ongoing: will return to LTC at discharge     Urinary retention  Acute: PVR stable, DC PVR, continue flomax 0.4mg           Orders written by provider at facility  No new orders      Electronically signed by:  Tonya Lynn Haase, APRN CNP               Sincerely,        Tonya Lynn Haase, APRN CNP

## 2021-03-02 ENCOUNTER — TRANSFERRED RECORDS (OUTPATIENT)
Dept: HEALTH INFORMATION MANAGEMENT | Facility: CLINIC | Age: 86
End: 2021-03-02

## 2021-03-03 ASSESSMENT — MIFFLIN-ST. JEOR
SCORE: 915.34
SCORE: 915.34

## 2021-03-04 ENCOUNTER — NURSING HOME VISIT (OUTPATIENT)
Dept: GERIATRICS | Facility: CLINIC | Age: 86
End: 2021-03-04
Payer: MEDICARE

## 2021-03-04 ENCOUNTER — TRANSFERRED RECORDS (OUTPATIENT)
Dept: HEALTH INFORMATION MANAGEMENT | Facility: CLINIC | Age: 86
End: 2021-03-04

## 2021-03-04 VITALS
TEMPERATURE: 97.8 F | DIASTOLIC BLOOD PRESSURE: 66 MMHG | HEIGHT: 63 IN | OXYGEN SATURATION: 96 % | HEART RATE: 78 BPM | BODY MASS INDEX: 19.77 KG/M2 | WEIGHT: 111.6 LBS | RESPIRATION RATE: 17 BRPM | SYSTOLIC BLOOD PRESSURE: 142 MMHG

## 2021-03-04 DIAGNOSIS — S70.11XD HEMATOMA OF RIGHT THIGH, SUBSEQUENT ENCOUNTER: ICD-10-CM

## 2021-03-04 DIAGNOSIS — R33.9 URINARY RETENTION: ICD-10-CM

## 2021-03-04 DIAGNOSIS — F03.90 SENILE DEMENTIA WITHOUT BEHAVIORAL DISTURBANCE (H): ICD-10-CM

## 2021-03-04 DIAGNOSIS — S72.001D TRAUMATIC FRACTURE OF RIGHT HIP WITH ROUTINE HEALING: Primary | ICD-10-CM

## 2021-03-04 DIAGNOSIS — D62 ANEMIA DUE TO BLOOD LOSS, ACUTE: ICD-10-CM

## 2021-03-04 DIAGNOSIS — Z98.890 S/P ORIF (OPEN REDUCTION INTERNAL FIXATION) FRACTURE: ICD-10-CM

## 2021-03-04 DIAGNOSIS — Z87.81 S/P ORIF (OPEN REDUCTION INTERNAL FIXATION) FRACTURE: ICD-10-CM

## 2021-03-04 DIAGNOSIS — I10 ESSENTIAL HYPERTENSION: ICD-10-CM

## 2021-03-04 PROCEDURE — 99309 SBSQ NF CARE MODERATE MDM 30: CPT | Performed by: NURSE PRACTITIONER

## 2021-03-04 NOTE — LETTER
3/4/2021        RE: Jean M Topitzhofer  [address]  [facility] [address]        [place] [facility]  [place] Medical Record Number:  [id number]  Place of Service where encounter took place:  [facility] (U) [25]  Chief Complaint   Patient presents with     Nursing Home Acute       HPI:    Jean M Topitzhofer  is a 86 year old (7/4/1934), who is being seen today for an episodic care visit.  HPI information obtained from: facility chart records, facility staff, patient report and [facility] chart review. Today's concern is:  Right hip Fx: ON exam today patient sitting up in WC, no c/o pain at time of exam, in therapy patient is standing CGA, not wanting to walk, needing total assist with cares.    Anemia: Hgb 10.9 on 2/22/21  HTN: BP as follows: 136/72, 142/66, 129/78  with HR 70's denies CP, palpitations, SOB  Dementia: BIMS 6/15, SBT 21/28, ROS difficult due to cognitive impairement       Past Medical and Surgical History reviewed in Epic today.    MEDICATIONS:    Current Outpatient Medications   Medication Sig Dispense Refill     acetaminophen (TYLENOL) 325 MG tablet Take 1,000 mg by mouth 3 times daily And 1000mg qhs prn       aspirin (ASA) 325 MG EC tablet Take 1 tablet (325 mg) by mouth daily 30 tablet 0     calcium carbonate (ANTACID EXTRA STRENGTH) 750 MG CHEW Take 1,500 mg by mouth daily as needed       dorzolamide-timolol (COSOPT) 2-0.5 % ophthalmic solution Place 1 drop into the right eye every morning        Emollient (CERAVE) CREA Externally apply topically daily To boy legs       Eyelid Cleansers (AVENOVA EX) Externally apply 1 spray topically daily Spray to closed eyes covering eye lids       ferrous fumarate-vitamin C ER (SHANTE-SEQUELS) 65-25 MG CR tablet Take 1 tablet by mouth daily (with breakfast)       guaiFENesin (ROBITUSSIN) 100 MG/5ML SYRP Take 10 mLs by mouth every 4 hours as needed for cough       hypromellose (ARTIFICIAL TEARS) 0.5 % SOLN ophthalmic  "solution Place 2 drops into both eyes 3 times daily       latanoprost (XALATAN) 0.005 % ophthalmic solution Place 1 drop into both eyes At Bedtime   4     Multiple Vitamins-Minerals (CENTRUM) TABS Take 1 tablet by mouth daily 30 tablet 0     polyethylene glycol (MIRALAX) 17 g packet Take 17 g by mouth daily as needed for constipation       tamsulosin (FLOMAX) 0.4 MG capsule Take 1 capsule (0.4 mg) by mouth daily       traMADol (ULTRAM) 50 MG tablet Take 1 tablet (50 mg) by mouth every 6 hours as needed for moderate to severe pain 40 tablet 0     Vitamin D3 (CHOLECALCIFEROL) 25 mcg (1000 units) tablet Take 2 tablets (50 mcg) by mouth daily 60 tablet 0         REVIEW OF SYSTEMS:  Unobtainable secondary to cognitive impairment.     Objective:  BP (!) 142/66   Pulse 78   Temp 97.8  F (36.6  C)   Resp 17   Ht 1.6 m (5' 3\")   Wt 50.6 kg (111 lb 9.6 oz)   LMP  (LMP Unknown)   SpO2 96%   BMI 19.77 kg/m    Exam:  GENERAL APPEARANCE:  Alert, in no distress  ENT:  Mouth and posterior oropharynx normal, moist mucous membranes, [place]  EYES:  EOM, conjunctivae, lids, pupils and irises normal, PERRL  RESP:  respiratory effort and palpation of chest normal, lungs clear to auscultation , no respiratory distress  CV:  Palpation and auscultation of heart done , regular rate and rhythm, no murmur, rub, or gallop, no edema  ABDOMEN:  normal bowel sounds, soft, nontender, no hepatosplenomegaly or other masses  M/S:   patient sitting up in WC, able to move all 4 extremities  SKIN:  Inspection of skin and subcutaneous tissue baseline, did not visualize right hip incision, swelling noted to right thigh area when compared to left thigh  NEURO:   speech WNL  PSYCH:  affect and mood normal    Labs:     Most Recent 3 CBC's:  Recent Labs   Lab Test 02/22/21 02/16/21  0713 02/15/21  1750 02/14/21  0643 02/14/21  0643 02/13/21  1419   WBC  --  11.1*  --   --  13.2* 9.9   HGB 10.9* 8.4* 9.8*   < > 9.2* 14.6   MCV  --  100  --   --  102* 101* "   PLT  --  107*  --   --  168 250    < > = values in this interval not displayed.     Most Recent 3 BMP's:  Recent Labs   Lab Test 02/22/21 02/16/21  0713 02/15/21  0644 02/14/21  0643    138  --  140   POTASSIUM 3.8 4.1  --  4.5   CHLORIDE 107 109  --  110*   CO2 27 25  --  25   BUN 15 27  --  24   CR 0.57 0.77  --  0.98   ANIONGAP 8 4  --  5   TOBY 8.2* 7.6*  --  8.1*   GLC 83 85 115* 109*       ASSESSMENT/PLAN:  Traumatic fracture of right hip with routine healing  S/P ORIF (open reduction internal fixation) fracture  Acute/ongoing: WBAT, PT and OT  continue tylenol 1000mg TID and qhs prn, continue tramadol 50mg q 6 hours prn, ASA 325mg QD  F/u with ortho as directed     Anemia due to blood loss, acute  Hematoma of right thigh, subsequent encounter  Acute/ongoing:, stable, continue  Iron supplement QAM  Hgb and BMP on monday     Essential hypertension  Ongoing: vitals daily and prn, BMP follow, continue to follow off Rx     Senile dementia without behavioral disturbance (H)  Ongoing: will return to LTC at discharge     Urinary retention  Acute:  continue flomax 0.4mg        Orders written by provider at facility  BMP and Hgb on Monday      Electronically signed by:  Tonya Lynn Haase, APRN CNP               Sincerely,        Tonya Lynn Haase, APRN CNP

## 2021-03-04 NOTE — PROGRESS NOTES
[place] [facility]  [place] Medical Record Number:  [id number]  Place of Service where encounter took place:  [facility] (U) [25]  Chief Complaint   Patient presents with     Nursing Home Acute       HPI:    Jean M Topitzhofer  is a 86 year old (7/4/1934), who is being seen today for an episodic care visit.  HPI information obtained from: facility chart records, facility staff, patient report and [facility] chart review. Today's concern is:  Right hip Fx: ON exam today patient sitting up in WC, no c/o pain at time of exam, in therapy patient is standing CGA, not wanting to walk, needing total assist with cares.    Anemia: Hgb 10.9 on 2/22/21  HTN: BP as follows: 136/72, 142/66, 129/78  with HR 70's denies CP, palpitations, SOB  Dementia: BIMS 6/15, SBT 21/28, ROS difficult due to cognitive impairement       Past Medical and Surgical History reviewed in Epic today.    MEDICATIONS:    Current Outpatient Medications   Medication Sig Dispense Refill     acetaminophen (TYLENOL) 325 MG tablet Take 1,000 mg by mouth 3 times daily And 1000mg qhs prn       aspirin (ASA) 325 MG EC tablet Take 1 tablet (325 mg) by mouth daily 30 tablet 0     calcium carbonate (ANTACID EXTRA STRENGTH) 750 MG CHEW Take 1,500 mg by mouth daily as needed       dorzolamide-timolol (COSOPT) 2-0.5 % ophthalmic solution Place 1 drop into the right eye every morning        Emollient (CERAVE) CREA Externally apply topically daily To boy legs       Eyelid Cleansers (AVENOVA EX) Externally apply 1 spray topically daily Spray to closed eyes covering eye lids       ferrous fumarate-vitamin C ER (SHANTE-SEQUELS) 65-25 MG CR tablet Take 1 tablet by mouth daily (with breakfast)       guaiFENesin (ROBITUSSIN) 100 MG/5ML SYRP Take 10 mLs by mouth every 4 hours as needed for cough       hypromellose (ARTIFICIAL TEARS) 0.5 % SOLN ophthalmic solution Place 2 drops into both eyes 3 times daily       latanoprost (XALATAN) 0.005 % ophthalmic  "solution Place 1 drop into both eyes At Bedtime   4     Multiple Vitamins-Minerals (CENTRUM) TABS Take 1 tablet by mouth daily 30 tablet 0     polyethylene glycol (MIRALAX) 17 g packet Take 17 g by mouth daily as needed for constipation       tamsulosin (FLOMAX) 0.4 MG capsule Take 1 capsule (0.4 mg) by mouth daily       traMADol (ULTRAM) 50 MG tablet Take 1 tablet (50 mg) by mouth every 6 hours as needed for moderate to severe pain 40 tablet 0     Vitamin D3 (CHOLECALCIFEROL) 25 mcg (1000 units) tablet Take 2 tablets (50 mcg) by mouth daily 60 tablet 0         REVIEW OF SYSTEMS:  Unobtainable secondary to cognitive impairment.     Objective:  BP (!) 142/66   Pulse 78   Temp 97.8  F (36.6  C)   Resp 17   Ht 1.6 m (5' 3\")   Wt 50.6 kg (111 lb 9.6 oz)   LMP  (LMP Unknown)   SpO2 96%   BMI 19.77 kg/m    Exam:  GENERAL APPEARANCE:  Alert, in no distress  ENT:  Mouth and posterior oropharynx normal, moist mucous membranes, [place]  EYES:  EOM, conjunctivae, lids, pupils and irises normal, PERRL  RESP:  respiratory effort and palpation of chest normal, lungs clear to auscultation , no respiratory distress  CV:  Palpation and auscultation of heart done , regular rate and rhythm, no murmur, rub, or gallop, no edema  ABDOMEN:  normal bowel sounds, soft, nontender, no hepatosplenomegaly or other masses  M/S:   patient sitting up in WC, able to move all 4 extremities  SKIN:  Inspection of skin and subcutaneous tissue baseline, did not visualize right hip incision, swelling noted to right thigh area when compared to left thigh  NEURO:   speech WNL  PSYCH:  affect and mood normal    Labs:     Most Recent 3 CBC's:  Recent Labs   Lab Test 02/22/21 02/16/21  0713 02/15/21  1750 02/14/21  0643 02/14/21  0643 02/13/21  1419   WBC  --  11.1*  --   --  13.2* 9.9   HGB 10.9* 8.4* 9.8*   < > 9.2* 14.6   MCV  --  100  --   --  102* 101*   PLT  --  107*  --   --  168 250    < > = values in this interval not displayed.     Most Recent 3 " BMP's:  Recent Labs   Lab Test 02/22/21 02/16/21  0713 02/15/21  0644 02/14/21  0643    138  --  140   POTASSIUM 3.8 4.1  --  4.5   CHLORIDE 107 109  --  110*   CO2 27 25  --  25   BUN 15 27  --  24   CR 0.57 0.77  --  0.98   ANIONGAP 8 4  --  5   TOBY 8.2* 7.6*  --  8.1*   GLC 83 85 115* 109*       ASSESSMENT/PLAN:  Traumatic fracture of right hip with routine healing  S/P ORIF (open reduction internal fixation) fracture  Acute/ongoing: WBAT, PT and OT  continue tylenol 1000mg TID and qhs prn, continue tramadol 50mg q 6 hours prn, ASA 325mg QD  F/u with ortho as directed     Anemia due to blood loss, acute  Hematoma of right thigh, subsequent encounter  Acute/ongoing:, stable, continue  Iron supplement QAM  Hgb and BMP on monday     Essential hypertension  Ongoing: vitals daily and prn, BMP follow, continue to follow off Rx     Senile dementia without behavioral disturbance (H)  Ongoing: will return to LTC at discharge     Urinary retention  Acute:  continue flomax 0.4mg        Orders written by provider at facility  BMP and Hgb on Monday      Electronically signed by:  Tonya Lynn Haase, APRN CNP

## 2021-03-05 ENCOUNTER — TRANSFERRED RECORDS (OUTPATIENT)
Dept: HEALTH INFORMATION MANAGEMENT | Facility: CLINIC | Age: 86
End: 2021-03-05

## 2021-03-05 LAB
ANION GAP SERPL CALCULATED.3IONS-SCNC: 6 MMOL/L (ref 7–16)
BUN SERPL-MCNC: 13 MG/DL (ref 7–26)
CALCIUM SERPL-MCNC: 8.5 MG/DL (ref 8.4–10.4)
CHLORIDE SERPLBLD-SCNC: 108 MMOL/L (ref 98–109)
CO2 SERPL-SCNC: 27 MMOL/L (ref 20–29)
CREAT SERPL-MCNC: 0.66 MG/DL (ref 0.55–1.02)
GFR SERPL CREATININE-BSD FRML MDRD: >60 ML/MIN/1.73M2
GLUCOSE SERPL-MCNC: 78 MG/DL (ref 70–100)
HEMOGLOBIN: 11.4 G/DL (ref 12–15.5)
POTASSIUM SERPL-SCNC: 4.2 MMOL/L (ref 3.5–5.1)
SODIUM SERPL-SCNC: 141 MMOL/L (ref 136–145)

## 2021-03-07 ENCOUNTER — TELEPHONE (OUTPATIENT)
Dept: GERIATRICS | Facility: CLINIC | Age: 86
End: 2021-03-07

## 2021-03-07 NOTE — TELEPHONE ENCOUNTER
UA/UC collected, started on cephalexin, gram neg psuesomonas  -continue cephalexin  -pending sensitivities

## 2021-03-08 ENCOUNTER — NURSING HOME VISIT (OUTPATIENT)
Dept: GERIATRICS | Facility: CLINIC | Age: 86
End: 2021-03-08
Payer: MEDICARE

## 2021-03-08 VITALS
WEIGHT: 111.6 LBS | HEART RATE: 79 BPM | RESPIRATION RATE: 18 BRPM | OXYGEN SATURATION: 94 % | SYSTOLIC BLOOD PRESSURE: 144 MMHG | BODY MASS INDEX: 19.77 KG/M2 | TEMPERATURE: 98 F | DIASTOLIC BLOOD PRESSURE: 80 MMHG | HEIGHT: 63 IN

## 2021-03-08 DIAGNOSIS — S72.001D TRAUMATIC FRACTURE OF RIGHT HIP WITH ROUTINE HEALING: Primary | ICD-10-CM

## 2021-03-08 DIAGNOSIS — D62 ANEMIA DUE TO BLOOD LOSS, ACUTE: ICD-10-CM

## 2021-03-08 DIAGNOSIS — I10 ESSENTIAL HYPERTENSION: ICD-10-CM

## 2021-03-08 DIAGNOSIS — Z87.81 S/P ORIF (OPEN REDUCTION INTERNAL FIXATION) FRACTURE: ICD-10-CM

## 2021-03-08 DIAGNOSIS — F03.90 SENILE DEMENTIA WITHOUT BEHAVIORAL DISTURBANCE (H): ICD-10-CM

## 2021-03-08 DIAGNOSIS — N39.0 URINARY TRACT INFECTION WITHOUT HEMATURIA, SITE UNSPECIFIED: ICD-10-CM

## 2021-03-08 DIAGNOSIS — Z98.890 S/P ORIF (OPEN REDUCTION INTERNAL FIXATION) FRACTURE: ICD-10-CM

## 2021-03-08 PROCEDURE — 99310 SBSQ NF CARE HIGH MDM 45: CPT | Performed by: NURSE PRACTITIONER

## 2021-03-08 RX ORDER — LEVOFLOXACIN 500 MG/1
500 TABLET, FILM COATED ORAL DAILY
Qty: 7 TABLET | Refills: 0
Start: 2021-03-08 | End: 2021-03-15

## 2021-03-08 NOTE — LETTER
3/8/2021        RE: Jean M Topitzhofer  [address]  [facility] [address]        [place] [facility]  [place] Medical Record Number:  [id number]  Place of Service where encounter took place:  [facility] (U) [25]  Chief Complaint   Patient presents with     Nursing Home Acute       HPI:    Jean M Topitzhofer  is a 86 year old (7/4/1934), who is being seen today for an episodic care visit.  HPI information obtained from: facility chart records, facility staff, patient report and [facility] chart review. Today's concern is:    Right hip Fx: ON exam today patient sitting up in WC, no c/o pain at time of exam, In therapy patient walking up to 130 feet using a RW with CGA  UTI: > 100,000 pseudomonas , started on keflex over the weekend, changed to levaquin today, patient is afebrile, no c/o pain in abdomen or urinary retention, dysuria.    Anemia: Hgb 10.9 on 2/22/21  HTN: BP as follows: 144/80, 118/63, 94/64,   with HR 70's denies CP, palpitations, SOB  Dementia: BIMS 6/15, SBT 21/28, ROS difficult due to cognitive impairement       Past Medical and Surgical History reviewed in Epic today.    MEDICATIONS:    Current Outpatient Medications   Medication Sig Dispense Refill     acetaminophen (TYLENOL) 325 MG tablet Take 1,000 mg by mouth 3 times daily And 1000mg qhs prn       aspirin (ASA) 325 MG EC tablet Take 1 tablet (325 mg) by mouth daily 30 tablet 0     calcium carbonate (ANTACID EXTRA STRENGTH) 750 MG CHEW Take 1,500 mg by mouth daily as needed       dorzolamide-timolol (COSOPT) 2-0.5 % ophthalmic solution Place 1 drop into the right eye every morning        Emollient (CERAVE) CREA Externally apply topically daily To boy legs       Eyelid Cleansers (AVENOVA EX) Externally apply 1 spray topically daily Spray to closed eyes covering eye lids       ferrous fumarate-vitamin C ER (SHANTE-SEQUELS) 65-25 MG CR tablet Take 1 tablet by mouth daily (with breakfast)       guaiFENesin  "(ROBITUSSIN) 100 MG/5ML SYRP Take 10 mLs by mouth every 4 hours as needed for cough       hypromellose (ARTIFICIAL TEARS) 0.5 % SOLN ophthalmic solution Place 2 drops into both eyes 3 times daily       latanoprost (XALATAN) 0.005 % ophthalmic solution Place 1 drop into both eyes At Bedtime   4     Multiple Vitamins-Minerals (CENTRUM) TABS Take 1 tablet by mouth daily 30 tablet 0     polyethylene glycol (MIRALAX) 17 g packet Take 17 g by mouth daily as needed for constipation       tamsulosin (FLOMAX) 0.4 MG capsule Take 1 capsule (0.4 mg) by mouth daily       traMADol (ULTRAM) 50 MG tablet Take 1 tablet (50 mg) by mouth every 6 hours as needed for moderate to severe pain 40 tablet 0     Vitamin D3 (CHOLECALCIFEROL) 25 mcg (1000 units) tablet Take 2 tablets (50 mcg) by mouth daily 60 tablet 0         REVIEW OF SYSTEMS:  10 point ROS of systems including Constitutional, Eyes, Respiratory, Cardiovascular, Gastroenterology, Genitourinary, Integumentary, Musculoskeletal, Psychiatric were all negative except for pertinent positives noted in my HPI.    Objective:  BP (!) 144/80   Pulse 79   Temp 98  F (36.7  C)   Resp 18   Ht 1.6 m (5' 3\")   Wt 50.6 kg (111 lb 9.6 oz)   LMP  (LMP Unknown)   SpO2 94%   BMI 19.77 kg/m    Exam:  GENERAL APPEARANCE:  Alert, in no distress  ENT:  Mouth and posterior oropharynx normal, moist mucous membranes, [place]  EYES:  EOM, conjunctivae, lids, pupils and irises normal, PERRL  RESP:  respiratory effort and palpation of chest normal, lungs clear to auscultation , no respiratory distress  CV:  Palpation and auscultation of heart done , regular rate and rhythm, no murmur, rub, or gallop, no edema  ABDOMEN:  normal bowel sounds, soft, nontender, no hepatosplenomegaly or other masses  M/S:   patient sitting up in WC, able to move all 4 extremities  SKIN:  Inspection of skin and subcutaneous tissue baseline, did not visualize right hip incision, swelling noted to right thigh area when " compared to left thigh  NEURO:   speech WNL  PSYCH:  affect and mood normal     Labs:     Most Recent 3 CBC's:  Recent Labs   Lab Test 03/05/21 02/22/21 02/16/21  0713 02/14/21  0643 02/14/21  0643 02/13/21  1419   WBC  --   --  11.1*  --  13.2* 9.9   HGB 11.4* 10.9* 8.4*   < > 9.2* 14.6   MCV  --   --  100  --  102* 101*   PLT  --   --  107*  --  168 250    < > = values in this interval not displayed.     Most Recent 3 BMP's:  Recent Labs   Lab Test 03/05/21 02/22/21 02/16/21  0713    142 138   POTASSIUM 4.2 3.8 4.1   CHLORIDE 108 107 109   CO2 27 27 25   BUN 13 15 27   CR 0.66 0.57 0.77   ANIONGAP 6* 8 4   TOBY 8.5 8.2* 7.6*   GLC 78 83 85       ASSESSMENT/PLAN:  Traumatic fracture of right hip with routine healing  S/P ORIF (open reduction internal fixation) fracture  Acute/ongoing: WBAT, PT and OT  continue tylenol 1000mg TID and qhs prn, continue tramadol 50mg q 6 hours prn, ASA 325mg QD  F/u with ortho as directed     Anemia due to blood loss, acute  Hematoma of right thigh, subsequent encounter  Acute/ongoing:, stable, continue  Iron supplement QAM  Hgb and BMP on monday     Essential hypertension  Ongoing: vitals daily and prn, BMP follow, continue to follow off Rx     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 Greater than 50% of total time spent with counseling and coordinating care due to discussed starting an Abx for UTI, push fluids, continue to work with therapy to get stronger, education on Abx and side effects. .  Electronically signed by:  Tonya Lynn Haase, APRN CNP                Sincerely,        Tonya Lynn Haase, APRN CNP

## 2021-03-08 NOTE — PROGRESS NOTES
[place] [facility]  [place] Medical Record Number:  [id number]  Place of Service where encounter took place:  [facility] (U) [25]  Chief Complaint   Patient presents with     Nursing Home Acute       HPI:    Jean M Topitzhofer  is a 86 year old (7/4/1934), who is being seen today for an episodic care visit.  HPI information obtained from: facility chart records, facility staff, patient report and [facility] chart review. Today's concern is:    Right hip Fx: ON exam today patient sitting up in WC, no c/o pain at time of exam, In therapy patient walking up to 130 feet using a RW with CGA  UTI: > 100,000 pseudomonas , started on keflex over the weekend, changed to levaquin today, patient is afebrile, no c/o pain in abdomen or urinary retention, dysuria.    Anemia: Hgb 10.9 on 2/22/21  HTN: BP as follows: 144/80, 118/63, 94/64,   with HR 70's denies CP, palpitations, SOB  Dementia: BIMS 6/15, SBT 21/28, ROS difficult due to cognitive impairement       Past Medical and Surgical History reviewed in Epic today.    MEDICATIONS:    Current Outpatient Medications   Medication Sig Dispense Refill     acetaminophen (TYLENOL) 325 MG tablet Take 1,000 mg by mouth 3 times daily And 1000mg qhs prn       aspirin (ASA) 325 MG EC tablet Take 1 tablet (325 mg) by mouth daily 30 tablet 0     calcium carbonate (ANTACID EXTRA STRENGTH) 750 MG CHEW Take 1,500 mg by mouth daily as needed       dorzolamide-timolol (COSOPT) 2-0.5 % ophthalmic solution Place 1 drop into the right eye every morning        Emollient (CERAVE) CREA Externally apply topically daily To boy legs       Eyelid Cleansers (AVENOVA EX) Externally apply 1 spray topically daily Spray to closed eyes covering eye lids       ferrous fumarate-vitamin C ER (SHANTE-SEQUELS) 65-25 MG CR tablet Take 1 tablet by mouth daily (with breakfast)       guaiFENesin (ROBITUSSIN) 100 MG/5ML SYRP Take 10 mLs by mouth every 4 hours as needed for cough        "hypromellose (ARTIFICIAL TEARS) 0.5 % SOLN ophthalmic solution Place 2 drops into both eyes 3 times daily       latanoprost (XALATAN) 0.005 % ophthalmic solution Place 1 drop into both eyes At Bedtime   4     Multiple Vitamins-Minerals (CENTRUM) TABS Take 1 tablet by mouth daily 30 tablet 0     polyethylene glycol (MIRALAX) 17 g packet Take 17 g by mouth daily as needed for constipation       tamsulosin (FLOMAX) 0.4 MG capsule Take 1 capsule (0.4 mg) by mouth daily       traMADol (ULTRAM) 50 MG tablet Take 1 tablet (50 mg) by mouth every 6 hours as needed for moderate to severe pain 40 tablet 0     Vitamin D3 (CHOLECALCIFEROL) 25 mcg (1000 units) tablet Take 2 tablets (50 mcg) by mouth daily 60 tablet 0         REVIEW OF SYSTEMS:  10 point ROS of systems including Constitutional, Eyes, Respiratory, Cardiovascular, Gastroenterology, Genitourinary, Integumentary, Musculoskeletal, Psychiatric were all negative except for pertinent positives noted in my HPI.    Objective:  BP (!) 144/80   Pulse 79   Temp 98  F (36.7  C)   Resp 18   Ht 1.6 m (5' 3\")   Wt 50.6 kg (111 lb 9.6 oz)   LMP  (LMP Unknown)   SpO2 94%   BMI 19.77 kg/m    Exam:  GENERAL APPEARANCE:  Alert, in no distress  ENT:  Mouth and posterior oropharynx normal, moist mucous membranes, [place]  EYES:  EOM, conjunctivae, lids, pupils and irises normal, PERRL  RESP:  respiratory effort and palpation of chest normal, lungs clear to auscultation , no respiratory distress  CV:  Palpation and auscultation of heart done , regular rate and rhythm, no murmur, rub, or gallop, no edema  ABDOMEN:  normal bowel sounds, soft, nontender, no hepatosplenomegaly or other masses  M/S:   patient sitting up in WC, able to move all 4 extremities  SKIN:  Inspection of skin and subcutaneous tissue baseline, did not visualize right hip incision, swelling noted to right thigh area when compared to left thigh  NEURO:   speech WNL  PSYCH:  affect and mood normal     Labs:     Most " Recent 3 CBC's:  Recent Labs   Lab Test 03/05/21 02/22/21 02/16/21  0713 02/14/21  0643 02/14/21  0643 02/13/21  1419   WBC  --   --  11.1*  --  13.2* 9.9   HGB 11.4* 10.9* 8.4*   < > 9.2* 14.6   MCV  --   --  100  --  102* 101*   PLT  --   --  107*  --  168 250    < > = values in this interval not displayed.     Most Recent 3 BMP's:  Recent Labs   Lab Test 03/05/21 02/22/21 02/16/21  0713    142 138   POTASSIUM 4.2 3.8 4.1   CHLORIDE 108 107 109   CO2 27 27 25   BUN 13 15 27   CR 0.66 0.57 0.77   ANIONGAP 6* 8 4   TOBY 8.5 8.2* 7.6*   GLC 78 83 85       ASSESSMENT/PLAN:  Traumatic fracture of right hip with routine healing  S/P ORIF (open reduction internal fixation) fracture  Acute/ongoing: WBAT, PT and OT  continue tylenol 1000mg TID and qhs prn, continue tramadol 50mg q 6 hours prn, ASA 325mg QD  F/u with ortho as directed     Anemia due to blood loss, acute  Hematoma of right thigh, subsequent encounter  Acute/ongoing:, stable, continue  Iron supplement QAM  Hgb and BMP on monday     Essential hypertension  Ongoing: vitals daily and prn, BMP follow, continue to follow off Rx     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 Greater than 50% of total time spent with counseling and coordinating care due to discussed starting an Abx for UTI, push fluids, continue to work with therapy to get stronger, education on Abx and side effects. .  Electronically signed by:  Tonya Lynn Haase, APRN CNP

## 2021-03-14 ASSESSMENT — MIFFLIN-ST. JEOR
SCORE: 1001.53
SCORE: 879.06

## 2021-03-15 ENCOUNTER — NURSING HOME VISIT (OUTPATIENT)
Dept: GERIATRICS | Facility: CLINIC | Age: 86
End: 2021-03-15
Payer: MEDICARE

## 2021-03-15 VITALS
RESPIRATION RATE: 17 BRPM | SYSTOLIC BLOOD PRESSURE: 138 MMHG | HEART RATE: 78 BPM | TEMPERATURE: 97.6 F | WEIGHT: 103.6 LBS | DIASTOLIC BLOOD PRESSURE: 74 MMHG | OXYGEN SATURATION: 95 % | BODY MASS INDEX: 18.36 KG/M2 | HEIGHT: 63 IN

## 2021-03-15 DIAGNOSIS — Z87.81 S/P ORIF (OPEN REDUCTION INTERNAL FIXATION) FRACTURE: ICD-10-CM

## 2021-03-15 DIAGNOSIS — Z98.890 S/P ORIF (OPEN REDUCTION INTERNAL FIXATION) FRACTURE: ICD-10-CM

## 2021-03-15 DIAGNOSIS — D62 ANEMIA DUE TO BLOOD LOSS, ACUTE: ICD-10-CM

## 2021-03-15 DIAGNOSIS — I10 ESSENTIAL HYPERTENSION: ICD-10-CM

## 2021-03-15 DIAGNOSIS — S72.001D TRAUMATIC FRACTURE OF RIGHT HIP WITH ROUTINE HEALING: Primary | ICD-10-CM

## 2021-03-15 DIAGNOSIS — N39.0 URINARY TRACT INFECTION WITHOUT HEMATURIA, SITE UNSPECIFIED: ICD-10-CM

## 2021-03-15 DIAGNOSIS — S70.11XD HEMATOMA OF RIGHT THIGH, SUBSEQUENT ENCOUNTER: ICD-10-CM

## 2021-03-15 DIAGNOSIS — F03.90 SENILE DEMENTIA WITHOUT BEHAVIORAL DISTURBANCE (H): ICD-10-CM

## 2021-03-15 PROCEDURE — 99310 SBSQ NF CARE HIGH MDM 45: CPT | Performed by: NURSE PRACTITIONER

## 2021-03-15 NOTE — PROGRESS NOTES
[facility]  [place] Medical Record Number:  [id number]  Place of Service where encounter took place:  [facility] (TCU) [25]  Chief Complaint   Patient presents with     Nursing Home Acute       HPI:    Jean M Topitzhofer  is a 86 year old (7/4/1934), who is being seen today for an episodic care visit.  HPI information obtained from: facility chart records, facility staff, patient report and [facility] chart review. Today's concern is:  Right hip Fx: ON exam today patient sitting up in WC, no c/o pain at time of exam, In therapy patient walking up to 130 feet using a 4ww with CGA and w/c follow  UTI: > 100,000 pseudomonas Tx with levaquin, improvement in participation in therapy  Anemia: Hgb 10.9 on 2/22/21  HTN: BP as follows: 138/74, 150/89, 110/74,   with HR 70's denies CP, palpitations, SOB  Dementia: BIMS 6/15, SBT 21/28, ROS difficult due to cognitive impairement    Past Medical and Surgical History reviewed in Epic today.    MEDICATIONS:    Current Outpatient Medications   Medication Sig Dispense Refill     acetaminophen (TYLENOL) 325 MG tablet Take 1,000 mg by mouth 3 times daily And 1000mg qhs prn       aspirin (ASA) 325 MG EC tablet Take 1 tablet (325 mg) by mouth daily 30 tablet 0     calcium carbonate (ANTACID EXTRA STRENGTH) 750 MG CHEW Take 1,500 mg by mouth daily as needed       dorzolamide-timolol (COSOPT) 2-0.5 % ophthalmic solution Place 1 drop into the right eye every morning        Emollient (CERAVE) CREA Externally apply topically daily To boy legs       Eyelid Cleansers (AVENOVA EX) Externally apply 1 spray topically daily Spray to closed eyes covering eye lids       ferrous fumarate-vitamin C ER (SHANTE-SEQUELS) 65-25 MG CR tablet Take 1 tablet by mouth daily (with breakfast)       guaiFENesin (ROBITUSSIN) 100 MG/5ML SYRP Take 10 mLs by mouth every 4 hours as needed for cough       hypromellose (ARTIFICIAL TEARS) 0.5 % SOLN ophthalmic solution Place 2 drops into  "both eyes 3 times daily       latanoprost (XALATAN) 0.005 % ophthalmic solution Place 1 drop into both eyes At Bedtime   4     levofloxacin (LEVAQUIN) 500 MG tablet Take 1 tablet (500 mg) by mouth daily for 7 days 7 tablet 0     Multiple Vitamins-Minerals (CENTRUM) TABS Take 1 tablet by mouth daily 30 tablet 0     polyethylene glycol (MIRALAX) 17 g packet Take 17 g by mouth daily as needed for constipation       tamsulosin (FLOMAX) 0.4 MG capsule Take 1 capsule (0.4 mg) by mouth daily       traMADol (ULTRAM) 50 MG tablet Take 1 tablet (50 mg) by mouth every 6 hours as needed for moderate to severe pain 40 tablet 0     Vitamin D3 (CHOLECALCIFEROL) 25 mcg (1000 units) tablet Take 2 tablets (50 mcg) by mouth daily 60 tablet 0         REVIEW OF SYSTEMS:  10 point ROS of systems including Constitutional, Eyes, Respiratory, Cardiovascular, Gastroenterology, Genitourinary, Integumentary, Musculoskeletal, Psychiatric were all negative except for pertinent positives noted in my HPI.    Objective:  /74   Pulse 78   Temp 97.6  F (36.4  C)   Resp 17   Ht 1.6 m (5' 3\")   Wt 47 kg (103 lb 9.6 oz)   LMP  (LMP Unknown)   SpO2 95%   BMI 18.35 kg/m    Exam:  GENERAL APPEARANCE:  Alert, in no distress  ENT:  Mouth and posterior oropharynx normal, moist mucous membranes, [place]  EYES:  EOM, conjunctivae, lids, pupils and irises normal, PERRL  RESP:  respiratory effort and palpation of chest normal, lungs clear to auscultation , no respiratory distress  CV:  Palpation and auscultation of heart done , regular rate and rhythm, no murmur, rub, or gallop, no edema  ABDOMEN:  normal bowel sounds, soft, nontender, no hepatosplenomegaly or other masses  M/S:   patient sitting up in WC, able to move all 4 extremities  SKIN:  Inspection of skin and subcutaneous tissue baseline, did not visualize right hip incision, swelling noted to right thigh area when compared to left thigh  NEURO:   speech WNL  PSYCH:  affect and mood " normal    Labs:     Most Recent 3 CBC's:  Recent Labs   Lab Test 03/05/21 02/22/21 02/16/21  0713 02/14/21  0643 02/14/21  0643 02/13/21  1419   WBC  --   --  11.1*  --  13.2* 9.9   HGB 11.4* 10.9* 8.4*   < > 9.2* 14.6   MCV  --   --  100  --  102* 101*   PLT  --   --  107*  --  168 250    < > = values in this interval not displayed.     Most Recent 3 BMP's:  Recent Labs   Lab Test 03/05/21 02/22/21 02/16/21  0713    142 138   POTASSIUM 4.2 3.8 4.1   CHLORIDE 108 107 109   CO2 27 27 25   BUN 13 15 27   CR 0.66 0.57 0.77   ANIONGAP 6* 8 4   TOBY 8.5 8.2* 7.6*   GLC 78 83 85       ASSESSMENT/PLAN:  Traumatic fracture of right hip with routine healing  S/P ORIF (open reduction internal fixation) fracture  Acute/ongoing: WBAT, PT and OT  continue tylenol 1000mg TID and qhs prn, continue tramadol 50mg q 6 hours prn, ASA 325mg QD  F/u with ortho as directed     Anemia due to blood loss, acute  Hematoma of right thigh, subsequent encounter  Acute/ongoing:, stable, continue  Iron supplement QAM  Hgb and BMP follow     Essential hypertension  Ongoing: vitals daily and prn, BMP follow, continue to follow off Rx     Senile dementia without behavioral disturbance (H)  Ongoing: will return to LTC at discharge     Urinary retention  UTI  Acute:  continue flomax 0.4mg , start levaquin 500mg QD for 7 days       Orders written by provider at facility  nno    Wheelchair Documentation  Size: standard 16 x 16 hemiheight due to short stature, with antirollbacks to promote safety and independence with propelling w/c on inclines  Corresponding cushion: Yes: comfort curve or equivalent  Standard foot rests: Yes  Elevating leg rests: No  Arm rests: Yes: full length  Lap tray: No  Dose the patient use oxygen? No   Is the patient able to propel wheelchair? Yes If no why not? n/a And is there someone who can?yes FDC  1. The patient has mobility limitations that impairs their ability to participate in one or more mobility related  activities: Toileting, Feeding and Grooming.  The wheelchair is suitable and necessary for use in the patient's home.  2. The patient's mobility limitations cannot be safely resolved by using a cane/walker:Yes    Reason why a cane or walker will not meet the patient's needs. (ie: balance, tolerance, level of assistance) needing CTG assistance for ambulation, w/c for independent mobility  3. The patients home has adequate access to use a manual wheelchair:Yes  4. The use of a manual wheelchair on a regular basis will improve the patients ability to participate in mobility related ADL's at home:Yes  5. The patient is willing to use a manual wheelchair at home:Yes  6. The patient has adequate upper body strength and the mental capability to safely use a manual wheelchair and/or has a caregiver that is able to assist: Yes  7. Does the patient have a lower extremity injury or edema?No  Reason for Type of Wheelchair  Patient weight: 0 lbs 0 oz        Total time spent with patient visit at the skilled nursing facility was 35 min including patient visit and review of past records. Greater than 50% of total time spent with counseling and coordinating care due to patient will be dc to snf, will need a W/C ordered, recommend memory care or LTC at discharge due to cognitive impairement, will DC 3/18/21, documentation for w/c ordered. .  Electronically signed by:  Tonya Lynn Haase, APRN CNP

## 2021-03-15 NOTE — LETTER
3/15/2021        RE: Jean M Topitzhofer  [address]  [facility] [address]        [facility]  [place] Medical Record Number:  [id number]  Place of Service where encounter took place:  [facility] (TCU) [25]  Chief Complaint   Patient presents with     Nursing Home Acute       HPI:    Jean M Topitzhofer  is a 86 year old (7/4/1934), who is being seen today for an episodic care visit.  HPI information obtained from: facility chart records, facility staff, patient report and [facility] chart review. Today's concern is:  Right hip Fx: ON exam today patient sitting up in WC, no c/o pain at time of exam, In therapy patient walking up to 130 feet using a 4ww with CGA and w/c follow  UTI: > 100,000 pseudomonas Tx with levaquin, improvement in participation in therapy  Anemia: Hgb 10.9 on 2/22/21  HTN: BP as follows: 138/74, 150/89, 110/74,   with HR 70's denies CP, palpitations, SOB  Dementia: BIMS 6/15, SBT 21/28, ROS difficult due to cognitive impairement    Past Medical and Surgical History reviewed in Epic today.    MEDICATIONS:    Current Outpatient Medications   Medication Sig Dispense Refill     acetaminophen (TYLENOL) 325 MG tablet Take 1,000 mg by mouth 3 times daily And 1000mg qhs prn       aspirin (ASA) 325 MG EC tablet Take 1 tablet (325 mg) by mouth daily 30 tablet 0     calcium carbonate (ANTACID EXTRA STRENGTH) 750 MG CHEW Take 1,500 mg by mouth daily as needed       dorzolamide-timolol (COSOPT) 2-0.5 % ophthalmic solution Place 1 drop into the right eye every morning        Emollient (CERAVE) CREA Externally apply topically daily To boy legs       Eyelid Cleansers (AVENOVA EX) Externally apply 1 spray topically daily Spray to closed eyes covering eye lids       ferrous fumarate-vitamin C ER (SHANTE-SEQUELS) 65-25 MG CR tablet Take 1 tablet by mouth daily (with breakfast)       guaiFENesin (ROBITUSSIN) 100 MG/5ML SYRP Take 10 mLs by mouth every 4 hours as  "needed for cough       hypromellose (ARTIFICIAL TEARS) 0.5 % SOLN ophthalmic solution Place 2 drops into both eyes 3 times daily       latanoprost (XALATAN) 0.005 % ophthalmic solution Place 1 drop into both eyes At Bedtime   4     levofloxacin (LEVAQUIN) 500 MG tablet Take 1 tablet (500 mg) by mouth daily for 7 days 7 tablet 0     Multiple Vitamins-Minerals (CENTRUM) TABS Take 1 tablet by mouth daily 30 tablet 0     polyethylene glycol (MIRALAX) 17 g packet Take 17 g by mouth daily as needed for constipation       tamsulosin (FLOMAX) 0.4 MG capsule Take 1 capsule (0.4 mg) by mouth daily       traMADol (ULTRAM) 50 MG tablet Take 1 tablet (50 mg) by mouth every 6 hours as needed for moderate to severe pain 40 tablet 0     Vitamin D3 (CHOLECALCIFEROL) 25 mcg (1000 units) tablet Take 2 tablets (50 mcg) by mouth daily 60 tablet 0         REVIEW OF SYSTEMS:  10 point ROS of systems including Constitutional, Eyes, Respiratory, Cardiovascular, Gastroenterology, Genitourinary, Integumentary, Musculoskeletal, Psychiatric were all negative except for pertinent positives noted in my HPI.    Objective:  /74   Pulse 78   Temp 97.6  F (36.4  C)   Resp 17   Ht 1.6 m (5' 3\")   Wt 47 kg (103 lb 9.6 oz)   LMP  (LMP Unknown)   SpO2 95%   BMI 18.35 kg/m    Exam:  GENERAL APPEARANCE:  Alert, in no distress  ENT:  Mouth and posterior oropharynx normal, moist mucous membranes, [place]  EYES:  EOM, conjunctivae, lids, pupils and irises normal, PERRL  RESP:  respiratory effort and palpation of chest normal, lungs clear to auscultation , no respiratory distress  CV:  Palpation and auscultation of heart done , regular rate and rhythm, no murmur, rub, or gallop, no edema  ABDOMEN:  normal bowel sounds, soft, nontender, no hepatosplenomegaly or other masses  M/S:   patient sitting up in WC, able to move all 4 extremities  SKIN:  Inspection of skin and subcutaneous tissue baseline, did not visualize right hip incision, swelling noted " to right thigh area when compared to left thigh  NEURO:   speech WNL  PSYCH:  affect and mood normal    Labs:     Most Recent 3 CBC's:  Recent Labs   Lab Test 03/05/21 02/22/21 02/16/21  0713 02/14/21  0643 02/14/21  0643 02/13/21  1419   WBC  --   --  11.1*  --  13.2* 9.9   HGB 11.4* 10.9* 8.4*   < > 9.2* 14.6   MCV  --   --  100  --  102* 101*   PLT  --   --  107*  --  168 250    < > = values in this interval not displayed.     Most Recent 3 BMP's:  Recent Labs   Lab Test 03/05/21 02/22/21 02/16/21  0713    142 138   POTASSIUM 4.2 3.8 4.1   CHLORIDE 108 107 109   CO2 27 27 25   BUN 13 15 27   CR 0.66 0.57 0.77   ANIONGAP 6* 8 4   TOBY 8.5 8.2* 7.6*   GLC 78 83 85       ASSESSMENT/PLAN:  Traumatic fracture of right hip with routine healing  S/P ORIF (open reduction internal fixation) fracture  Acute/ongoing: WBAT, PT and OT  continue tylenol 1000mg TID and qhs prn, continue tramadol 50mg q 6 hours prn, ASA 325mg QD  F/u with ortho as directed     Anemia due to blood loss, acute  Hematoma of right thigh, subsequent encounter  Acute/ongoing:, stable, continue  Iron supplement QAM  Hgb and BMP follow     Essential hypertension  Ongoing: vitals daily and prn, BMP follow, continue to follow off Rx     Senile dementia without behavioral disturbance (H)  Ongoing: will return to LTC at discharge     Urinary retention  UTI  Acute:  continue flomax 0.4mg , start levaquin 500mg QD for 7 days       Orders written by provider at facility  nno    Wheelchair Documentation  Size: standard 16 x 16 hemiheight due to short stature, with antirollbacks to promote safety and independence with propelling w/c on inclines  Corresponding cushion: Yes: comfort curve or equivalent  Standard foot rests: Yes  Elevating leg rests: No  Arm rests: Yes: full length  Lap tray: No  Dose the patient use oxygen? No   Is the patient able to propel wheelchair? Yes If no why not? n/a And is there someone who can?yes ashli  1. The patient has mobility  limitations that impairs their ability to participate in one or more mobility related activities: Toileting, Feeding and Grooming.  The wheelchair is suitable and necessary for use in the patient's home.  2. The patient's mobility limitations cannot be safely resolved by using a cane/walker:Yes    Reason why a cane or walker will not meet the patient's needs. (ie: balance, tolerance, level of assistance) needing CTG assistance for ambulation, w/c for independent mobility  3. The patients home has adequate access to use a manual wheelchair:Yes  4. The use of a manual wheelchair on a regular basis will improve the patients ability to participate in mobility related ADL's at home:Yes  5. The patient is willing to use a manual wheelchair at home:Yes  6. The patient has adequate upper body strength and the mental capability to safely use a manual wheelchair and/or has a caregiver that is able to assist: Yes  7. Does the patient have a lower extremity injury or edema?No  Reason for Type of Wheelchair  Patient weight: 0 lbs 0 oz        Total time spent with patient visit at the skilled nursing facility was 35 min including patient visit and review of past records. Greater than 50% of total time spent with counseling and coordinating care due to patient will be dc to [facility], will need a W/C ordered, recommend memory care or LTC at discharge due to cognitive impairement, will DC 3/18/21, documentation for w/c ordered. .  Electronically signed by:  Tonya Lynn Haase, APRN CNP               Sincerely,        Tonya Lynn Haase, APRN CNP

## 2021-03-16 NOTE — PROGRESS NOTES
[facility] DISCHARGE SUMMARY  PATIENT'S NAME: Jean M Topitzhofer  YOB: 1934  MEDICAL RECORD NUMBER:  [id number]  Place of Service where encounter took place:  [facility] (U) [25]    PRIMARY CARE PROVIDER AND CLINIC RESPONSIBLE AFTER TRANSFER:   [facility], [address] [place] [id number]    [facility] [place] [facility]     Transferring providers: Tonya Lynn Haase, APRN CNP, Dr. Isidro Francois MD  Recent Hospitalization/ED:  [facility] stay 2/13/21 to 2/16/21.  Date of SNF Admission: February/16/2021  Date of SNF (anticipated) Discharge: March/18/2021  Discharged to: previous independent home  Cognitive Scores: BIMS: 6/15 and SBT 21/28.  Physical Function: Ambulating 1 x 50' 1 x 75' 1 x 125' ft with RW CGA with mild gt deviation   DME: Walker    CODE STATUS/ADVANCE DIRECTIVES DISCUSSION:  No CPR- Do NOT Intubate   ALLERGIES: Patient has no known allergies.    DISCHARGE DIAGNOSIS/NURSING FACILITY COURSE:   Patient progressed slowly to walking up to 125 feet using a RW  With CGA, needing assist with toileting and ADL's, a W/C was ordered for patient use at home to promote independent mobility, patient able to self propel w/c. Patient will DC to [facility] with home PT, OT, RN and HHA through [facility].     Past Medical History:  has a past medical history of Benign essential hypertension and Dementia (H).    Discharge Medications:    Current Outpatient Medications   Medication Sig Dispense Refill     acetaminophen (TYLENOL) 325 MG tablet Take 1,000 mg by mouth 3 times daily And 1000mg qhs prn       ascorbic acid (VITAMIN C) 250 MG CHEW chewable tablet Take 250 mg by mouth every morning       aspirin (ASA) 325 MG EC tablet Take 1 tablet (325 mg) by mouth daily 30 tablet 0     calcium carbonate (ANTACID EXTRA STRENGTH) 750 MG CHEW Take 1,500 mg by mouth daily as needed       dorzolamide-timolol (COSOPT) 2-0.5 % ophthalmic  "solution Place 1 drop into the right eye every morning        Emollient (CERAVE) CREA Externally apply topically daily To boy legs       Eyelid Cleansers (AVENOVA EX) Externally apply 1 spray topically daily Spray to closed eyes covering eye lids       ferrous fumarate-vitamin C ER (SHANTE-SEQUELS) 65-25 MG CR tablet Take 1 tablet by mouth daily (with breakfast)       guaiFENesin (ROBITUSSIN) 100 MG/5ML SYRP Take 10 mLs by mouth every 4 hours as needed for cough       hypromellose (ARTIFICIAL TEARS) 0.5 % SOLN ophthalmic solution Place 2 drops into both eyes 3 times daily       latanoprost (XALATAN) 0.005 % ophthalmic solution Place 1 drop into both eyes At Bedtime   4     Multiple Vitamins-Minerals (CENTRUM) TABS Take 1 tablet by mouth daily 30 tablet 0     polyethylene glycol (MIRALAX) 17 g packet Take 17 g by mouth daily as needed for constipation       tamsulosin (FLOMAX) 0.4 MG capsule Take 1 capsule (0.4 mg) by mouth daily       traMADol (ULTRAM) 50 MG tablet Take 1 tablet (50 mg) by mouth every 6 hours as needed for moderate to severe pain 40 tablet 0     Vitamin D3 (CHOLECALCIFEROL) 25 mcg (1000 units) tablet Take 2 tablets (50 mcg) by mouth daily 60 tablet 0       Medication Changes/Rationale:     DC tramadol    Started on ferrous sulfate 325mg QD due to anemia, Hgb 10.9 --> 11.4 on 3/5/21    Started on levaquin 500mg for 7 days for UTI, Abx course complete    Controlled medications sent with patient:   not applicable/none     ROS:   10 point ROS of systems including Constitutional, Eyes, Respiratory, Cardiovascular, Gastroenterology, Genitourinary, Integumentary, Musculoskeletal, Psychiatric were all negative except for pertinent positives noted in my HPI.    Physical Exam:   Vitals: /78   Pulse 75   Temp 96.4  F (35.8  C)   Resp 17   Ht 1.6 m (5' 3\")   Wt 59.2 kg (130 lb 9.6 oz)   LMP  (LMP Unknown)   SpO2 95%   BMI 23.13 kg/m    BMI= Body mass index is 23.13 kg/m .  GENERAL APPEARANCE:  " Alert, in no distress  ENT:  Mouth and posterior oropharynx normal, moist mucous membranes, [place]  EYES:  EOM, conjunctivae, lids, pupils and irises normal, PERRL  RESP:  no respiratory distress  CV:  no edema  ABDOMEN:  abdomen round  M/S:   patient sitting up in W/C able to move all 4 extremities  SKIN:  Inspection of skin and subcutaneous tissue baseline  NEURO:   speech WNL  PSYCH:  affect and mood normal     SNF labs:   Most Recent 3 CBC's:  Recent Labs   Lab Test 03/05/21 02/22/21 02/16/21  0713 02/14/21  0643 02/14/21  0643 02/13/21  1419   WBC  --   --  11.1*  --  13.2* 9.9   HGB 11.4* 10.9* 8.4*   < > 9.2* 14.6   MCV  --   --  100  --  102* 101*   PLT  --   --  107*  --  168 250    < > = values in this interval not displayed.     Most Recent 3 BMP's:  Recent Labs   Lab Test 03/05/21 02/22/21 02/16/21  0713    142 138   POTASSIUM 4.2 3.8 4.1   CHLORIDE 108 107 109   CO2 27 27 25   BUN 13 15 27   CR 0.66 0.57 0.77   ANIONGAP 6* 8 4   TOBY 8.5 8.2* 7.6*   GLC 78 83 85     ASSESSMENT/PLAN:  Traumatic fracture of right hip with routine healing  S/P ORIF (open reduction internal fixation) fracture  Acute/ongoing: WBAT, patient will DC to [facility] with home PT, OT, RN and HHA through [facility]  continue tylenol 1000mg TID and qhs prn tramadol DC patient not taking,   Continue ASA 325mg QD  F/u with ortho as directed     Anemia due to blood loss, acute  Hematoma of right thigh, subsequent encounter  Acute/ongoing:, Iron supplement QAM  Hgb stable     Essential hypertension  Ongoing: continue to follow off Rx     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      Discharge Services: Home Care:  Occupational Therapy, Physical Therapy and Registered Nurse    Discharge Instructions Verbalized to Patient at Discharge:     None      TOTAL DISCHARGE TIME:   Greater than 30 minutes  Electronically signed by:  Tonya Lynn Haase, APRN CNP

## 2021-03-17 ENCOUNTER — DISCHARGE SUMMARY NURSING HOME (OUTPATIENT)
Dept: GERIATRICS | Facility: CLINIC | Age: 86
End: 2021-03-17
Payer: MEDICARE

## 2021-03-17 VITALS
WEIGHT: 130.6 LBS | DIASTOLIC BLOOD PRESSURE: 78 MMHG | BODY MASS INDEX: 23.14 KG/M2 | HEART RATE: 75 BPM | RESPIRATION RATE: 17 BRPM | HEIGHT: 63 IN | SYSTOLIC BLOOD PRESSURE: 130 MMHG | TEMPERATURE: 96.4 F | OXYGEN SATURATION: 95 %

## 2021-03-17 DIAGNOSIS — Z98.890 S/P ORIF (OPEN REDUCTION INTERNAL FIXATION) FRACTURE: ICD-10-CM

## 2021-03-17 DIAGNOSIS — F03.90 SENILE DEMENTIA WITHOUT BEHAVIORAL DISTURBANCE (H): ICD-10-CM

## 2021-03-17 DIAGNOSIS — S72.001D TRAUMATIC FRACTURE OF RIGHT HIP WITH ROUTINE HEALING: Primary | ICD-10-CM

## 2021-03-17 DIAGNOSIS — D62 ANEMIA DUE TO BLOOD LOSS, ACUTE: ICD-10-CM

## 2021-03-17 DIAGNOSIS — I10 ESSENTIAL HYPERTENSION: ICD-10-CM

## 2021-03-17 DIAGNOSIS — S70.11XD HEMATOMA OF RIGHT THIGH, SUBSEQUENT ENCOUNTER: ICD-10-CM

## 2021-03-17 DIAGNOSIS — N39.0 URINARY TRACT INFECTION WITHOUT HEMATURIA, SITE UNSPECIFIED: ICD-10-CM

## 2021-03-17 DIAGNOSIS — Z87.81 S/P ORIF (OPEN REDUCTION INTERNAL FIXATION) FRACTURE: ICD-10-CM

## 2021-03-17 PROCEDURE — 99316 NF DSCHRG MGMT 30 MIN+: CPT | Performed by: NURSE PRACTITIONER

## 2021-03-17 RX ORDER — ASCORBIC ACID 250 MG
250 TABLET,CHEWABLE ORAL EVERY MORNING
COMMUNITY

## 2021-03-17 NOTE — LETTER
3/17/2021        RE: Jean M Topitzhofer  [address]  [facility] [address]        [facility] DISCHARGE SUMMARY  PATIENT'S NAME: Jean M Topitzhofer  YOB: 1934  MEDICAL RECORD NUMBER:  [id number]  Place of Service where encounter took place:  [facility] ([facility]) [[id number]]    PRIMARY CARE PROVIDER AND CLINIC RESPONSIBLE AFTER TRANSFER:   [facility] Physician [facility], [address] [place] [id number]    [facility] Provider     Transferring providers: Tonya Lynn Haase, APRN CNP, Dr. Isidro Francois MD  Recent Hospitalization/ED:  [facility] stay 2/13/21 to 2/16/21.  Date of SNF Admission: February/16/2021  Date of SNF (anticipated) Discharge: March/18/2021  Discharged to: previous independent home  Cognitive Scores: BIMS: 6/15 and SBT 21/28.  Physical Function: Ambulating 1 x 50' 1 x 75' 1 x 125' ft with RW CGA with mild gt deviation   DME: Walker    CODE STATUS/ADVANCE DIRECTIVES DISCUSSION:  No CPR- Do NOT Intubate   ALLERGIES: Patient has no known allergies.    DISCHARGE DIAGNOSIS/NURSING FACILITY COURSE:   Patient progressed slowly to walking up to 125 feet using a RW  With CGA, needing assist with toileting and ADL's, a W/C was ordered for patient use at home to promote independent mobility, patient able to self propel w/c. Patient will DC to [facility] with home PT, OT, RN and HHA through [facility].     Past Medical History:  has a past medical history of Benign essential hypertension and Dementia (H).    Discharge Medications:    Current Outpatient Medications   Medication Sig Dispense Refill     acetaminophen (TYLENOL) 325 MG tablet Take 1,000 mg by mouth 3 times daily And 1000mg qhs prn       ascorbic acid (VITAMIN C) 250 MG CHEW chewable tablet Take 250 mg by mouth every morning       aspirin (ASA) 325 MG EC tablet Take 1 tablet (325 mg) by mouth daily 30 tablet 0     calcium carbonate (ANTACID EXTRA STRENGTH) 750 MG  "CHEW Take 1,500 mg by mouth daily as needed       dorzolamide-timolol (COSOPT) 2-0.5 % ophthalmic solution Place 1 drop into the right eye every morning        Emollient (CERAVE) CREA Externally apply topically daily To boy legs       Eyelid Cleansers (AVENOVA EX) Externally apply 1 spray topically daily Spray to closed eyes covering eye lids       ferrous fumarate-vitamin C ER (SHANTE-SEQUELS) 65-25 MG CR tablet Take 1 tablet by mouth daily (with breakfast)       guaiFENesin (ROBITUSSIN) 100 MG/5ML SYRP Take 10 mLs by mouth every 4 hours as needed for cough       hypromellose (ARTIFICIAL TEARS) 0.5 % SOLN ophthalmic solution Place 2 drops into both eyes 3 times daily       latanoprost (XALATAN) 0.005 % ophthalmic solution Place 1 drop into both eyes At Bedtime   4     Multiple Vitamins-Minerals (CENTRUM) TABS Take 1 tablet by mouth daily 30 tablet 0     polyethylene glycol (MIRALAX) 17 g packet Take 17 g by mouth daily as needed for constipation       tamsulosin (FLOMAX) 0.4 MG capsule Take 1 capsule (0.4 mg) by mouth daily       traMADol (ULTRAM) 50 MG tablet Take 1 tablet (50 mg) by mouth every 6 hours as needed for moderate to severe pain 40 tablet 0     Vitamin D3 (CHOLECALCIFEROL) 25 mcg (1000 units) tablet Take 2 tablets (50 mcg) by mouth daily 60 tablet 0       Medication Changes/Rationale:     DC tramadol    Started on ferrous sulfate 325mg QD due to anemia, Hgb 10.9 --> 11.4 on 3/5/21    Started on levaquin 500mg for 7 days for UTI, Abx course complete    Controlled medications sent with patient:   not applicable/none     ROS:   10 point ROS of systems including Constitutional, Eyes, Respiratory, Cardiovascular, Gastroenterology, Genitourinary, Integumentary, Musculoskeletal, Psychiatric were all negative except for pertinent positives noted in my HPI.    Physical Exam:   Vitals: /78   Pulse 75   Temp 96.4  F (35.8  C)   Resp 17   Ht 1.6 m (5' 3\")   Wt 59.2 kg (130 lb 9.6 oz)   LMP  (LMP " Unknown)   SpO2 95%   BMI 23.13 kg/m    BMI= Body mass index is 23.13 kg/m .  GENERAL APPEARANCE:  Alert, in no distress  ENT:  Mouth and posterior oropharynx normal, moist mucous membranes, [place]  EYES:  EOM, conjunctivae, lids, pupils and irises normal, PERRL  RESP:  no respiratory distress  CV:  no edema  ABDOMEN:  abdomen round  M/S:   patient sitting up in W/C able to move all 4 extremities  SKIN:  Inspection of skin and subcutaneous tissue baseline  NEURO:   speech WNL  PSYCH:  affect and mood normal     SNF labs:   Most Recent 3 CBC's:  Recent Labs   Lab Test 03/05/21 02/22/21 02/16/21  0713 02/14/21  0643 02/14/21  0643 02/13/21  1419   WBC  --   --  11.1*  --  13.2* 9.9   HGB 11.4* 10.9* 8.4*   < > 9.2* 14.6   MCV  --   --  100  --  102* 101*   PLT  --   --  107*  --  168 250    < > = values in this interval not displayed.     Most Recent 3 BMP's:  Recent Labs   Lab Test 03/05/21 02/22/21 02/16/21  0713    142 138   POTASSIUM 4.2 3.8 4.1   CHLORIDE 108 107 109   CO2 27 27 25   BUN 13 15 27   CR 0.66 0.57 0.77   ANIONGAP 6* 8 4   TOBY 8.5 8.2* 7.6*   GLC 78 83 85     ASSESSMENT/PLAN:  Traumatic fracture of right hip with routine healing  S/P ORIF (open reduction internal fixation) fracture  Acute/ongoing: WBAT, patient will DC to [facility] with home PT, OT, RN and HHA through [facility]  continue tylenol 1000mg TID and qhs prn tramadol DC patient not taking,   Continue ASA 325mg QD  F/u with ortho as directed     Anemia due to blood loss, acute  Hematoma of right thigh, subsequent encounter  Acute/ongoing:, Iron supplement QAM  Hgb stable     Essential hypertension  Ongoing: continue to follow off Rx     Senile dementia without behavioral disturbance (H)  Ongoing: will DC to [facility] at discharge     Urinary retention  UTI  Acute:  continue flomax 0.4mg , start levaquin 500mg QD for 7 days course finished      DISCHARGE PLAN:    Follow up labs: No labs orders/due    Medical Follow Up:      Follow up  with primary care provider in 1 weeks    MTM referral needed and placed by this provider: No    Current [place] scheduled appointments:     Future Appointments   Date Time Provider Department Center   No future appts.      Discharge Services: Home Care:  Occupational Therapy, Physical Therapy and Registered Nurse    Discharge Instructions Verbalized to Patient at Discharge:     None      TOTAL DISCHARGE TIME:   Greater than 30 minutes  Electronically signed by:  Tonya Lynn Haase, APRN CNP                         Sincerely,        Tonya Lynn Haase, APRN CNP

## 2021-05-30 VITALS — WEIGHT: 102 LBS | BODY MASS INDEX: 20.09 KG/M2

## 2021-12-21 ENCOUNTER — LAB REQUISITION (OUTPATIENT)
Dept: LAB | Facility: CLINIC | Age: 86
End: 2021-12-21
Payer: MEDICARE

## 2021-12-21 DIAGNOSIS — I10 ESSENTIAL (PRIMARY) HYPERTENSION: ICD-10-CM

## 2021-12-21 DIAGNOSIS — E55.9 VITAMIN D DEFICIENCY, UNSPECIFIED: ICD-10-CM

## 2021-12-23 LAB
ANION GAP SERPL CALCULATED.3IONS-SCNC: 11 MMOL/L (ref 5–18)
BASOPHILS # BLD AUTO: 0.1 10E3/UL (ref 0–0.2)
BASOPHILS NFR BLD AUTO: 1 %
BUN SERPL-MCNC: 22 MG/DL (ref 8–28)
CALCIUM SERPL-MCNC: 9.1 MG/DL (ref 8.5–10.5)
CHLORIDE BLD-SCNC: 104 MMOL/L (ref 98–107)
CO2 SERPL-SCNC: 23 MMOL/L (ref 22–31)
CREAT SERPL-MCNC: 0.77 MG/DL (ref 0.6–1.1)
EOSINOPHIL # BLD AUTO: 0.3 10E3/UL (ref 0–0.7)
EOSINOPHIL NFR BLD AUTO: 3 %
ERYTHROCYTE [DISTWIDTH] IN BLOOD BY AUTOMATED COUNT: 13.5 % (ref 10–15)
GFR SERPL CREATININE-BSD FRML MDRD: 74 ML/MIN/1.73M2
GLUCOSE BLD-MCNC: 79 MG/DL (ref 70–125)
HCT VFR BLD AUTO: 48.4 % (ref 35–47)
HGB BLD-MCNC: 15.2 G/DL (ref 11.7–15.7)
IMM GRANULOCYTES # BLD: 0.1 10E3/UL
IMM GRANULOCYTES NFR BLD: 1 %
LYMPHOCYTES # BLD AUTO: 1.1 10E3/UL (ref 0.8–5.3)
LYMPHOCYTES NFR BLD AUTO: 10 %
MCH RBC QN AUTO: 31.7 PG (ref 26.5–33)
MCHC RBC AUTO-ENTMCNC: 31.4 G/DL (ref 31.5–36.5)
MCV RBC AUTO: 101 FL (ref 78–100)
MONOCYTES # BLD AUTO: 1.1 10E3/UL (ref 0–1.3)
MONOCYTES NFR BLD AUTO: 10 %
NEUTROPHILS # BLD AUTO: 8.3 10E3/UL (ref 1.6–8.3)
NEUTROPHILS NFR BLD AUTO: 75 %
NRBC # BLD AUTO: 0 10E3/UL
NRBC BLD AUTO-RTO: 0 /100
PLATELET # BLD AUTO: 251 10E3/UL (ref 150–450)
POTASSIUM BLD-SCNC: 4.6 MMOL/L (ref 3.5–5)
RBC # BLD AUTO: 4.79 10E6/UL (ref 3.8–5.2)
SODIUM SERPL-SCNC: 138 MMOL/L (ref 136–145)
WBC # BLD AUTO: 10.9 10E3/UL (ref 4–11)

## 2021-12-23 PROCEDURE — 82306 VITAMIN D 25 HYDROXY: CPT | Mod: ORL | Performed by: NURSE PRACTITIONER

## 2021-12-23 PROCEDURE — P9603 ONE-WAY ALLOW PRORATED MILES: HCPCS | Mod: ORL | Performed by: NURSE PRACTITIONER

## 2021-12-23 PROCEDURE — 85025 COMPLETE CBC W/AUTO DIFF WBC: CPT | Mod: ORL | Performed by: NURSE PRACTITIONER

## 2021-12-23 PROCEDURE — 36415 COLL VENOUS BLD VENIPUNCTURE: CPT | Mod: ORL | Performed by: NURSE PRACTITIONER

## 2021-12-23 PROCEDURE — 80048 BASIC METABOLIC PNL TOTAL CA: CPT | Mod: ORL | Performed by: NURSE PRACTITIONER

## 2021-12-24 LAB — DEPRECATED CALCIDIOL+CALCIFEROL SERPL-MC: 46 UG/L (ref 30–80)

## 2022-06-19 ENCOUNTER — LAB REQUISITION (OUTPATIENT)
Dept: LAB | Facility: CLINIC | Age: 87
End: 2022-06-19
Payer: MEDICARE

## 2022-06-19 DIAGNOSIS — I10 ESSENTIAL (PRIMARY) HYPERTENSION: ICD-10-CM

## 2022-06-19 DIAGNOSIS — E55.9 VITAMIN D DEFICIENCY, UNSPECIFIED: ICD-10-CM

## (undated) DEVICE — DRAPE X-RAY TUBE 00-901169-01-OEC

## (undated) DEVICE — DRILL BIT QUICK COUPLING 3 FLUTE 4.2MMX330/100MM CALIBRATE

## (undated) DEVICE — SYR 10ML FINGER CONTROL W/O NDL 309695

## (undated) DEVICE — LINEN HALF SHEET 5512

## (undated) DEVICE — SUCTION CANISTER MEDIVAC LINER 3000ML W/LID 65651-530

## (undated) DEVICE — STPL SKIN 35W 6.9MM  PXW35

## (undated) DEVICE — DRSG XEROFORM 1X8"

## (undated) DEVICE — LINEN ORTHO ACL PACK 5447

## (undated) DEVICE — LINEN FULL SHEET 5511

## (undated) DEVICE — ESU GROUND PAD ADULT W/CORD E7507

## (undated) DEVICE — NDL 22GA 1.5"

## (undated) DEVICE — BAG CLEAR TRASH 1.3M 39X33" P4040C

## (undated) DEVICE — SU VICRYL 1 CT-1 27" J341H

## (undated) DEVICE — SU MONOCRYL 3-0 PS-1 27" Y936H

## (undated) DEVICE — GLOVE PROTEXIS MICRO 8.5  2D73PM85

## (undated) DEVICE — DRSG TEGADERM 4X4 3/4" 1626W

## (undated) DEVICE — WIRE GUIDE 3.2X400MM  357.399

## (undated) DEVICE — PACK HIP NAILING SOP32HPFC4

## (undated) DEVICE — PREP CHLORAPREP 26ML TINTED HI-LITE ORANGE 930815

## (undated) DEVICE — SU VICRYL 2-0 CT-2 27" UND J269H

## (undated) RX ORDER — BUPIVACAINE HYDROCHLORIDE AND EPINEPHRINE 5; 5 MG/ML; UG/ML
INJECTION, SOLUTION EPIDURAL; INTRACAUDAL; PERINEURAL
Status: DISPENSED
Start: 2021-02-14

## (undated) RX ORDER — FENTANYL CITRATE 50 UG/ML
INJECTION, SOLUTION INTRAMUSCULAR; INTRAVENOUS
Status: DISPENSED
Start: 2021-02-14

## (undated) RX ORDER — CEFAZOLIN SODIUM 2 G/100ML
INJECTION, SOLUTION INTRAVENOUS
Status: DISPENSED
Start: 2021-02-14

## (undated) RX ORDER — ACETAMINOPHEN 325 MG/1
TABLET ORAL
Status: DISPENSED
Start: 2021-02-14

## (undated) RX ORDER — ONDANSETRON 2 MG/ML
INJECTION INTRAMUSCULAR; INTRAVENOUS
Status: DISPENSED
Start: 2021-02-14

## (undated) RX ORDER — TRANEXAMIC ACID 10 MG/ML
INJECTION, SOLUTION INTRAVENOUS
Status: DISPENSED
Start: 2021-02-14

## (undated) RX ORDER — KETOROLAC TROMETHAMINE 15 MG/ML
INJECTION, SOLUTION INTRAMUSCULAR; INTRAVENOUS
Status: DISPENSED
Start: 2021-02-14